# Patient Record
Sex: FEMALE | Race: BLACK OR AFRICAN AMERICAN | NOT HISPANIC OR LATINO | Employment: OTHER | URBAN - METROPOLITAN AREA
[De-identification: names, ages, dates, MRNs, and addresses within clinical notes are randomized per-mention and may not be internally consistent; named-entity substitution may affect disease eponyms.]

---

## 2017-01-13 ENCOUNTER — OFFICE VISIT (OUTPATIENT)
Dept: OPHTHALMOLOGY | Facility: CLINIC | Age: 62
End: 2017-01-13
Payer: COMMERCIAL

## 2017-01-13 DIAGNOSIS — H25.13 NUCLEAR SCLEROSIS, BILATERAL: ICD-10-CM

## 2017-01-13 DIAGNOSIS — H40.1231 LOW-TENSION GLAUCOMA OF BOTH EYES, MILD STAGE: Primary | ICD-10-CM

## 2017-01-13 DIAGNOSIS — H52.7 REFRACTIVE ERROR: ICD-10-CM

## 2017-01-13 PROCEDURE — 92250 FUNDUS PHOTOGRAPHY W/I&R: CPT | Mod: S$GLB,,, | Performed by: OPHTHALMOLOGY

## 2017-01-13 PROCEDURE — 92083 EXTENDED VISUAL FIELD XM: CPT | Mod: S$GLB,,, | Performed by: OPHTHALMOLOGY

## 2017-01-13 PROCEDURE — 92014 COMPRE OPH EXAM EST PT 1/>: CPT | Mod: S$GLB,,, | Performed by: OPHTHALMOLOGY

## 2017-01-13 PROCEDURE — 99999 PR PBB SHADOW E&M-EST. PATIENT-LVL II: CPT | Mod: PBBFAC,,, | Performed by: OPHTHALMOLOGY

## 2017-01-13 NOTE — MR AVS SNAPSHOT
[facility] - Ophthalmology  [address] [facility] [address] [place] [address]  Phone: [phone]  Fax: [phone]                  Sophia Hope   2017 8:30 AM   Office Visit    Description:  Female : 1955   Provider:  Jose Scott MD   Department:  [facility] - Ophthalmology           Reason for Visit     Glaucoma           Diagnoses this Visit        Comments    Low-tension glaucoma of both eyes, mild stage    -  Primary     Nuclear sclerosis, bilateral         Refractive error                To Do List           Future Appointments        Provider Department Dept Phone    2017 8:30 AM Tamela Lance MD [facility] [phone]      Goals (5 Years of Data)     None      [facility]     [facility] -  Assistance  Registered nurses in the [facility] provide clinical advisement, health education, appointment booking, and other advisory services.  Call for this free service at [phone].             Medications           Message regarding Medications     Verify the changes and/or additions to your medication regime listed below are the same as discussed with your clinician today.  If any of these changes or additions are incorrect, please notify your healthcare provider.             Verify that the below list of medications is an accurate representation of the medications you are currently taking.  If none reported, the list may be blank. If incorrect, please contact your healthcare provider. Carry this list with you in case of emergency.           Current Medications     allopurinol (ZYLOPRIM) 100 MG tablet Take 1 tablet (100 mg total) by mouth once daily.    atenolol (TENORMIN) 100 MG tablet take 1 tablet by mouth once daily    calcium carbonate-vitamin D2 600 mg calcium- 200 unit Cap Take 1 tablet by mouth Twice daily.    COMBIGAN 0.2-0.5 % Drop instill 1 drop into both eyes twice a day    dextran 70-hypromellose (ARTIFICIAL  TEARS) ophthalmic solution Place 1 drop into both eyes. BOTH EYES PRN     hydrochlorothiazide (MICROZIDE) 12.5 mg capsule Take 1 capsule (12.5 mg total) by mouth once daily.    irbesartan (AVAPRO) 300 MG tablet take 1 tablet by mouth once daily    latanoprost 0.005 % ophthalmic solution instill 1 drop into both eyes at bedtime    levothyroxine (SYNTHROID) 100 MCG tablet take 1 tablet by mouth every morning ON AN EMPTY STOMACH    meclizine (ANTIVERT) 25 mg tablet Take 1 tablet (25 mg total) by mouth every 6 (six) hours as needed for Dizziness.    meloxicam (MOBIC) 15 MG tablet Take 1 tablet (15 mg total) by mouth once daily.           Clinical Reference Information           Allergies as of 1/13/2017     Clonidine    Cardizem [Diltiazem Hcl]      Immunizations Administered on Date of Encounter - 1/13/2017     None      Orders Placed During Today's Visit      Normal Orders This Visit    Color Fundus Photography - OU - Both Eyes     Jimenes Visual Field - OU - Extended - Both Eyes

## 2017-01-13 NOTE — PROGRESS NOTES
SUBJECTIVE:   Sophia Hope is a 61 y.o. female   Corrected distance visual acuity was 20/30  in the right eye and 20/25 in the left eye.   Chief Complaint   Patient presents with    Glaucoma     combigan bid ou, xalatan qhs ou        HPI:  HPI     Glaucoma    Additional comments: combigan bid ou, xalatan qhs ou           Comments   3-4 mth hvf, dilation, and sdp's. States 98% compliance.     DR HENRY PT  1. LTG No FHx (init 17/17=21/21 adjusted for thin K's)  Goal <14  CCT /  2. Cataract OU    AT's prn OU    Combigan BID OU  Xalatan QHS OU       Last edited by Nora Loera MA on 1/13/2017  8:49 AM. (History)        Assessment /Plan :  1. Low-tension glaucoma of both eyes, mild stage Doing well, IOP within acceptable range relative to target IOP and no evidence of progression. Continue current treatment. Reviewed importance of continued compliance with treatment and follow up.     2. Nuclear sclerosis, bilateral OD>OS Patient does reports mild visual decline from incipient cataractous changes, but not sufficient to affect activities of daily living. I recommend monitoring visual status and follow up when visual symptoms worsen.     3. Refractive error      Return to clinic in 4 months  or as needed.  With IOP Check and GOCT

## 2017-02-21 RX ORDER — HYDROCHLOROTHIAZIDE 12.5 MG/1
12.5 CAPSULE ORAL DAILY
Qty: 30 CAPSULE | Refills: 0 | Status: SHIPPED | OUTPATIENT
Start: 2017-02-21 | End: 2017-03-18 | Stop reason: SDUPTHER

## 2017-03-06 ENCOUNTER — LAB VISIT (OUTPATIENT)
Dept: LAB | Facility: HOSPITAL | Age: 62
End: 2017-03-06
Attending: FAMILY MEDICINE
Payer: COMMERCIAL

## 2017-03-06 ENCOUNTER — OFFICE VISIT (OUTPATIENT)
Dept: FAMILY MEDICINE | Facility: CLINIC | Age: 62
End: 2017-03-06
Payer: COMMERCIAL

## 2017-03-06 VITALS
SYSTOLIC BLOOD PRESSURE: 134 MMHG | HEIGHT: 67 IN | HEART RATE: 85 BPM | OXYGEN SATURATION: 95 % | BODY MASS INDEX: 31.01 KG/M2 | TEMPERATURE: 97 F | WEIGHT: 197.56 LBS | RESPIRATION RATE: 18 BRPM | DIASTOLIC BLOOD PRESSURE: 70 MMHG

## 2017-03-06 DIAGNOSIS — E66.9 OBESITY (BMI 30.0-34.9): ICD-10-CM

## 2017-03-06 DIAGNOSIS — E79.0 HYPERURICEMIA: ICD-10-CM

## 2017-03-06 DIAGNOSIS — H40.1290: ICD-10-CM

## 2017-03-06 DIAGNOSIS — I10 ESSENTIAL HYPERTENSION: ICD-10-CM

## 2017-03-06 DIAGNOSIS — Z12.31 OTHER SCREENING MAMMOGRAM: ICD-10-CM

## 2017-03-06 DIAGNOSIS — Z23 NEED FOR VACCINE FOR TD (TETANUS-DIPHTHERIA): ICD-10-CM

## 2017-03-06 DIAGNOSIS — Z00.00 ANNUAL PHYSICAL EXAM: ICD-10-CM

## 2017-03-06 DIAGNOSIS — E03.4 HYPOTHYROIDISM DUE TO ACQUIRED ATROPHY OF THYROID: ICD-10-CM

## 2017-03-06 DIAGNOSIS — Z00.00 ANNUAL PHYSICAL EXAM: Primary | ICD-10-CM

## 2017-03-06 DIAGNOSIS — M85.80 OSTEOPENIA: ICD-10-CM

## 2017-03-06 DIAGNOSIS — Z78.0 POSTMENOPAUSAL: ICD-10-CM

## 2017-03-06 LAB
ALBUMIN SERPL BCP-MCNC: 3.6 G/DL
ALP SERPL-CCNC: 73 U/L
ALT SERPL W/O P-5'-P-CCNC: 15 U/L
ANION GAP SERPL CALC-SCNC: 9 MMOL/L
AST SERPL-CCNC: 22 U/L
BASOPHILS # BLD AUTO: 0.03 K/UL
BASOPHILS NFR BLD: 0.6 %
BILIRUB SERPL-MCNC: 0.9 MG/DL
BILIRUB UR QL STRIP: NEGATIVE
BUN SERPL-MCNC: 18 MG/DL
CALCIUM SERPL-MCNC: 9.6 MG/DL
CHLORIDE SERPL-SCNC: 106 MMOL/L
CHOLEST/HDLC SERPL: 5.5 {RATIO}
CLARITY UR REFRACT.AUTO: CLEAR
CO2 SERPL-SCNC: 28 MMOL/L
COLOR UR AUTO: YELLOW
CREAT SERPL-MCNC: 1.1 MG/DL
DIFFERENTIAL METHOD: ABNORMAL
EOSINOPHIL # BLD AUTO: 0.1 K/UL
EOSINOPHIL NFR BLD: 1.9 %
ERYTHROCYTE [DISTWIDTH] IN BLOOD BY AUTOMATED COUNT: 13.3 %
EST. GFR  (AFRICAN AMERICAN): >60 ML/MIN/1.73 M^2
EST. GFR  (NON AFRICAN AMERICAN): 53.6 ML/MIN/1.73 M^2
GLUCOSE SERPL-MCNC: 98 MG/DL
GLUCOSE UR QL STRIP: NEGATIVE
HCT VFR BLD AUTO: 40.8 %
HDL/CHOLESTEROL RATIO: 18.2 %
HDLC SERPL-MCNC: 198 MG/DL
HDLC SERPL-MCNC: 36 MG/DL
HGB BLD-MCNC: 13.1 G/DL
HGB UR QL STRIP: NEGATIVE
KETONES UR QL STRIP: NEGATIVE
LDLC SERPL CALC-MCNC: 136 MG/DL
LEUKOCYTE ESTERASE UR QL STRIP: NEGATIVE
LYMPHOCYTES # BLD AUTO: 2 K/UL
LYMPHOCYTES NFR BLD: 37.5 %
MCH RBC QN AUTO: 31.4 PG
MCHC RBC AUTO-ENTMCNC: 32.1 %
MCV RBC AUTO: 98 FL
MONOCYTES # BLD AUTO: 0.4 K/UL
MONOCYTES NFR BLD: 6.6 %
NEUTROPHILS # BLD AUTO: 2.8 K/UL
NEUTROPHILS NFR BLD: 53.4 %
NITRITE UR QL STRIP: NEGATIVE
NONHDLC SERPL-MCNC: 162 MG/DL
PH UR STRIP: 6 [PH] (ref 5–8)
PLATELET # BLD AUTO: 199 K/UL
PMV BLD AUTO: 11.7 FL
POTASSIUM SERPL-SCNC: 4.2 MMOL/L
PROT SERPL-MCNC: 7.5 G/DL
PROT UR QL STRIP: NEGATIVE
RBC # BLD AUTO: 4.17 M/UL
SODIUM SERPL-SCNC: 143 MMOL/L
SP GR UR STRIP: 1.01 (ref 1–1.03)
TRIGL SERPL-MCNC: 130 MG/DL
TSH SERPL DL<=0.005 MIU/L-ACNC: 0.41 UIU/ML
URATE SERPL-MCNC: 7.4 MG/DL
URN SPEC COLLECT METH UR: NORMAL
UROBILINOGEN UR STRIP-ACNC: NEGATIVE EU/DL
WBC # BLD AUTO: 5.31 K/UL

## 2017-03-06 PROCEDURE — 90471 IMMUNIZATION ADMIN: CPT | Mod: S$GLB,,, | Performed by: FAMILY MEDICINE

## 2017-03-06 PROCEDURE — 3078F DIAST BP <80 MM HG: CPT | Mod: S$GLB,,, | Performed by: FAMILY MEDICINE

## 2017-03-06 PROCEDURE — 81003 URINALYSIS AUTO W/O SCOPE: CPT

## 2017-03-06 PROCEDURE — 99396 PREV VISIT EST AGE 40-64: CPT | Mod: 25,S$GLB,, | Performed by: FAMILY MEDICINE

## 2017-03-06 PROCEDURE — 84443 ASSAY THYROID STIM HORMONE: CPT

## 2017-03-06 PROCEDURE — 86803 HEPATITIS C AB TEST: CPT

## 2017-03-06 PROCEDURE — 36415 COLL VENOUS BLD VENIPUNCTURE: CPT | Mod: PO

## 2017-03-06 PROCEDURE — 80061 LIPID PANEL: CPT

## 2017-03-06 PROCEDURE — 90714 TD VACC NO PRESV 7 YRS+ IM: CPT | Mod: S$GLB,,, | Performed by: FAMILY MEDICINE

## 2017-03-06 PROCEDURE — 84550 ASSAY OF BLOOD/URIC ACID: CPT

## 2017-03-06 PROCEDURE — 85025 COMPLETE CBC W/AUTO DIFF WBC: CPT

## 2017-03-06 PROCEDURE — 99999 PR PBB SHADOW E&M-EST. PATIENT-LVL III: CPT | Mod: PBBFAC,,, | Performed by: FAMILY MEDICINE

## 2017-03-06 PROCEDURE — 88175 CYTOPATH C/V AUTO FLUID REDO: CPT

## 2017-03-06 PROCEDURE — 80053 COMPREHEN METABOLIC PANEL: CPT

## 2017-03-06 PROCEDURE — 3075F SYST BP GE 130 - 139MM HG: CPT | Mod: S$GLB,,, | Performed by: FAMILY MEDICINE

## 2017-03-06 NOTE — PROGRESS NOTES
HISTORY OF PRESENT ILLNESS: Ms. Hope comes in today fasting and with taking medication without acute problems for annual wellness examination.    END OF LIFE DECISION: She has no living will and does not desire life support.     Current Outpatient Prescriptions   Medication Sig    atenolol (TENORMIN) 100 MG tablet take 1 tablet by mouth once daily    calcium carbonate-vitamin D2 600 mg calcium- 200 unit Cap Take 1 tablet by mouth Twice daily.    COMBIGAN 0.2-0.5 % Drop instill 1 drop into both eyes twice a day    dextran 70-hypromellose (ARTIFICIAL TEARS) ophthalmic solution Place 1 drop into both eyes. BOTH EYES PRN     hydrochlorothiazide (MICROZIDE) 12.5 mg capsule Take 1 capsule (12.5 mg total) by mouth once daily.    irbesartan (AVAPRO) 300 MG tablet take 1 tablet by mouth once daily    latanoprost 0.005 % ophthalmic solution instill 1 drop into both eyes at bedtime    levothyroxine (SYNTHROID) 100 MCG tablet take 1 tablet by mouth every morning ON AN EMPTY STOMACH    meloxicam (MOBIC) 15 MG tablet Take 1 tablet (15 mg total) by mouth once daily PRN.    allopurinol (ZYLOPRIM) 100 MG tablet Take 1 tablet (100 mg total) by mouth once daily (NOT TAKING)    meclizine (ANTIVERT) 25 mg tablet Take 1 tablet (25 mg total) by mouth every 6 (six) hours as needed for Dizziness.     SCREENINGS:   Cholesterol: February 18, 2016.  FFS/Colonoscopy: April 14, 2014 - benign colon polyp, diverticulosis; repeat in 5 years.  Mammogram: March 10, 2016 - okay.  GYN Exam: February 18, 2016 - okay. Per GYN pap smear every 3 years but patient desires pap smear today.  Dexa Scan: March 9, 2015 - osteopenia; repeat in 5 years.  Eye Exam: January 13, 2017 with Dr. CATRACHO Scott and every 4 months for glaucoma surveillance.  PPD: Never.  Immunizations: Td/Tdap - May 29, 2007. She desires.  Gardasil - N./A.  Zostavax - February 17, 2014.  Pneumovax - Never.  Seasonal Flu - October 27, 2016.    ROS:  GENERAL: Denies fever, chills,  "fatigue or unusual weight change. Appetite normal. Weight 86 kg (189 lb 9.5 oz) at August 18, 2016 visit. Monitors diet now since sister (who flooded moved out). Has not been exercising.  SKIN: Denies rashes, itching, changes in mole, color or texture of skin or easy bruising.  HEAD: Denies headaches or recent head trauma.  EYES: Denies change in vision, pain, diplopia, redness or discharge. She wears glasses.  EARS: Denies ear pain, discharge, vertigo or decreased hearing.  NOSE: Denies loss of smell, epistaxis or rhinitis.  MOUTH & THROAT: Denies hoarseness or change in voice. Denies excessive gum bleeding or mouth sores. Denies sore throat.  NODES: Denies swollen glands.  CHEST: Denies LUCERO, wheezing, cough, or sputum production.  CARDIOVASCULAR: Denies chest pain, PND, orthopnea or reduced exercise tolerance. Denies palpitations.  ABDOMEN: Denies diarrhea, constipation, nausea, vomiting, abdominal pain, or blood in stool.  URINARY: Denies flank pain, dysuria or hematuria.  GENITOURINARY: Denies flank pain, dysuria, frequency or hematuria. She occasionally performs monthly breast self exams.  ENDOCRINE: Denies diabetes or cholesterol problems.  HEME/LYMPH: Denies bleeding problems.  PERIPHERAL VASCULAR:Denies claudication or cyanosis.  MUSCULOSKELETAL: Denies joint stiffness, pain or swelling. Denies edema.  NEUROLOGIC: Denies history of seizures, tremors, paralysis, alteration of gait or coordination.  PSYCHIATRIC: Denies mood swings, depression, anxiety, homicidal or suicidal thoughts. Denies sleep problems.    PE:   VS: /70  Pulse 85  Temp 96.6 °F (35.9 °C) (Tympanic)   Resp 18  Ht 5' 6.5" (1.689 m)  Wt 89.6 kg (197 lb 8.5 oz)  SpO2 95%  BMI 31.4 kg/m2  APPEARANCE: Well nourished, well developed female, pleasant and obese, alert and oriented in no acute distress.   HEAD: Non tender. Full range of motion.  EYES: PERRL, conjunctiva pink, lids no edema.  EARS: External canal patent, no swelling or " redness. TM's shiny and clear.  NOSE: Mucosa and turbinates pink, not swollen. No discharge. Non tender sinuses.  THROAT: No pharyngeal erythema or exudate. No stridor.   NECK: Supple, no mass, thyroid not enlarged.  NODES: No cervical, axillary or inguinal lymph node enlargement.  CHEST: Normal respiratory effort. Lungs clear to auscultation.  CARDIOVASCULAR: Normal S1, S2. No rubs, murmurs or gallops. PMI not displaced. No carotid bruit. Pedal pulses palpable bilaterally. No edema.  ABDOMEN: Bowel sounds present. Not distended. Soft. No tenderness, masses or organomegaly.  BREAST EXAM: Symmetrical, no external lesions, no discharge, no masses palpated.  PELVIC EXAM: No external lesions noted, no discharge, cervix appears normal, bimanual exam showed no uterine abnormalities and no adnexal masses or tenderness. Urethra and bladder intact.  RECTAL EXAM: No external hemorrhoids or anal fissures. Heme-negative stool in the rectal vault.  MUSCULOSKELETAL: No joint deformities or stiffness. She is ambulatory without problems.  SKIN: No rashes or suspicious lesions, normal color and turgor.  NEUROLOGIC: Cranial Nerves: II-XII grossly intact. DTR's: Knees, Ankles 2+ and equal bilaterally. Gait & Posture: Normal gait and fine motion.  PSYCHIATRIC: Patient alert, oriented x 3. Mood/Affect normal without acute anxiety or depression noted. Judgment/insight good as she makes appropriate decisions during today's examination.    ASSESSMENT:    ICD-10-CM ICD-9-CM    1. Annual physical exam Z00.00 V70.0 Uric acid      CBC auto differential      TSH      Urinalysis      Comprehensive metabolic panel      Lipid panel      Hepatitis C antibody      Liquid-based pap smear, screening   2. Essential hypertension I10 401.9    3. Hypothyroidism due to acquired atrophy of thyroid E03.4 244.8      246.8    4. Osteopenia M85.80 733.90    5. Hyperuricemia E79.0 790.6    6. Low tension open-angle glaucoma(365.12)  365.12    7. Obesity (BMI  30.0-34.9) E66.9 278.00    8. Postmenopausal Z78.0 V49.81    9. Need for vaccine for TD (tetanus-diphtheria) Z23 V06.5 Td Vaccinie   10. Other screening mammogram Z12.31 V76.12 Mammo Digital Screening Bilat with CAD       PLAN:  1. Age-appropriate counseling-appropriate low-sodium, low-cholesterol diet and exercise daily, monthly breast self-exam, annual wellness examination.  2. Patient advised to correspond via My Chart for results.  3. Continue current medications.  4. Keep follow up with specialist.  5. See me in 6 months for hypertension follow up.

## 2017-03-06 NOTE — MR AVS SNAPSHOT
[facility]  [address] [facility] [address]  Phone: [phone]                  Sophia Hope   3/6/2017 9:00 AM   Office Visit    Description:  Female : 1955   Provider:  Tamela Lance MD   Department:  [facility]           Reason for Visit     Annual Exam           Diagnoses this Visit        Comments    Annual physical exam    -  Primary     Essential hypertension         Hypothyroidism due to acquired atrophy of thyroid         Osteopenia         Hyperuricemia         Low tension open-angle glaucoma(365.12)         Obesity (BMI 30.0-34.9)         Postmenopausal         Need for vaccine for TD (tetanus-diphtheria)         Other screening mammogram                To Do List           Future Appointments        Provider Department Dept Phone    3/6/2017 11:10 AM LABORATORY, [address] [facility] [phone]    3/15/2017 8:00 AM [facility] MAMMO1-S[facility] [phone]    2017 8:00 AM Jose Scott MD [facility] - Ophthalmology [phone]    2017 8:00 AM Tamela Lance MD [facility] [phone]      Goals (5 Years of Data)     None      Follow-Up and Disposition     Return in about 6 months (around 2017) for blood pressure follow up.      [facility]     [facility] Care [facility] - 24/ Assistance  Registered nurses in the [facility] provide clinical advisement, health education, appointment booking, and other advisory services.  Call for this free service at [phone].             Medications           Message regarding Medications     Verify the changes and/or additions to your medication regime listed below are the same as discussed with your clinician today.  If any of these changes or additions are incorrect, please notify your healthcare provider.             Verify that the below list of medications is an accurate  "representation of the medications you are currently taking.  If none reported, the list may be blank. If incorrect, please contact your healthcare provider. Carry this list with you in case of emergency.           Current Medications     atenolol (TENORMIN) 100 MG tablet take 1 tablet by mouth once daily    calcium carbonate-vitamin D2 600 mg calcium- 200 unit Cap Take 1 tablet by mouth Twice daily.    COMBIGAN 0.2-0.5 % Drop instill 1 drop into both eyes twice a day    dextran 70-hypromellose (ARTIFICIAL TEARS) ophthalmic solution Place 1 drop into both eyes. BOTH EYES PRN     hydrochlorothiazide (MICROZIDE) 12.5 mg capsule Take 1 capsule (12.5 mg total) by mouth once daily.    irbesartan (AVAPRO) 300 MG tablet take 1 tablet by mouth once daily    latanoprost 0.005 % ophthalmic solution instill 1 drop into both eyes at bedtime    levothyroxine (SYNTHROID) 100 MCG tablet take 1 tablet by mouth every morning ON AN EMPTY STOMACH    meloxicam (MOBIC) 15 MG tablet Take 1 tablet (15 mg total) by mouth once daily.    allopurinol (ZYLOPRIM) 100 MG tablet Take 1 tablet (100 mg total) by mouth once daily.    meclizine (ANTIVERT) 25 mg tablet Take 1 tablet (25 mg total) by mouth every 6 (six) hours as needed for Dizziness.           Clinical Reference Information           Your Vitals Were     BP Pulse Temp Resp Height Weight    134/70 85 96.6 °F (35.9 °C) (Tympanic) 18 5' 6.5" (1.689 m) 89.6 kg (197 lb 8.5 oz)    SpO2 BMI             95% 31.4 kg/m2         Blood Pressure          Most Recent Value    BP  134/70      Allergies as of 3/6/2017     Clonidine    Cardizem [Diltiazem Hcl]      Immunizations Administered on Date of Encounter - 3/6/2017     Name Date Dose VIS Date Route    TD 3/6/2017 0.5 mL 2/24/2015 Intramuscular      Orders Placed During Today's Visit      Normal Orders This Visit    Liquid-based pap smear, screening     Td Vaccinie     Urinalysis     Future Labs/Procedures Expected by Expires    CBC auto " differential  3/6/2017 5/5/2018    Comprehensive metabolic panel  3/6/2017 5/5/2018    Hepatitis C antibody  3/6/2017 5/5/2018    Lipid panel  3/6/2017 5/5/2018    Mammo Digital Screening Bilat with CAD  3/6/2017 5/6/2018    TSH  3/6/2017 5/5/2018    Uric acid  3/6/2017 5/5/2018      Instructions    Please correspond with Dr. Lance via My Chart for your results.     Thanks.       Language Assistance Services     ATTENTION: Language assistance services are available, free of charge. Please call [phone].      ATENCIÓN: Si habla español, tiene a toussaint disposición servicios gratuitos de asistencia lingüística. Llame al [phone].     CHÚ Ý: N?u b?n nói Ti?ng Vi?t, có các d?ch v? h? tr? ngôn ng? mi?n phí dành cho b?n. G?i s? [phone].         [facility] complies with applicable Federal civil rights laws and does not discriminate on the basis of race, color, national origin, age, disability, or sex.

## 2017-03-07 LAB — HCV AB SERPL QL IA: NEGATIVE

## 2017-03-10 ENCOUNTER — PATIENT MESSAGE (OUTPATIENT)
Dept: FAMILY MEDICINE | Facility: CLINIC | Age: 62
End: 2017-03-10

## 2017-03-15 ENCOUNTER — HOSPITAL ENCOUNTER (OUTPATIENT)
Dept: RADIOLOGY | Facility: HOSPITAL | Age: 62
Discharge: HOME | End: 2017-03-15
Attending: FAMILY MEDICINE
Payer: COMMERCIAL

## 2017-03-15 DIAGNOSIS — Z12.31 OTHER SCREENING MAMMOGRAM: ICD-10-CM

## 2017-03-15 PROCEDURE — 77067 SCR MAMMO BI INCL CAD: CPT | Mod: TC

## 2017-03-15 PROCEDURE — 77067 SCR MAMMO BI INCL CAD: CPT | Mod: 26,,, | Performed by: RADIOLOGY

## 2017-03-15 PROCEDURE — 77063 BREAST TOMOSYNTHESIS BI: CPT | Mod: 26,,, | Performed by: RADIOLOGY

## 2017-03-20 RX ORDER — HYDROCHLOROTHIAZIDE 12.5 MG/1
CAPSULE ORAL
Qty: 30 CAPSULE | Refills: 5 | Status: SHIPPED | OUTPATIENT
Start: 2017-03-20 | End: 2018-04-26 | Stop reason: SDUPTHER

## 2017-03-21 ENCOUNTER — PATIENT MESSAGE (OUTPATIENT)
Dept: FAMILY MEDICINE | Facility: CLINIC | Age: 62
End: 2017-03-21

## 2017-03-21 DIAGNOSIS — E79.0 HYPERURICEMIA: ICD-10-CM

## 2017-03-21 RX ORDER — ALLOPURINOL 100 MG/1
100 TABLET ORAL DAILY
Qty: 30 TABLET | Refills: 5 | Status: SHIPPED | OUTPATIENT
Start: 2017-03-21 | End: 2017-10-21 | Stop reason: SDUPTHER

## 2017-03-29 RX ORDER — HYDROCHLOROTHIAZIDE 12.5 MG/1
CAPSULE ORAL
Qty: 30 CAPSULE | Refills: 5 | Status: SHIPPED | OUTPATIENT
Start: 2017-03-29 | End: 2017-06-01 | Stop reason: SDUPTHER

## 2017-03-29 RX ORDER — BRIMONIDINE TARTRATE, TIMOLOL MALEATE 2; 5 MG/ML; MG/ML
SOLUTION/ DROPS OPHTHALMIC
Qty: 5 ML | Refills: 5 | Status: SHIPPED | OUTPATIENT
Start: 2017-03-29 | End: 2017-08-25 | Stop reason: SDUPTHER

## 2017-05-12 ENCOUNTER — OFFICE VISIT (OUTPATIENT)
Dept: OPHTHALMOLOGY | Facility: CLINIC | Age: 62
End: 2017-05-12
Payer: COMMERCIAL

## 2017-05-12 DIAGNOSIS — H25.13 NUCLEAR SCLEROSIS, BILATERAL: ICD-10-CM

## 2017-05-12 DIAGNOSIS — H40.1231 LOW-TENSION GLAUCOMA OF BOTH EYES, MILD STAGE: Primary | ICD-10-CM

## 2017-05-12 DIAGNOSIS — H26.9 CORTICAL CATARACT: ICD-10-CM

## 2017-05-12 PROCEDURE — 99999 PR PBB SHADOW E&M-EST. PATIENT-LVL I: CPT | Mod: PBBFAC,,, | Performed by: OPHTHALMOLOGY

## 2017-05-12 PROCEDURE — 92012 INTRM OPH EXAM EST PATIENT: CPT | Mod: S$GLB,,, | Performed by: OPHTHALMOLOGY

## 2017-05-12 PROCEDURE — 92133 CPTRZD OPH DX IMG PST SGM ON: CPT | Mod: S$GLB,,, | Performed by: OPHTHALMOLOGY

## 2017-05-12 NOTE — PROGRESS NOTES
SUBJECTIVE:   Sophia Hope is a 61 y.o. female   Corrected distance visual acuity was 20/30 -2 in the right eye and 20/25 in the left eye.   Chief Complaint   Patient presents with    Glaucoma     combigan bid ou, xalatan qhs ou. 4 mth iop, goct. 98% compliance        HPI:  HPI     Glaucoma    Additional comments: combigan bid ou, xalatan qhs ou. 4 mth iop, goct.   98% compliance           Comments   1. Mild LTG No FHx (init 17/17=21/21 adjusted for thin K's)  Goal <14  CCT /  2. Cataract OU    AT's prn OU    Combigan BID OU  Xalatan QHS OU       Last edited by Nora Loera MA on 5/12/2017  8:15 AM. (History)        Assessment /Plan :  1. Low-tension glaucoma of both eyes, mild stage Doing well, IOP within acceptable range relative to target IOP and no evidence of progression. Continue current treatment. Reviewed importance of continued compliance with treatment and follow up.     2. Nuclear sclerosis, bilateral will cont to follow   3. Cortical cataract as above     Return to clinic in 4 months  or as needed.  With IOP Check

## 2017-05-22 RX ORDER — LEVOTHYROXINE SODIUM 100 UG/1
100 TABLET ORAL
Qty: 30 TABLET | Refills: 5 | Status: SHIPPED | OUTPATIENT
Start: 2017-05-22 | End: 2017-12-01 | Stop reason: SDUPTHER

## 2017-06-01 ENCOUNTER — LAB VISIT (OUTPATIENT)
Dept: LAB | Facility: HOSPITAL | Age: 62
End: 2017-06-01
Attending: FAMILY MEDICINE
Payer: COMMERCIAL

## 2017-06-01 ENCOUNTER — OFFICE VISIT (OUTPATIENT)
Dept: FAMILY MEDICINE | Facility: CLINIC | Age: 62
End: 2017-06-01
Payer: COMMERCIAL

## 2017-06-01 VITALS
HEIGHT: 67 IN | HEART RATE: 67 BPM | RESPIRATION RATE: 16 BRPM | WEIGHT: 202.38 LBS | TEMPERATURE: 97 F | SYSTOLIC BLOOD PRESSURE: 132 MMHG | BODY MASS INDEX: 31.76 KG/M2 | DIASTOLIC BLOOD PRESSURE: 86 MMHG | OXYGEN SATURATION: 98 %

## 2017-06-01 DIAGNOSIS — E79.0 HYPERURICEMIA: ICD-10-CM

## 2017-06-01 DIAGNOSIS — I10 ESSENTIAL HYPERTENSION: Primary | ICD-10-CM

## 2017-06-01 DIAGNOSIS — M19.072 ARTHRITIS OF LEFT FOOT: ICD-10-CM

## 2017-06-01 LAB
ANION GAP SERPL CALC-SCNC: 9 MMOL/L
BASOPHILS # BLD AUTO: 0.02 K/UL
BASOPHILS NFR BLD: 0.4 %
BUN SERPL-MCNC: 19 MG/DL
CALCIUM SERPL-MCNC: 9.1 MG/DL
CHLORIDE SERPL-SCNC: 106 MMOL/L
CO2 SERPL-SCNC: 26 MMOL/L
CREAT SERPL-MCNC: 1 MG/DL
DIFFERENTIAL METHOD: ABNORMAL
EOSINOPHIL # BLD AUTO: 0.1 K/UL
EOSINOPHIL NFR BLD: 2.3 %
ERYTHROCYTE [DISTWIDTH] IN BLOOD BY AUTOMATED COUNT: 13.5 %
EST. GFR  (AFRICAN AMERICAN): >60 ML/MIN/1.73 M^2
EST. GFR  (NON AFRICAN AMERICAN): >60 ML/MIN/1.73 M^2
GLUCOSE SERPL-MCNC: 96 MG/DL
HCT VFR BLD AUTO: 38.7 %
HGB BLD-MCNC: 12.5 G/DL
LYMPHOCYTES # BLD AUTO: 1.6 K/UL
LYMPHOCYTES NFR BLD: 30.9 %
MCH RBC QN AUTO: 31.6 PG
MCHC RBC AUTO-ENTMCNC: 32.3 %
MCV RBC AUTO: 98 FL
MONOCYTES # BLD AUTO: 0.4 K/UL
MONOCYTES NFR BLD: 6.8 %
NEUTROPHILS # BLD AUTO: 3.1 K/UL
NEUTROPHILS NFR BLD: 59.4 %
PLATELET # BLD AUTO: 199 K/UL
PMV BLD AUTO: 11 FL
POTASSIUM SERPL-SCNC: 3.9 MMOL/L
RBC # BLD AUTO: 3.96 M/UL
SODIUM SERPL-SCNC: 141 MMOL/L
URATE SERPL-MCNC: 5.7 MG/DL
WBC # BLD AUTO: 5.17 K/UL

## 2017-06-01 PROCEDURE — 99214 OFFICE O/P EST MOD 30 MIN: CPT | Mod: S$GLB,,, | Performed by: FAMILY MEDICINE

## 2017-06-01 PROCEDURE — 85025 COMPLETE CBC W/AUTO DIFF WBC: CPT

## 2017-06-01 PROCEDURE — 99999 PR PBB SHADOW E&M-EST. PATIENT-LVL III: CPT | Mod: PBBFAC,,, | Performed by: FAMILY MEDICINE

## 2017-06-01 PROCEDURE — 80048 BASIC METABOLIC PNL TOTAL CA: CPT

## 2017-06-01 PROCEDURE — 84550 ASSAY OF BLOOD/URIC ACID: CPT

## 2017-06-01 PROCEDURE — 36415 COLL VENOUS BLD VENIPUNCTURE: CPT | Mod: PO

## 2017-06-01 NOTE — PROGRESS NOTES
Subjective:       Patient ID: Sophia Hope is a 61 y.o. female.    Chief Complaint: Hypertension (Uric acid) and Follow-up      Ms. Hope comes in today for 2-3 month hypertension and elevated uric acid follow up.  She has taken medication today.  She states she has not been exercising but monitors her diet.    She states she is back on allopurinol and hydrochlorothiazide since March 21, 2017.  She states she generally has no foot pain of that she wears shoes that cover the top of her foot.  She denies having swelling, warmth, redness of her feet; shortness of breath, cough, wheezing; chest pain, palpitations, leg swelling; abdominal pain, nausea, vomiting, diarrhea, constipation; fever, chills, fatigue, appetite change; unusual urinary symptoms; headaches; back pain.    She saw Dr. Scott, ophthalmologist, on May 12, 2017 for glaucoma surveillance with 4-month follow-up advised.      Current Outpatient Prescriptions:  allopurinol (ZYLOPRIM) 100 MG tablet, Take 1 tablet (100 mg total) by mouth once daily.  atenolol (TENORMIN) 100 MG tablet, take 1 tablet by mouth once daily  calcium carbonate-vitamin D2 600 mg calcium- 200 unit Cap, Take 1 tablet by mouth Twice daily.  COMBIGAN 0.2-0.5 % Drop, instill 1 drop into both eyes twice a day  dextran 70-hypromellose (ARTIFICIAL TEARS) ophthalmic solution, Place 1 drop into both eyes. BOTH EYES PRN   hydrochlorothiazide (MICROZIDE) 12.5 mg capsule, take 1 capsule by mouth once daily  irbesartan (AVAPRO) 300 MG tablet, take 1 tablet by mouth once daily  latanoprost 0.005 % ophthalmic solution, instill 1 drop into both eyes at bedtime  levothyroxine (SYNTHROID) 100 MCG tablet, Take 1 tablet (100 mcg total) by mouth before breakfast.  meloxicam (MOBIC) 15 MG tablet, Take 1 tablet (15 mg total) by mouth once daily.  meclizine (ANTIVERT) 25 mg tablet, Take 1 tablet (25 mg total) by mouth every 6 (six) hours as needed for Dizziness.      Labs:                  WBC                       5.31                03/06/2017                 HGB                      13.1                03/06/2017                 HCT                      40.8                03/06/2017                 PLT                      199                 03/06/2017           LDLCALC                  136.0               03/06/2017                           CHOL                     198                 03/06/2017                 TRIG                     130                 03/06/2017                 HDL                      36 (L)              03/06/2017                 ALT                      15                  03/06/2017                 AST                      22                  03/06/2017                 NA                       143                 03/06/2017                 K                        4.2                 03/06/2017                 CL                       106                 03/06/2017                 CREATININE               1.1                 03/06/2017                 BUN                      18                  03/06/2017                 CO2                      28                  03/06/2017                 TSH                      0.407               03/06/2017                 URICACID                 7.4 (H)             03/06/2017               10/27/2016 Left foot x-ray - Findings: There is mild hallux valgus and associated bunion.  Degenerative changes in the midfoot region 1st MTP joint.  Dorsal and plantar calcaneal spurs.  Soft tissue swelling dorsal aspect of the forefoot.  No acute fracture or dislocation.      Hypertension   Pertinent negatives include no chest pain, headaches, palpitations or shortness of breath.     Review of Systems   Constitutional: Negative for activity change, appetite change, chills, fatigue and fever.        Weight 89.6 kg (197 lb 8.5 oz) at March 6, 2017 visit.   Eyes:        See history of present illness examination.   Respiratory: Negative for cough, shortness  of breath and wheezing.    Cardiovascular: Negative for chest pain, palpitations and leg swelling.   Gastrointestinal: Negative for abdominal pain, constipation, diarrhea, nausea and vomiting.   Genitourinary: Negative for difficulty urinating.   Musculoskeletal: Negative for back pain.        See history of present illness.   Neurological: Negative for headaches.       Objective:      Physical Exam   Constitutional: She is oriented to person, place, and time. She appears well-developed and well-nourished. No distress.   Cardiovascular: Normal rate, regular rhythm, normal heart sounds and intact distal pulses.    No murmur heard.  Pulmonary/Chest: Effort normal and breath sounds normal. No respiratory distress. She has no wheezes.   Abdominal: Soft. Bowel sounds are normal. She exhibits no distension and no mass. There is no tenderness. There is no rebound and no guarding.   Musculoskeletal: Normal range of motion. She exhibits no edema or tenderness.   She is ambulatory without problems. Non tender feet with full range of motion noted.   Neurological: She is alert and oriented to person, place, and time.   Skin: She is not diaphoretic.   Psychiatric: She has a normal mood and affect. Her behavior is normal. Judgment and thought content normal.   Vitals reviewed.      Assessment:       1. Essential hypertension    2. Hyperuricemia    3. Arthritis of left foot        Plan:       1.  Labs:  BMP, Uric Acid, CBC.  Patient advised to correspond via My Chart for results.  2.  Continue current medications, follow low sodium, low cholesterol, low carb diet, daily walks.  3.  Keep 9/6/2017 scheduled appointment with me for hypertension follow up.

## 2017-06-08 RX ORDER — IRBESARTAN 300 MG/1
300 TABLET ORAL DAILY
Qty: 30 TABLET | Refills: 0 | Status: SHIPPED | OUTPATIENT
Start: 2017-06-08 | End: 2017-07-13 | Stop reason: SDUPTHER

## 2017-07-14 RX ORDER — IRBESARTAN 300 MG/1
300 TABLET ORAL DAILY
Qty: 30 TABLET | Refills: 5 | Status: SHIPPED | OUTPATIENT
Start: 2017-07-14 | End: 2018-01-24 | Stop reason: SDUPTHER

## 2017-08-02 RX ORDER — LATANOPROST 50 UG/ML
SOLUTION/ DROPS OPHTHALMIC
Qty: 2.5 ML | Refills: 12 | Status: SHIPPED | OUTPATIENT
Start: 2017-08-02 | End: 2018-08-14 | Stop reason: SDUPTHER

## 2017-08-25 RX ORDER — BRIMONIDINE TARTRATE, TIMOLOL MALEATE 2; 5 MG/ML; MG/ML
SOLUTION/ DROPS OPHTHALMIC
Qty: 5 ML | Refills: 6 | Status: SHIPPED | OUTPATIENT
Start: 2017-08-25 | End: 2018-08-17 | Stop reason: SDUPTHER

## 2017-08-31 RX ORDER — BRIMONIDINE TARTRATE, TIMOLOL MALEATE 2; 5 MG/ML; MG/ML
SOLUTION/ DROPS OPHTHALMIC
Qty: 5 ML | Refills: 5 | Status: SHIPPED | OUTPATIENT
Start: 2017-08-31 | End: 2017-09-06 | Stop reason: SDUPTHER

## 2017-09-06 ENCOUNTER — OFFICE VISIT (OUTPATIENT)
Dept: FAMILY MEDICINE | Facility: CLINIC | Age: 62
End: 2017-09-06
Payer: COMMERCIAL

## 2017-09-06 VITALS
TEMPERATURE: 97 F | WEIGHT: 195.13 LBS | SYSTOLIC BLOOD PRESSURE: 112 MMHG | RESPIRATION RATE: 18 BRPM | OXYGEN SATURATION: 99 % | HEIGHT: 67 IN | BODY MASS INDEX: 30.63 KG/M2 | HEART RATE: 76 BPM | DIASTOLIC BLOOD PRESSURE: 70 MMHG

## 2017-09-06 DIAGNOSIS — M85.80 OSTEOPENIA, UNSPECIFIED LOCATION: ICD-10-CM

## 2017-09-06 DIAGNOSIS — H40.1231 LOW-TENSION GLAUCOMA OF BOTH EYES, MILD STAGE: ICD-10-CM

## 2017-09-06 DIAGNOSIS — E79.0 HYPERURICEMIA: ICD-10-CM

## 2017-09-06 DIAGNOSIS — E66.9 OBESITY (BMI 30.0-34.9): ICD-10-CM

## 2017-09-06 DIAGNOSIS — E03.4 HYPOTHYROIDISM DUE TO ACQUIRED ATROPHY OF THYROID: ICD-10-CM

## 2017-09-06 DIAGNOSIS — I10 ESSENTIAL HYPERTENSION: Primary | ICD-10-CM

## 2017-09-06 PROCEDURE — 3074F SYST BP LT 130 MM HG: CPT | Mod: S$GLB,,, | Performed by: FAMILY MEDICINE

## 2017-09-06 PROCEDURE — 3008F BODY MASS INDEX DOCD: CPT | Mod: S$GLB,,, | Performed by: FAMILY MEDICINE

## 2017-09-06 PROCEDURE — 99214 OFFICE O/P EST MOD 30 MIN: CPT | Mod: S$GLB,,, | Performed by: FAMILY MEDICINE

## 2017-09-06 PROCEDURE — 3078F DIAST BP <80 MM HG: CPT | Mod: S$GLB,,, | Performed by: FAMILY MEDICINE

## 2017-09-06 PROCEDURE — 99999 PR PBB SHADOW E&M-EST. PATIENT-LVL III: CPT | Mod: PBBFAC,,, | Performed by: FAMILY MEDICINE

## 2017-09-06 NOTE — PROGRESS NOTES
Subjective:       Patient ID: Sophia Hope is a 62 y.o. female.    Chief Complaint: Hypertension and Follow-up      Ms. Hope comes in today for 6-month hypertension follow-up.  She is not fasting but has taken medication today.  She states she has been monitoring her diet and exercises a little bit more by walking in the mall and moving around her house.    She reports no acute problems today.  She denies fever, chills, fatigue, appetite change; hot or cold intolerance; abdominal pain, nausea, vomiting, diarrhea, constipation; chest pain, palpitations, leg swelling; shortness of breath, cough, wheezing; unusual urinary symptoms; back pain; headaches; anxiety or depression.    She follows with Dr. Scott, ophthalmologist, for surveillance and treatment of glaucoma with last visit on May 12, 2017 and 4-month follow-up advised and scheduled for September 25, 2017.    She states she will be traveling on a 9-day trip by bus to [facility].  She leaves this Friday and states she needs a medical form completed for the trip.    Current Outpatient Prescriptions:  allopurinol (ZYLOPRIM) 100 MG tablet, Take 1 tablet (100 mg total) by mouth once daily.  atenolol (TENORMIN) 100 MG tablet, take 1 tablet by mouth once daily  calcium carbonate-vitamin D2 600 mg calcium- 200 unit Cap, Take 1 tablet by mouth Twice daily.  COMBIGAN 0.2-0.5 % Drop, instill 1 drop into both eyes twice a day  dextran 70-hypromellose (ARTIFICIAL TEARS) ophthalmic solution, Place 1 drop into both eyes. BOTH EYES PRN   hydrochlorothiazide (MICROZIDE) 12.5 mg capsule, take 1 capsule by mouth once daily  irbesartan (AVAPRO) 300 MG tablet, Take 1 tablet (300 mg total) by mouth once daily.  latanoprost 0.005 % ophthalmic solution, place 1 drop into both eyes at bedtime  levothyroxine (SYNTHROID) 100 MCG tablet, Take 1 tablet (100 mcg total) by mouth before breakfast.  meloxicam (MOBIC) 15 MG tablet, Take 1 tablet (15 mg total) by mouth once  daily.    Labs:                     WBC                      5.17                06/01/2017                 HGB                      12.5                06/01/2017                 HCT                      38.7                06/01/2017                 PLT                      199                 06/01/2017               LDLCALC                  136.0               03/06/2017                           CHOL                     198                 03/06/2017                 TRIG                     130                 03/06/2017                 HDL                      36 (L)              03/06/2017                 ALT                      15                  03/06/2017                 AST                      22                  03/06/2017                 NA                       141                 06/01/2017                 K                        3.9                 06/01/2017                 CL                       106                 06/01/2017                 CREATININE               1.0                 06/01/2017                 BUN                      19                  06/01/2017                 CO2                      26                  06/01/2017                 TSH                      0.407               03/06/2017                 HGBA1C                   5.2                 11/23/2015               URICACID                 5.7                 06/01/2017                Hypertension   Pertinent negatives include no chest pain, headaches, palpitations or shortness of breath.     Review of Systems   Constitutional: Positive for activity change. Negative for appetite change, chills, fatigue and fever.        Weight 91.8 kg (202 lb 6.1 oz) at June 1, 2017 visit.   Eyes:        See history of present illness examination.   Respiratory: Negative for cough, shortness of breath and wheezing.    Cardiovascular: Negative for chest pain, palpitations and leg swelling.   Gastrointestinal: Negative for abdominal  pain, constipation, diarrhea, nausea and vomiting.   Endocrine: Negative for cold intolerance and heat intolerance.        See history of present illness examination.   Genitourinary: Negative for difficulty urinating.   Musculoskeletal: Negative for back pain.   Neurological: Negative for headaches.   Psychiatric/Behavioral: Negative for dysphoric mood. The patient is not nervous/anxious.        Objective:      Physical Exam   Constitutional: She is oriented to person, place, and time. She appears well-developed and well-nourished. No distress.   Neck: Normal range of motion. Neck supple. No thyromegaly present.   Cardiovascular: Normal rate, regular rhythm, normal heart sounds and intact distal pulses.    No murmur heard.  Pulmonary/Chest: Effort normal and breath sounds normal. No respiratory distress. She has no wheezes.   Abdominal: Soft. Bowel sounds are normal. She exhibits no distension and no mass. There is no tenderness. There is no rebound and no guarding.   Musculoskeletal: Normal range of motion. She exhibits no edema.   She is ambulatory without problems.   Lymphadenopathy:     She has no cervical adenopathy.   Neurological: She is alert and oriented to person, place, and time.   Skin: She is not diaphoretic.   Psychiatric: She has a normal mood and affect. Her behavior is normal. Judgment and thought content normal.   Vitals reviewed.      Assessment:       1. Essential hypertension    2. Hypothyroidism due to acquired atrophy of thyroid    3. Hyperuricemia    4. Osteopenia, unspecified location    5. Low-tension glaucoma of both eyes, mild stage    6. Obesity (BMI 30.0-34.9)        Plan:       1.  No labs.  2.  Continue current medications, follow low sodium, low cholesterol, low carb diet, daily walks.  3.  Keep follow up with specialist.  4.  Flu shot this fall.  5.  See me in March 2018 for fasting annual wellness examination.  6.  Completed travel medical form per patient request.

## 2017-09-20 ENCOUNTER — HOSPITAL ENCOUNTER (OUTPATIENT)
Dept: RADIOLOGY | Facility: HOSPITAL | Age: 62
Discharge: HOME | End: 2017-09-20
Attending: FAMILY MEDICINE
Payer: COMMERCIAL

## 2017-09-20 ENCOUNTER — OFFICE VISIT (OUTPATIENT)
Dept: FAMILY MEDICINE | Facility: CLINIC | Age: 62
End: 2017-09-20
Payer: COMMERCIAL

## 2017-09-20 VITALS
WEIGHT: 193.56 LBS | BODY MASS INDEX: 30.38 KG/M2 | HEIGHT: 67 IN | DIASTOLIC BLOOD PRESSURE: 81 MMHG | OXYGEN SATURATION: 99 % | RESPIRATION RATE: 16 BRPM | SYSTOLIC BLOOD PRESSURE: 122 MMHG | TEMPERATURE: 99 F | HEART RATE: 115 BPM

## 2017-09-20 DIAGNOSIS — R05.9 COUGH: ICD-10-CM

## 2017-09-20 DIAGNOSIS — I10 ESSENTIAL HYPERTENSION: ICD-10-CM

## 2017-09-20 DIAGNOSIS — J02.9 SORE THROAT: Primary | ICD-10-CM

## 2017-09-20 DIAGNOSIS — M79.10 MYALGIA: ICD-10-CM

## 2017-09-20 DIAGNOSIS — R51.9 ACUTE NONINTRACTABLE HEADACHE, UNSPECIFIED HEADACHE TYPE: ICD-10-CM

## 2017-09-20 LAB
CTP QC/QA: YES
CTP QC/QA: YES
FLUAV AG NPH QL: NEGATIVE
FLUBV AG NPH QL: NEGATIVE
S PYO RRNA THROAT QL PROBE: NEGATIVE

## 2017-09-20 PROCEDURE — 3079F DIAST BP 80-89 MM HG: CPT | Mod: S$GLB,,, | Performed by: FAMILY MEDICINE

## 2017-09-20 PROCEDURE — 99999 PR PBB SHADOW E&M-EST. PATIENT-LVL III: CPT | Mod: PBBFAC,,, | Performed by: FAMILY MEDICINE

## 2017-09-20 PROCEDURE — 3074F SYST BP LT 130 MM HG: CPT | Mod: S$GLB,,, | Performed by: FAMILY MEDICINE

## 2017-09-20 PROCEDURE — 71020 XR CHEST PA AND LATERAL: CPT | Mod: 26,,, | Performed by: RADIOLOGY

## 2017-09-20 PROCEDURE — 87880 STREP A ASSAY W/OPTIC: CPT | Mod: QW,S$GLB,, | Performed by: FAMILY MEDICINE

## 2017-09-20 PROCEDURE — 71020 XR CHEST PA AND LATERAL: CPT | Mod: TC,PO

## 2017-09-20 PROCEDURE — 99214 OFFICE O/P EST MOD 30 MIN: CPT | Mod: S$GLB,,, | Performed by: FAMILY MEDICINE

## 2017-09-20 PROCEDURE — 87804 INFLUENZA ASSAY W/OPTIC: CPT | Mod: QW,S$GLB,, | Performed by: FAMILY MEDICINE

## 2017-09-20 PROCEDURE — 3008F BODY MASS INDEX DOCD: CPT | Mod: S$GLB,,, | Performed by: FAMILY MEDICINE

## 2017-09-20 RX ORDER — BENZONATATE 100 MG/1
100 CAPSULE ORAL 3 TIMES DAILY PRN
Qty: 30 CAPSULE | Refills: 0 | Status: SHIPPED | OUTPATIENT
Start: 2017-09-20 | End: 2017-09-30

## 2017-09-20 NOTE — PROGRESS NOTES
Subjective:      Patient ID: Sophia Hope is a 62 y.o. female.    Chief Complaint: Headache      Past Medical History:   Diagnosis Date    Arthritis     Benign colon polyp     Cataract     Diverticulosis     Glaucoma (increased eye pressure)     History of abnormal Pap smear     History of herpes zoster     Right flank 4/30/12    Hypertension     Hypothyroidism     Osteopenia     Postmenopausal      Past Surgical History:   Procedure Laterality Date    BTL      CERVIX LESION DESTRUCTION      Abnormal PAP    hysteroscopic ablation      MN DILATION/CURETTAGE,DIAGNOSTIC      D & C    TONSILLECTOMY       Family History   Problem Relation Age of Onset    Cancer Mother      stomach cancer    Cataracts Mother     Hypertension Sister     Cancer Sister      liver cancer    Hypertension Sister     Heart disease Brother      MI/CAD    No Known Problems Daughter     No Known Problems Son     Diabetes Neg Hx     Stroke Neg Hx     Blindness Neg Hx     Glaucoma Neg Hx     Macular degeneration Neg Hx     Retinal detachment Neg Hx     Strabismus Neg Hx     Thyroid disease Neg Hx      Social History     Social History    Marital status: Single     Spouse name: N/A    Number of children: 2    Years of education: N/A     Occupational History    Retired      Social History Main Topics    Smoking status: Former Smoker     Years: 5.00     Types: Cigarettes     Quit date: 6/7/1983    Smokeless tobacco: Never Used    Alcohol use No    Drug use: No    Sexual activity: No     Other Topics Concern    Not on file     Social History Narrative    She wears seatbelt.       Current Outpatient Prescriptions:     allopurinol (ZYLOPRIM) 100 MG tablet, Take 1 tablet (100 mg total) by mouth once daily., Disp: 30 tablet, Rfl: 5    atenolol (TENORMIN) 100 MG tablet, take 1 tablet by mouth once daily, Disp: 30 tablet, Rfl: 5    calcium carbonate-vitamin D2 600 mg calcium- 200 unit Cap, Take 1 tablet by mouth  "Twice daily., Disp: , Rfl:     COMBIGAN 0.2-0.5 % Drop, instill 1 drop into both eyes twice a day, Disp: 5 mL, Rfl: 6    dextran 70-hypromellose (ARTIFICIAL TEARS) ophthalmic solution, Place 1 drop into both eyes. BOTH EYES PRN , Disp: , Rfl:     hydrochlorothiazide (MICROZIDE) 12.5 mg capsule, take 1 capsule by mouth once daily, Disp: 30 capsule, Rfl: 5    irbesartan (AVAPRO) 300 MG tablet, Take 1 tablet (300 mg total) by mouth once daily., Disp: 30 tablet, Rfl: 5    latanoprost 0.005 % ophthalmic solution, place 1 drop into both eyes at bedtime, Disp: 2.5 mL, Rfl: 12    levothyroxine (SYNTHROID) 100 MCG tablet, Take 1 tablet (100 mcg total) by mouth before breakfast., Disp: 30 tablet, Rfl: 5    meloxicam (MOBIC) 15 MG tablet, Take 1 tablet (15 mg total) by mouth once daily., Disp: 30 tablet, Rfl: 6    benzonatate (TESSALON) 100 MG capsule, Take 1 capsule (100 mg total) by mouth 3 (three) times daily as needed for Cough., Disp: 30 capsule, Rfl: 0  Review of patient's allergies indicates:   Allergen Reactions    Clonidine      Other reaction(s): Rash    Cardizem [diltiazem hcl] Rash     Years ago per patient       Review of Systems   Neurological: Positive for headaches.     Headache        2 days of malaise fatigue, sore throat and now headache. Yesterday, could not get out of the bed.  Today, feeling better, but wants to r/o strep and flu.  She would like to go back to work.  Headache today, but tylenol generally works and has not taken yet.  MOst symptoms subsided. Cough persistent and achy in chest.  BP's well controlled.Dictation #1  MRN:[id number]  CSN:[id number]  Dictation #2  MRN:[id number]  CSN:[id number]      Objective:   /81 (BP Location: Right arm, Patient Position: Sitting, BP Method: Small (Manual))   Pulse (!) 115   Temp 98.7 °F (37.1 °C) (Tympanic)   Resp 16   Ht 5' 6.5" (1.689 m)   Wt 87.8 kg (193 lb 9 oz)   SpO2 99%   BMI 30.77 kg/m²      Physical Exam   Constitutional: She is oriented " to person, place, and time. She is cooperative. No distress.   HENT:   Right Ear: Hearing, tympanic membrane, external ear and ear canal normal.   Left Ear: Hearing, tympanic membrane, external ear and ear canal normal.   Mouth/Throat: Uvula is midline and mucous membranes are normal. Posterior oropharyngeal erythema present.   Eyes: Conjunctivae and EOM are normal.   Cardiovascular: Normal rate, regular rhythm and normal heart sounds.    Pulmonary/Chest: Effort normal and breath sounds normal.   Musculoskeletal: She exhibits no edema.   Neurological: She is alert and oriented to person, place, and time. Coordination and gait normal.   Skin: Skin is warm, dry and intact. No rash noted. She is not diaphoretic. No erythema.   Psychiatric: She has a normal mood and affect. Her speech is normal and behavior is normal.   Nursing note and vitals reviewed.          Assessment:       1. Sore throat    2. Myalgia    3. Cough    4. Acute nonintractable headache, unspecified headache type    5. Essential hypertension            Plan:         Sore throat  Comments:  R/o sterp and flu.  Symptoms c/w viral syndrome.  Tylenol prn and tessalon perles.  F/u if not resolved in a week or worsening.  Orders:  -     POCT Rapid Strep A  -     POCT Influenza A/B    Myalgia  -     POCT Influenza A/B    Cough  -     X-Ray Chest PA And Lateral; Future; Expected date: 09/20/2017    Acute nonintractable headache, unspecified headache type  Comments:  Tyelnol prn. Discussed contagious for 48-72 hours.    Essential hypertension  Comments:  Well controlled.    Other orders  -     benzonatate (TESSALON) 100 MG capsule; Take 1 capsule (100 mg total) by mouth 3 (three) times daily as needed for Cough.  Dispense: 30 capsule; Refill: 0        Patient Care Team:  Tamela Lance MD as PCP - General (Family Medicine)    Return if symptoms worsen or fail to improve.

## 2017-09-20 NOTE — PATIENT INSTRUCTIONS
"  Viral Syndrome (Adult)  A viral illness may cause a number of symptoms. The symptoms depend on the part of the body that the virus affects. If it settles in your nose, throat, and lungs, it may cause cough, sore throat, congestion, and sometimes headache. If it settles in your stomach and intestinal tract, it may cause vomiting and diarrhea. Sometimes it causes vague symptoms like "aching all over," feeling tired, loss of appetite, or fever.  A viral illness usually lasts 1 to 2 weeks, but sometimes it lasts longer. In some cases, a more serious infection can look like a viral syndrome in the first few days of the illness. You may need another exam and additional tests to know the difference. Watch for the warning signs listed below.  Home care  Follow these guidelines for taking care of yourself at home:  · If symptoms are severe, rest at home for the first 2 to 3 days.  · Stay away from cigarette smoke - both your smoke and the smoke from others.  · You may use over-the-counter acetaminophen or ibuprofen for fever, muscle aching, and headache, unless another medicine was prescribed for this. If you have chronic liver or kidney disease or ever had a stomach ulcer or GI bleeding, talk with your doctor before using these medicines. No one who is younger than 18 and ill with a fever should take aspirin. It may cause severe disease or death.  · Your appetite may be poor, so a light diet is fine. Avoid dehydration by drinking 8 to 12 8-ounce glasses of fluids each day. This may include water; orange juice; lemonade; apple, grape, and cranberry juice; clear fruit drinks; electrolyte replacement and sports drinks; and decaffeinated teas and coffee. If you have been diagnosed with a kidney disease, ask your doctor how much and what types of fluids you should drink to prevent dehydration. If you have kidney disease, drinking too much fluid can cause it build up in the your body and be dangerous to your " health.  · Over-the-counter remedies won't shorten the length of the illness but may be helpful for cough, sore throat; and nasal and sinus congestion. Don't use decongestants if you have high blood pressure.  Follow-up care  Follow up with your healthcare provider if you do not improve over the next week.  Call 911  Get emergency medical care if any of the following occur:  · Convulsion  · Feeling weak, dizzy, or like you are going to faint  · Chest pain, shortness of breath, wheezing, or difficulty breathing  When to seek medical advice  Call your healthcare provider right away if any of these occur:  · Cough with lots of colored sputum (mucus) or blood in your sputum  · Chest pain, shortness of breath, wheezing, or difficulty breathing  · Severe headache; face, neck, or ear pain  · Severe, constant pain in the lower right side of your belly (abdominal)  · Continued vomiting (cant keep liquids down)  · Frequent diarrhea (more than 5 times a day); blood (red or black color) or mucus in diarrhea  · Feeling weak, dizzy, or like you are going to faint  · Extreme thirst  · Fever of 100.4°F (38°C) or higher, or as directed by your healthcare provider  Date Last Reviewed: 9/25/2015  © 6157-5134 [employer]. [address] [place] [address], [place] [address]. All rights reserved. This information is not intended as a substitute for professional medical care. Always follow your healthcare professional's instructions.

## 2017-09-25 ENCOUNTER — OFFICE VISIT (OUTPATIENT)
Dept: OPHTHALMOLOGY | Facility: CLINIC | Age: 62
End: 2017-09-25
Payer: COMMERCIAL

## 2017-09-25 DIAGNOSIS — H40.1231 LOW-TENSION GLAUCOMA OF BOTH EYES, MILD STAGE: Primary | ICD-10-CM

## 2017-09-25 DIAGNOSIS — H25.13 NUCLEAR SCLEROSIS, BILATERAL: ICD-10-CM

## 2017-09-25 PROCEDURE — 92012 INTRM OPH EXAM EST PATIENT: CPT | Mod: S$GLB,,, | Performed by: OPHTHALMOLOGY

## 2017-09-25 PROCEDURE — 99999 PR PBB SHADOW E&M-EST. PATIENT-LVL II: CPT | Mod: PBBFAC,,, | Performed by: OPHTHALMOLOGY

## 2017-09-25 NOTE — PROGRESS NOTES
SUBJECTIVE:   Sophia Hope is a 62 y.o. female   Corrected distance visual acuity was 20/25 in the right eye and 20/25-1 in the left eye.   Chief Complaint   Patient presents with    Glaucoma     Combigan BID OU, Xalatan QHS OU        HPI:  HPI     Glaucoma    Additional comments: Combigan BID OU, Xalatan QHS OU           Comments   Patient denies changes from last visit. Using gtts as directed. 98%   compliant.      1. Mild LTG No FHx (init 17/17=21/21 adjusted for thin K's)  Goal <14  CCT /  2. Cataract OU    AT's prn OU    Combigan BID OU, Xalatan QHS OU       Last edited by Socorro Howard MA on 9/25/2017  1:49 PM. (History)        Assessment /Plan :  1. Low-tension glaucoma of both eyes, mild stage Doing well, IOP within acceptable range relative to target IOP and no evidence of progression. Continue current treatment. Reviewed importance of continued compliance with treatment and follow up.     2. Nuclear sclerosis, bilateral monitor for now       Return to clinic in 4 months  or as needed.  With 24-2 HVF, Dilation and SDP's

## 2017-10-23 RX ORDER — ALLOPURINOL 100 MG/1
TABLET ORAL
Qty: 30 TABLET | Refills: 5 | Status: SHIPPED | OUTPATIENT
Start: 2017-10-23 | End: 2018-04-30 | Stop reason: SDUPTHER

## 2017-11-17 ENCOUNTER — IMMUNIZATION (OUTPATIENT)
Dept: FAMILY MEDICINE | Facility: CLINIC | Age: 62
End: 2017-11-17
Payer: COMMERCIAL

## 2017-11-17 PROCEDURE — 90686 IIV4 VACC NO PRSV 0.5 ML IM: CPT | Mod: S$GLB,,, | Performed by: FAMILY MEDICINE

## 2017-11-17 PROCEDURE — 99999 PR PBB SHADOW E&M-EST. PATIENT-LVL II: CPT | Mod: PBBFAC,,,

## 2017-11-17 PROCEDURE — 90471 IMMUNIZATION ADMIN: CPT | Mod: S$GLB,,, | Performed by: FAMILY MEDICINE

## 2017-11-26 RX ORDER — LEVOTHYROXINE SODIUM 100 UG/1
TABLET ORAL
Qty: 30 TABLET | Refills: 5 | Status: CANCELLED | OUTPATIENT
Start: 2017-11-26

## 2017-12-01 RX ORDER — LEVOTHYROXINE SODIUM 100 UG/1
100 TABLET ORAL
Qty: 30 TABLET | Refills: 0 | Status: SHIPPED | OUTPATIENT
Start: 2017-12-01 | End: 2018-01-02 | Stop reason: SDUPTHER

## 2017-12-01 NOTE — TELEPHONE ENCOUNTER
----- Message from Carlie JUSTICE Clarence sent at 12/1/2017  8:21 AM CST -----  Contact: Pt   States she's calling to follow up on her refill req from [facility] and can be reached at [phone]     1. What is the name of the medication you are requesting? levothyroxin   2. What is the dose? Mg unknown   3. How do you take the medication? Orally, topically, etc? orally  4. How often do you take this medication? Once daily   5. Do you need a 30 day or 90 day supply? 30 days  6. How many refills are you requesting?   7. What is your preferred pharmacy and location of the pharmacy?   8. Who can we contact with further questions? Pt       [facility] [address]  Phone: [phone] Fax: [phone]

## 2018-01-02 RX ORDER — LEVOTHYROXINE SODIUM 100 UG/1
TABLET ORAL
Qty: 30 TABLET | Refills: 5 | Status: SHIPPED | OUTPATIENT
Start: 2018-01-02 | End: 2018-07-09 | Stop reason: SDUPTHER

## 2018-01-24 RX ORDER — IRBESARTAN 300 MG/1
300 TABLET ORAL DAILY
Qty: 30 TABLET | Refills: 5 | Status: SHIPPED | OUTPATIENT
Start: 2018-01-24 | End: 2018-08-04 | Stop reason: SDUPTHER

## 2018-01-26 ENCOUNTER — OFFICE VISIT (OUTPATIENT)
Dept: OPHTHALMOLOGY | Facility: CLINIC | Age: 63
End: 2018-01-26
Payer: COMMERCIAL

## 2018-01-26 DIAGNOSIS — H40.1231 LOW-TENSION GLAUCOMA OF BOTH EYES, MILD STAGE: Primary | ICD-10-CM

## 2018-01-26 DIAGNOSIS — H25.13 NUCLEAR SCLEROSIS, BILATERAL: ICD-10-CM

## 2018-01-26 PROCEDURE — 99999 PR PBB SHADOW E&M-EST. PATIENT-LVL I: CPT | Mod: PBBFAC,,, | Performed by: OPHTHALMOLOGY

## 2018-01-26 PROCEDURE — 92250 FUNDUS PHOTOGRAPHY W/I&R: CPT | Mod: S$GLB,,, | Performed by: OPHTHALMOLOGY

## 2018-01-26 PROCEDURE — 92014 COMPRE OPH EXAM EST PT 1/>: CPT | Mod: S$GLB,,, | Performed by: OPHTHALMOLOGY

## 2018-01-26 PROCEDURE — 92083 EXTENDED VISUAL FIELD XM: CPT | Mod: S$GLB,,, | Performed by: OPHTHALMOLOGY

## 2018-01-26 NOTE — PROGRESS NOTES
SUBJECTIVE:   Sophia Hope is a 62 y.o. female   Corrected distance visual acuity was 20/50 in the right eye and 20/40 in the left eye.   Chief Complaint   Patient presents with    Glaucoma     4m HVF SDP chk        HPI:  HPI     Glaucoma    Additional comments: 4m HVF SDP chk           Comments   Pt here for 4m HVF SDP chk. No pain or discomfort. VA stable. 90%   compliant with gtts.     1. Mild LTG No FHx (init 17/17=21/21 adjusted for thin K's)  Goal <14  CCT /  2. Cataract OU    AT's prn OU    Combigan BID OU  Xalatan QHS OU       Last edited by Delano Preciado, Patient Care Assistant on 1/26/2018  8:42   AM. (History)        Assessment /Plan :  1. Low-tension glaucoma of both eyes, mild stage Doing well, IOP within acceptable range relative to target IOP and no evidence of progression. Continue current treatment. Reviewed importance of continued compliance with treatment and follow up.  Return to clinic in 3-4 months  or as needed.  With Ascan, IOP Check, GOCT and Dilation     2. Nuclear sclerosis, bilateral OD>OS ready to schedule CE with iStent OD

## 2018-03-06 ENCOUNTER — OFFICE VISIT (OUTPATIENT)
Dept: FAMILY MEDICINE | Facility: CLINIC | Age: 63
End: 2018-03-06
Payer: COMMERCIAL

## 2018-03-06 ENCOUNTER — LAB VISIT (OUTPATIENT)
Dept: LAB | Facility: HOSPITAL | Age: 63
End: 2018-03-06
Attending: FAMILY MEDICINE
Payer: COMMERCIAL

## 2018-03-06 VITALS
HEART RATE: 88 BPM | TEMPERATURE: 96 F | OXYGEN SATURATION: 97 % | DIASTOLIC BLOOD PRESSURE: 70 MMHG | RESPIRATION RATE: 18 BRPM | SYSTOLIC BLOOD PRESSURE: 114 MMHG | HEIGHT: 67 IN | BODY MASS INDEX: 30.69 KG/M2 | WEIGHT: 195.56 LBS

## 2018-03-06 DIAGNOSIS — I10 ESSENTIAL HYPERTENSION: Chronic | ICD-10-CM

## 2018-03-06 DIAGNOSIS — M85.80 OSTEOPENIA, UNSPECIFIED LOCATION: Chronic | ICD-10-CM

## 2018-03-06 DIAGNOSIS — E79.0 HYPERURICEMIA: ICD-10-CM

## 2018-03-06 DIAGNOSIS — Z00.00 ANNUAL PHYSICAL EXAM: ICD-10-CM

## 2018-03-06 DIAGNOSIS — Z12.31 VISIT FOR SCREENING MAMMOGRAM: ICD-10-CM

## 2018-03-06 DIAGNOSIS — Z78.0 POSTMENOPAUSAL: ICD-10-CM

## 2018-03-06 DIAGNOSIS — H40.1290 LOW TENSION GLAUCOMA, UNSPECIFIED GLAUCOMA STAGE, UNSPECIFIED LATERALITY: ICD-10-CM

## 2018-03-06 DIAGNOSIS — E03.4 HYPOTHYROIDISM DUE TO ACQUIRED ATROPHY OF THYROID: Chronic | ICD-10-CM

## 2018-03-06 DIAGNOSIS — E66.9 OBESITY (BMI 30.0-34.9): ICD-10-CM

## 2018-03-06 DIAGNOSIS — Z00.00 ANNUAL PHYSICAL EXAM: Primary | ICD-10-CM

## 2018-03-06 LAB
BASOPHILS # BLD AUTO: 0.04 K/UL
BASOPHILS NFR BLD: 0.9 %
BILIRUB UR QL STRIP: NEGATIVE
CLARITY UR REFRACT.AUTO: CLEAR
COLOR UR AUTO: YELLOW
DIFFERENTIAL METHOD: ABNORMAL
EOSINOPHIL # BLD AUTO: 0.2 K/UL
EOSINOPHIL NFR BLD: 3.7 %
ERYTHROCYTE [DISTWIDTH] IN BLOOD BY AUTOMATED COUNT: 13.2 %
GLUCOSE UR QL STRIP: NEGATIVE
HCT VFR BLD AUTO: 39.9 %
HGB BLD-MCNC: 12.7 G/DL
HGB UR QL STRIP: NEGATIVE
IMM GRANULOCYTES # BLD AUTO: 0.01 K/UL
IMM GRANULOCYTES NFR BLD AUTO: 0.2 %
KETONES UR QL STRIP: NEGATIVE
LEUKOCYTE ESTERASE UR QL STRIP: NEGATIVE
LYMPHOCYTES # BLD AUTO: 1.4 K/UL
LYMPHOCYTES NFR BLD: 29.3 %
MCH RBC QN AUTO: 31.5 PG
MCHC RBC AUTO-ENTMCNC: 31.8 G/DL
MCV RBC AUTO: 99 FL
MONOCYTES # BLD AUTO: 0.5 K/UL
MONOCYTES NFR BLD: 10 %
NEUTROPHILS # BLD AUTO: 2.6 K/UL
NEUTROPHILS NFR BLD: 55.9 %
NITRITE UR QL STRIP: NEGATIVE
NRBC BLD-RTO: 0 /100 WBC
PH UR STRIP: 5 [PH] (ref 5–8)
PLATELET # BLD AUTO: 191 K/UL
PMV BLD AUTO: 11.5 FL
PROT UR QL STRIP: NEGATIVE
RBC # BLD AUTO: 4.03 M/UL
SP GR UR STRIP: 1.02 (ref 1–1.03)
URN SPEC COLLECT METH UR: NORMAL
UROBILINOGEN UR STRIP-ACNC: NEGATIVE EU/DL
WBC # BLD AUTO: 4.61 K/UL

## 2018-03-06 PROCEDURE — 84443 ASSAY THYROID STIM HORMONE: CPT

## 2018-03-06 PROCEDURE — 80053 COMPREHEN METABOLIC PANEL: CPT

## 2018-03-06 PROCEDURE — 99396 PREV VISIT EST AGE 40-64: CPT | Mod: S$GLB,,, | Performed by: FAMILY MEDICINE

## 2018-03-06 PROCEDURE — 80061 LIPID PANEL: CPT

## 2018-03-06 PROCEDURE — 88175 CYTOPATH C/V AUTO FLUID REDO: CPT

## 2018-03-06 PROCEDURE — 85025 COMPLETE CBC W/AUTO DIFF WBC: CPT

## 2018-03-06 PROCEDURE — 84550 ASSAY OF BLOOD/URIC ACID: CPT

## 2018-03-06 PROCEDURE — 81003 URINALYSIS AUTO W/O SCOPE: CPT

## 2018-03-06 PROCEDURE — 83735 ASSAY OF MAGNESIUM: CPT

## 2018-03-06 PROCEDURE — 99999 PR PBB SHADOW E&M-EST. PATIENT-LVL III: CPT | Mod: PBBFAC,,, | Performed by: FAMILY MEDICINE

## 2018-03-06 PROCEDURE — 36415 COLL VENOUS BLD VENIPUNCTURE: CPT | Mod: PO

## 2018-03-06 NOTE — PROGRESS NOTES
HISTORY OF PRESENT ILLNESS: Ms. Hope comes in today fasting and with taking medication without acute problems for annual wellness examination.    END OF LIFE DECISION: She has no living will and does not desire life support.    Current Outpatient Prescriptions   Medication Sig    allopurinol (ZYLOPRIM) 100 MG tablet take 1 tablet by mouth once daily    atenolol (TENORMIN) 100 MG tablet take 1 tablet by mouth once daily    calcium carbonate-vitamin D2 600 mg calcium- 200 unit Cap Take 1 tablet by mouth Twice daily.    COMBIGAN 0.2-0.5 % Drop instill 1 drop into both eyes twice a day    dextran 70-hypromellose (ARTIFICIAL TEARS) ophthalmic solution Place 1 drop into both eyes. BOTH EYES PRN     hydrochlorothiazide (MICROZIDE) 12.5 mg capsule take 1 capsule by mouth once daily    irbesartan (AVAPRO) 300 MG tablet Take 1 tablet (300 mg total) by mouth once daily.    latanoprost 0.005 % ophthalmic solution place 1 drop into both eyes at bedtime    levothyroxine (SYNTHROID) 100 MCG tablet take 1 tablet by mouth every morning before BREAKFAST    meloxicam (MOBIC) 15 MG tablet Take 1 tablet (15 mg total) by mouth once daily.     SCREENINGS:   Cholesterol: March 6, 2017.  FFS/Colonoscopy: April 14, 2014 - benign colon polyp, diverticulosis; repeat in 5 years.  Mammogram: March 15, 2017 - okay.  GYN Exam: March 6, 2017 - okay. Per GYN pap smear every 3 years but patient desires pap smear today.  Dexa Scan: March 9, 2015 - osteopenia; repeat in 5 years.  Eye Exam: January 26, 2018 with Dr. CATRACHO Scott and every 4 months for glaucoma surveillance and scheduled for May 25, 2018.  PPD: Never.  Immunizations: Td/Tdap - March 6, 2017.  Gardasil - N./A.  Zostavax - February 17, 2014.  Pneumovax - Never.  Seasonal Flu - November 17, 2017.    ROS:  GENERAL: Denies fever, chills, fatigue or unusual weight change. Appetite normal. Weight 88.5 kg (195 lb 1.7 oz) at September 6, 2017 visit. Monitors diet. Exercises  "daily.  SKIN: Denies rashes, itching, changes in mole, color or texture of skin or easy bruising.  HEAD: Denies headaches or recent head trauma.  EYES: Denies change in vision, pain, diplopia, redness or discharge. She wears glasses.  EARS: Denies ear pain, discharge, vertigo or decreased hearing.  NOSE: Denies loss of smell, epistaxis or rhinitis.  MOUTH & THROAT: Denies hoarseness or change in voice. Denies excessive gum bleeding or mouth sores. Denies sore throat.  NODES: Denies swollen glands.  CHEST: Denies LUCERO, wheezing, cough, or sputum production. Reports rare shortness of breath if overexerts herself.  CARDIOVASCULAR: Denies chest pain, PND, orthopnea or reduced exercise tolerance. Reports rare palpitations. Drinks coffee daily.  ABDOMEN: Denies diarrhea, constipation, nausea, vomiting, abdominal pain, or blood in stool.  URINARY: Denies flank pain, dysuria or hematuria.  GENITOURINARY: Denies flank pain, dysuria, frequency or hematuria. She occasionally performs monthly breast self exams.  ENDOCRINE: Denies diabetes or cholesterol problems.  HEME/LYMPH: Denies bleeding problems.  PERIPHERAL VASCULAR:Denies claudication or cyanosis.  MUSCULOSKELETAL: Denies joint stiffness, pain or swelling. Denies edema.  NEUROLOGIC: Denies history of seizures, tremors, paralysis, alteration of gait or coordination.  PSYCHIATRIC: Denies mood swings, depression, anxiety, homicidal or suicidal thoughts. Denies sleep problems.    PE:   VS: /70   Pulse 88   Temp 96 °F (35.6 °C) (Tympanic)   Resp 18   Ht 5' 6.5" (1.689 m)   Wt 88.7 kg (195 lb 8.8 oz)   SpO2 97%   BMI 31.09 kg/m²   APPEARANCE: Well nourished, well developed female, pleasant and obese, alert and oriented in no acute distress.   HEAD: Non tender. Full range of motion.  EYES: PERRL, conjunctiva pink, lids no edema.  EARS: External canal patent, no swelling or redness. TM's shiny and clear.  NOSE: Mucosa and turbinates pink, not swollen. No discharge. " Non tender sinuses.  THROAT: No pharyngeal erythema or exudate. No stridor.   NECK: Supple, no mass, thyroid not enlarged.  NODES: No cervical, axillary or inguinal lymph node enlargement.  CHEST: Normal respiratory effort. Lungs clear to auscultation.  CARDIOVASCULAR: Normal S1, S2. No rubs, murmurs or gallops. PMI not displaced. No carotid bruit. Pedal pulses palpable bilaterally. No edema.  ABDOMEN: Bowel sounds present. Not distended. Soft. No tenderness, masses or organomegaly.  BREAST EXAM: Symmetrical, no external lesions, no discharge, no masses palpated.  PELVIC EXAM: No external lesions noted, no discharge, cervix appears normal, bimanual exam showed no uterine abnormalities and no adnexal masses or tenderness. Urethra and bladder intact.  RECTAL EXAM: No external hemorrhoids or anal fissures. Heme-negative stool in the rectal vault.  MUSCULOSKELETAL: No joint deformities or stiffness. She is ambulatory without problems.  SKIN: No rashes or suspicious lesions, normal color and turgor.  NEUROLOGIC: Cranial Nerves: II-XII grossly intact. DTR's: Knees, Ankles 2+ and equal bilaterally. Gait & Posture: Normal gait and fine motion.  PSYCHIATRIC: Patient alert, oriented x 3. Mood/Affect normal without acute anxiety or depression noted. Judgment/insight good as she makes appropriate decisions during today's examination.     ASSESSMENT:    ICD-10-CM ICD-9-CM    1. Annual physical exam Z00.00 V70.0 CBC auto differential      TSH      Urinalysis      Comprehensive metabolic panel      Lipid panel      Uric acid      Liquid-based pap smear, screening      Magnesium   2. Essential hypertension I10 401.9    3. Hypothyroidism due to acquired atrophy of thyroid E03.4 244.8      246.8    4. Hyperuricemia E79.0 790.6    5. Osteopenia, unspecified location M85.80 733.90    6. Low tension glaucoma, unspecified glaucoma stage, unspecified laterality H40.1290 365.12      365.70    7. Obesity (BMI 30.0-34.9) E66.9 278.00    8.  Postmenopausal Z78.0 V49.81    9. Visit for screening mammogram Z12.31 V76.12 Mammo Digital Screening Bilat with CAD     PLAN:  1. Age-appropriate counseling-appropriate low-sodium, low-cholesterol, low carbohydrate diet and exercise daily, monthly breast self exam, annual wellness examination.   2. Patient advised to call for results.  3. Continue current medications.  4. Keep follow up with specialists.  5. Encouraged patient to wean from caffeine to see if will help with decrease of palpitations.  6. Follow-up in about 6 months (around 9/7/2018) for hypertension follow up.

## 2018-03-07 LAB
ALBUMIN SERPL BCP-MCNC: 3.4 G/DL
ALP SERPL-CCNC: 80 U/L
ALT SERPL W/O P-5'-P-CCNC: 16 U/L
ANION GAP SERPL CALC-SCNC: 8 MMOL/L
AST SERPL-CCNC: 23 U/L
BILIRUB SERPL-MCNC: 0.6 MG/DL
BUN SERPL-MCNC: 17 MG/DL
CALCIUM SERPL-MCNC: 9.5 MG/DL
CHLORIDE SERPL-SCNC: 106 MMOL/L
CHOLEST SERPL-MCNC: 168 MG/DL
CHOLEST/HDLC SERPL: 4.3 {RATIO}
CO2 SERPL-SCNC: 27 MMOL/L
CREAT SERPL-MCNC: 1 MG/DL
EST. GFR  (AFRICAN AMERICAN): >60 ML/MIN/1.73 M^2
EST. GFR  (NON AFRICAN AMERICAN): 59.7 ML/MIN/1.73 M^2
GLUCOSE SERPL-MCNC: 107 MG/DL
HDLC SERPL-MCNC: 39 MG/DL
HDLC SERPL: 23.2 %
LDLC SERPL CALC-MCNC: 102.4 MG/DL
MAGNESIUM SERPL-MCNC: 1.9 MG/DL
NONHDLC SERPL-MCNC: 129 MG/DL
POTASSIUM SERPL-SCNC: 4.2 MMOL/L
PROT SERPL-MCNC: 7.1 G/DL
SODIUM SERPL-SCNC: 141 MMOL/L
TRIGL SERPL-MCNC: 133 MG/DL
TSH SERPL DL<=0.005 MIU/L-ACNC: 0.84 UIU/ML
URATE SERPL-MCNC: 5.1 MG/DL

## 2018-04-03 ENCOUNTER — HOSPITAL ENCOUNTER (OUTPATIENT)
Dept: RADIOLOGY | Facility: HOSPITAL | Age: 63
Discharge: HOME | End: 2018-04-03
Attending: FAMILY MEDICINE
Payer: COMMERCIAL

## 2018-04-03 DIAGNOSIS — Z12.31 VISIT FOR SCREENING MAMMOGRAM: ICD-10-CM

## 2018-04-03 PROCEDURE — 77063 BREAST TOMOSYNTHESIS BI: CPT | Mod: 26,,, | Performed by: RADIOLOGY

## 2018-04-03 PROCEDURE — 77067 SCR MAMMO BI INCL CAD: CPT | Mod: 26,,, | Performed by: RADIOLOGY

## 2018-04-03 PROCEDURE — 77067 SCR MAMMO BI INCL CAD: CPT | Mod: TC,PO

## 2018-04-27 RX ORDER — HYDROCHLOROTHIAZIDE 12.5 MG/1
CAPSULE ORAL
Qty: 30 CAPSULE | Refills: 5 | Status: SHIPPED | OUTPATIENT
Start: 2018-04-27 | End: 2018-11-02 | Stop reason: SDUPTHER

## 2018-04-30 RX ORDER — ALLOPURINOL 100 MG/1
TABLET ORAL
Qty: 30 TABLET | Refills: 5 | Status: SHIPPED | OUTPATIENT
Start: 2018-04-30 | End: 2018-11-04 | Stop reason: SDUPTHER

## 2018-05-25 ENCOUNTER — OFFICE VISIT (OUTPATIENT)
Dept: OPHTHALMOLOGY | Facility: CLINIC | Age: 63
End: 2018-05-25
Payer: COMMERCIAL

## 2018-05-25 DIAGNOSIS — H40.1231 LOW-TENSION GLAUCOMA OF BOTH EYES, MILD STAGE: Primary | ICD-10-CM

## 2018-05-25 DIAGNOSIS — H26.9 CORTICAL CATARACT: ICD-10-CM

## 2018-05-25 DIAGNOSIS — H25.13 NUCLEAR SCLEROSIS, BILATERAL: ICD-10-CM

## 2018-05-25 PROCEDURE — 92133 CPTRZD OPH DX IMG PST SGM ON: CPT | Mod: S$GLB,,, | Performed by: OPHTHALMOLOGY

## 2018-05-25 PROCEDURE — 99999 PR PBB SHADOW E&M-EST. PATIENT-LVL II: CPT | Mod: PBBFAC,,, | Performed by: OPHTHALMOLOGY

## 2018-05-25 PROCEDURE — 92136 OPHTHALMIC BIOMETRY: CPT | Mod: RT,S$GLB,, | Performed by: OPHTHALMOLOGY

## 2018-05-25 PROCEDURE — 92014 COMPRE OPH EXAM EST PT 1/>: CPT | Mod: S$GLB,,, | Performed by: OPHTHALMOLOGY

## 2018-05-25 RX ORDER — GATIFLOXACIN 5 MG/ML
1 SOLUTION/ DROPS OPHTHALMIC 2 TIMES DAILY
Qty: 1 BOTTLE | Refills: 2 | Status: SHIPPED | OUTPATIENT
Start: 2018-05-25 | End: 2018-11-30 | Stop reason: ALTCHOICE

## 2018-05-25 RX ORDER — PREDNISOLONE ACETATE 10 MG/ML
1 SUSPENSION/ DROPS OPHTHALMIC 4 TIMES DAILY
Qty: 1 BOTTLE | Refills: 2 | Status: SHIPPED | OUTPATIENT
Start: 2018-05-25 | End: 2018-11-30 | Stop reason: ALTCHOICE

## 2018-05-25 RX ORDER — KETOROLAC TROMETHAMINE 5 MG/ML
1 SOLUTION OPHTHALMIC 4 TIMES DAILY
Qty: 1 BOTTLE | Refills: 2 | Status: SHIPPED | OUTPATIENT
Start: 2018-05-25 | End: 2018-11-30 | Stop reason: ALTCHOICE

## 2018-05-25 NOTE — PROGRESS NOTES
SUBJECTIVE:   Sophia Hope is a 62 y.o. female   Corrected distance visual acuity was 20/40 in the right eye and 20/30 in the left eye.   Chief Complaint   Patient presents with    Glaucoma        HPI:  HPI     Pt states no pain or discomfort. VA stable. Pt says that glare is a huge   problem for her. 98% compliant with gtts.     1. Mild LTG No FHx (init 17/17=21/21 adjusted for thin K's)  Goal <14  CCT /  2. Cataract OU  3. Ptosis    AT's prn OU    Combigan BID OU  Xalatan QHS OU    Last edited by Delano Preciado, Patient Care Assistant on 5/25/2018  8:31   AM. (History)        Assessment /Plan :  1. Low-tension glaucoma of both eyes, mild stage Doing well, IOP within acceptable range relative to target IOP and no evidence of progression. Continue current treatment. Reviewed importance of continued compliance with treatment and follow up.     2. Nuclear sclerosis, bilateral  Visually Significant Cataract OD > OS:   Patient reports decreased vision consistent with the clinical amount of lenticular opacity,  which reaches the level of visual significance and affects activities of daily living such as  drive. Risks, benefits, and alternatives to cataract surgery were  discussed.  IOL options were discussed, including Premium IOL'S and the associated  side effects and additional patient cost associated with them as well as patient's visual  goals. The pt expressed a desire to proceed with surgery with the potential for some  reasonable degree of visual improvement and was consented.  Risks of loss of vision and eye reviewed as well as possibility of need for spectacle correction after surgery even with premium implants. Verbal and written preop  instructions were provided to the pt.            Pt is interested in traditional monofocal IOL aiming for:       Distance OU and understands that  glasses will be generally needed at all times for near vision and often for finer distance correction especially for  higher degrees of astigmatism.           Final visual acuity may be limited by astigmatism and glaucoma damage    Pt wishes to have Phaco/IOL  OD     Requests a Monofocal IOL.    Will aim for distance    Other considerations: iStent       3. Cortical cataract

## 2018-06-29 ENCOUNTER — OFFICE VISIT (OUTPATIENT)
Dept: FAMILY MEDICINE | Facility: CLINIC | Age: 63
End: 2018-06-29
Payer: COMMERCIAL

## 2018-06-29 VITALS
HEART RATE: 92 BPM | SYSTOLIC BLOOD PRESSURE: 136 MMHG | WEIGHT: 193.56 LBS | OXYGEN SATURATION: 98 % | DIASTOLIC BLOOD PRESSURE: 90 MMHG | BODY MASS INDEX: 30.38 KG/M2 | HEIGHT: 67 IN | TEMPERATURE: 97 F

## 2018-06-29 DIAGNOSIS — R00.2 PALPITATIONS: Primary | ICD-10-CM

## 2018-06-29 DIAGNOSIS — E03.4 HYPOTHYROIDISM DUE TO ACQUIRED ATROPHY OF THYROID: Chronic | ICD-10-CM

## 2018-06-29 DIAGNOSIS — I10 ESSENTIAL HYPERTENSION: Chronic | ICD-10-CM

## 2018-06-29 PROCEDURE — 99214 OFFICE O/P EST MOD 30 MIN: CPT | Mod: 25,S$GLB,, | Performed by: FAMILY MEDICINE

## 2018-06-29 PROCEDURE — 93005 ELECTROCARDIOGRAM TRACING: CPT | Mod: 59,S$GLB,, | Performed by: FAMILY MEDICINE

## 2018-06-29 PROCEDURE — 93010 ELECTROCARDIOGRAM REPORT: CPT | Mod: S$GLB,,, | Performed by: INTERNAL MEDICINE

## 2018-06-29 PROCEDURE — 99999 PR PBB SHADOW E&M-EST. PATIENT-LVL III: CPT | Mod: PBBFAC,,, | Performed by: FAMILY MEDICINE

## 2018-06-29 PROCEDURE — 3080F DIAST BP >= 90 MM HG: CPT | Mod: CPTII,S$GLB,, | Performed by: FAMILY MEDICINE

## 2018-06-29 PROCEDURE — 3008F BODY MASS INDEX DOCD: CPT | Mod: CPTII,S$GLB,, | Performed by: FAMILY MEDICINE

## 2018-06-29 PROCEDURE — 93005 ELECTROCARDIOGRAM TRACING: CPT | Mod: S$GLB,,, | Performed by: FAMILY MEDICINE

## 2018-06-29 PROCEDURE — 3075F SYST BP GE 130 - 139MM HG: CPT | Mod: CPTII,S$GLB,, | Performed by: FAMILY MEDICINE

## 2018-06-29 NOTE — PROGRESS NOTES
Sophiapratima Hope    Chief Complaint   Patient presents with    Palpitations       History of Present Illness:   Ms. Hope comes in today for evaluation of palpitations.  She states she continues to have palpitations since March despite no longer drinking caffeinated coffee.  However, she states she continues to drink Coke on rare episodes and eats chocolate.  She denies associated chest pain, leg swelling, shortness of breath, cough, wheezing, fever, chills, fatigue, change of appetite, headaches, unusual urinary symptoms, back pain, anxiety, depression, homicidal or suicidal thoughts abdominal pain, nausea, vomiting, diarrhea, constipation, reflux symptoms but states she burps more.  She states she eats spicy foods but does not eat peppermint, late night eats, uses NSAID's.  She denies tobacco, alcohol or illicit drug use.    She states she took blood pressure medication today but feels her blood pressure is likely elevated due to the heavy traffic which she traveled through to get to her visit today.        EKG ordered and interpreted by me - vent rate 81 bpm, sinus rhythm with occasional premature ventricular complexes, moderate voltage criteria for LVH, may be normal variant, borderline ECG.      Labs:                     NA                       141                 03/06/2018                 K                        4.2                 03/06/2018                 CL                       106                 03/06/2018                 CO2                      27                  03/06/2018                 BUN                      17                  03/06/2018                 CREATININE               1.0                 03/06/2018                 CALCIUM                  9.5                 03/06/2018                 ANIONGAP                 8                   03/06/2018                 ESTGFRAFRICA             >60.0               03/06/2018                 EGFRNONAA                59.7 (A)             03/06/2018         TSH                      0.837               03/06/2018                 Magnesium  1.9             03/06/2018          Palpitations    Pertinent negatives include no anxiety, chest pain, coughing, fever, nausea, shortness of breath or vomiting.       Current Outpatient Prescriptions   Medication Sig    allopurinol (ZYLOPRIM) 100 MG tablet take 1 tablet by mouth once daily    atenolol (TENORMIN) 100 MG tablet take 1 tablet by mouth once daily    calcium carbonate-vitamin D2 600 mg calcium- 200 unit Cap Take 1 tablet by mouth Twice daily.    COMBIGAN 0.2-0.5 % Drop instill 1 drop into both eyes twice a day    dextran 70-hypromellose (ARTIFICIAL TEARS) ophthalmic solution Place 1 drop into both eyes. BOTH EYES PRN     gatifloxacin 0.5 % Drop drops Apply 1 drop to eye 2 (two) times daily. Start one day before surgery    hydroCHLOROthiazide (MICROZIDE) 12.5 mg capsule take 1 capsule by mouth once daily    irbesartan (AVAPRO) 300 MG tablet Take 1 tablet (300 mg total) by mouth once daily.    ketorolac 0.5% (ACULAR) 0.5 % Drop Place 1 drop into the right eye 4 (four) times daily. Eyedrops to start one day before surgery    latanoprost 0.005 % ophthalmic solution place 1 drop into both eyes at bedtime    levothyroxine (SYNTHROID) 100 MCG tablet take 1 tablet by mouth every morning before BREAKFAST    meloxicam (MOBIC) 15 MG tablet Take 1 tablet (15 mg total) by mouth once daily.    prednisoLONE acetate (PRED FORTE) 1 % DrpS Place 1 drop into the right eye 4 (four) times daily. Start one day before surgery         Review of Systems   Constitutional: Negative for appetite change, chills, fatigue and fever.        Weight 88.7 kg (195 lb 8.8 oz) at March 6, 2018 visit.   Respiratory: Negative for cough, shortness of breath and wheezing.    Cardiovascular: Positive for palpitations. Negative for chest pain and leg swelling.   Gastrointestinal: Negative for abdominal pain, constipation, diarrhea,  nausea and vomiting.        Positive for burping.   Genitourinary: Negative for difficulty urinating.   Musculoskeletal: Negative for back pain.   Neurological: Negative for headaches.   Psychiatric/Behavioral: Negative for dysphoric mood and suicidal ideas. The patient is not nervous/anxious.         Negative for homicidal ideas.       Objective:  Physical Exam   Constitutional: She is oriented to person, place, and time. She appears well-developed and well-nourished. No distress.   HENT:   Mouth/Throat: Oropharynx is clear and moist. No oropharyngeal exudate.   Neck: Normal range of motion. Neck supple. No thyromegaly present.   Cardiovascular: Normal rate, regular rhythm, normal heart sounds and intact distal pulses.    No murmur heard.  Pulmonary/Chest: Effort normal and breath sounds normal. No respiratory distress. She has no wheezes.   Abdominal: Soft. Bowel sounds are normal. She exhibits no distension and no mass. There is no tenderness. There is no rebound and no guarding.   Musculoskeletal: Normal range of motion. She exhibits no edema.   She is ambulatory without problems.   Lymphadenopathy:     She has no cervical adenopathy.   Neurological: She is alert and oriented to person, place, and time.   Skin: She is not diaphoretic.   Psychiatric: She has a normal mood and affect. Her behavior is normal. Judgment and thought content normal.   Vitals reviewed.      ASSESSMENT:  1. Palpitations    2. Essential hypertension    3. Hypothyroidism due to acquired atrophy of thyroid        PLAN:  Sophia was seen today for palpitations.    Diagnoses and all orders for this visit:    Palpitations  -     IN OFFICE EKG 12-LEAD (to Muse)  -     Holter monitor - 48 hour; Future    Essential hypertension    Hypothyroidism due to acquired atrophy of thyroid    Patient advised to call for results. If work up within acceptable range and symptoms persist, I discussed with patient consider adding low dose beta blocker  daily.  Continue current medications, follow low sodium, low cholesterol, low carb diet, daily walks.  Avoid all caffeine, spicy foods.  Follow-up if symptoms worsen or fail to improve.

## 2018-07-03 ENCOUNTER — TELEPHONE (OUTPATIENT)
Dept: OPHTHALMOLOGY | Facility: CLINIC | Age: 63
End: 2018-07-03

## 2018-07-03 NOTE — TELEPHONE ENCOUNTER
Patient called she has become medicare eligible as of 7-1-18 and she will bring her card in next week

## 2018-07-06 ENCOUNTER — CLINICAL SUPPORT (OUTPATIENT)
Dept: CARDIOLOGY | Facility: CLINIC | Age: 63
End: 2018-07-06
Attending: FAMILY MEDICINE
Payer: MEDICARE

## 2018-07-06 DIAGNOSIS — R00.2 PALPITATIONS: ICD-10-CM

## 2018-07-06 PROCEDURE — 93226 XTRNL ECG REC<48 HR SCAN A/R: CPT | Mod: PBBFAC,PO | Performed by: NUCLEAR MEDICINE

## 2018-07-06 PROCEDURE — 93227 XTRNL ECG REC<48 HR R&I: CPT | Mod: S$PBB,,, | Performed by: NUCLEAR MEDICINE

## 2018-07-09 ENCOUNTER — PATIENT MESSAGE (OUTPATIENT)
Dept: FAMILY MEDICINE | Facility: CLINIC | Age: 63
End: 2018-07-09

## 2018-07-09 ENCOUNTER — TELEPHONE (OUTPATIENT)
Dept: FAMILY MEDICINE | Facility: CLINIC | Age: 63
End: 2018-07-09

## 2018-07-09 RX ORDER — LEVOTHYROXINE SODIUM 100 UG/1
TABLET ORAL
Qty: 30 TABLET | Refills: 5 | Status: SHIPPED | OUTPATIENT
Start: 2018-07-09 | End: 2019-01-14 | Stop reason: SDUPTHER

## 2018-07-09 NOTE — TELEPHONE ENCOUNTER
----- Message from Mara Garcia sent at 7/9/2018  9:10 AM CDT -----  Contact: patient  Caller states she returned the Holter monitor this morning. please call patient @ [phone]. Thanks, anel

## 2018-07-12 DIAGNOSIS — R00.2 PALPITATIONS: Primary | ICD-10-CM

## 2018-07-17 ENCOUNTER — OFFICE VISIT (OUTPATIENT)
Dept: CARDIOLOGY | Facility: CLINIC | Age: 63
End: 2018-07-17
Payer: MEDICARE

## 2018-07-17 VITALS
WEIGHT: 192 LBS | BODY MASS INDEX: 30.13 KG/M2 | HEART RATE: 72 BPM | SYSTOLIC BLOOD PRESSURE: 136 MMHG | HEIGHT: 67 IN | DIASTOLIC BLOOD PRESSURE: 78 MMHG

## 2018-07-17 DIAGNOSIS — I49.1 PAC (PREMATURE ATRIAL CONTRACTION): ICD-10-CM

## 2018-07-17 DIAGNOSIS — I10 ESSENTIAL HYPERTENSION: Primary | Chronic | ICD-10-CM

## 2018-07-17 DIAGNOSIS — I49.3 PVC'S (PREMATURE VENTRICULAR CONTRACTIONS): ICD-10-CM

## 2018-07-17 DIAGNOSIS — R07.89 ATYPICAL CHEST PAIN: ICD-10-CM

## 2018-07-17 DIAGNOSIS — R94.31 ABNORMAL ECG: ICD-10-CM

## 2018-07-17 DIAGNOSIS — R00.2 PALPITATIONS: ICD-10-CM

## 2018-07-17 PROCEDURE — 99999 PR PBB SHADOW E&M-EST. PATIENT-LVL III: CPT | Mod: PBBFAC,,, | Performed by: INTERNAL MEDICINE

## 2018-07-17 PROCEDURE — 99213 OFFICE O/P EST LOW 20 MIN: CPT | Mod: PBBFAC,PO | Performed by: INTERNAL MEDICINE

## 2018-07-17 PROCEDURE — 99204 OFFICE O/P NEW MOD 45 MIN: CPT | Mod: S$PBB,,, | Performed by: INTERNAL MEDICINE

## 2018-07-17 RX ORDER — ATENOLOL 50 MG/1
50 TABLET ORAL DAILY
Qty: 30 TABLET | Refills: 11 | Status: SHIPPED | OUTPATIENT
Start: 2018-07-17 | End: 2018-07-20 | Stop reason: DRUGHIGH

## 2018-07-17 NOTE — LETTER
July 17, 2018      Tamela Lance MD  [address]  [place] [address]           [facility]  [address] [place] [address]  Phone: [phone]  Fax: [phone]          Patient: Sophia Hope   MR Number: [id number]   YOB: 1955   Date of Visit: 7/17/2018       Dear Dr. Tamela Lance:    Thank you for referring Sophia Hope to me for evaluation. Attached you will find relevant portions of my assessment and plan of care.    If you have questions, please do not hesitate to call me. I look forward to following Sophia Hope along with you.    Sincerely,    Isaiah Darling MD    Enclosure  CC:  No Recipients    If you would like to receive this communication electronically, please contact [email or web address] or [phone] to request more information on [employer] access.    For providers and/or their staff who would like to refer a patient to [facility], please contact us through our one-stop-shop provider referral line, [facility], at [phone].    If you feel you have received this communication in error or would no longer like to receive these types of communications, please e-mail [email or web address]

## 2018-07-17 NOTE — PROGRESS NOTES
"Subjective:    Patient ID:  Sophia Hope is a 62 y.o. female who presents for evaluation of Palpitations      Pt referred by Dr. Tamela Lance      HPI pt presents for palpitations.  Has h/o HTN.  Nonsmoker.   Saw PCP recently, had some intermittent palpitations.  Holter showed NSR, occasional PVCs, rare PACs, fleeting 5 beat EAT.  ecg 6/29/18 NSR, PVC, LVH.   Sometimes she feels a "pressure" in her chest.  A while later has a burp.  No exertional cp sxs.  Cp lasts brief period of time.  CP is infrequent, and not acute.  She has cut back on caffeine.        Current Outpatient Prescriptions:     allopurinol (ZYLOPRIM) 100 MG tablet, take 1 tablet by mouth once daily, Disp: 30 tablet, Rfl: 5    atenolol (TENORMIN) 100 MG tablet, take 1 tablet by mouth once daily, Disp: 30 tablet, Rfl: 5    calcium carbonate-vitamin D2 600 mg calcium- 200 unit Cap, Take 1 tablet by mouth Twice daily., Disp: , Rfl:     COMBIGAN 0.2-0.5 % Drop, instill 1 drop into both eyes twice a day, Disp: 5 mL, Rfl: 6    hydroCHLOROthiazide (MICROZIDE) 12.5 mg capsule, take 1 capsule by mouth once daily, Disp: 30 capsule, Rfl: 5    irbesartan (AVAPRO) 300 MG tablet, Take 1 tablet (300 mg total) by mouth once daily., Disp: 30 tablet, Rfl: 5    latanoprost 0.005 % ophthalmic solution, place 1 drop into both eyes at bedtime, Disp: 2.5 mL, Rfl: 12    levothyroxine (SYNTHROID) 100 MCG tablet, take 1 tablet by mouth every morning before BREAKFAST, Disp: 30 tablet, Rfl: 5    atenolol (TENORMIN) 50 MG tablet, Take 1 tablet (50 mg total) by mouth once daily., Disp: 30 tablet, Rfl: 11    dextran 70-hypromellose (ARTIFICIAL TEARS) ophthalmic solution, Place 1 drop into both eyes. BOTH EYES PRN , Disp: , Rfl:     gatifloxacin 0.5 % Drop drops, Apply 1 drop to eye 2 (two) times daily. Start one day before surgery, Disp: 1 Bottle, Rfl: 2    ketorolac 0.5% (ACULAR) 0.5 % Drop, Place 1 drop into the right eye 4 (four) times daily. Eyedrops to start one " "day before surgery, Disp: 1 Bottle, Rfl: 2    meloxicam (MOBIC) 15 MG tablet, Take 1 tablet (15 mg total) by mouth once daily., Disp: 30 tablet, Rfl: 6    prednisoLONE acetate (PRED FORTE) 1 % DrpS, Place 1 drop into the right eye 4 (four) times daily. Start one day before surgery, Disp: 1 Bottle, Rfl: 2      Review of Systems   Constitution: Negative.   HENT: Negative.    Eyes: Negative.    Cardiovascular: Positive for chest pain and palpitations.   Respiratory: Negative.    Endocrine: Negative.    Hematologic/Lymphatic: Negative.    Skin: Negative.    Musculoskeletal: Negative.    Gastrointestinal: Negative.    Genitourinary: Negative.    Neurological: Negative.    Psychiatric/Behavioral: Negative.    Allergic/Immunologic: Negative.        /78 (BP Location: Right arm, Patient Position: Sitting, BP Method: Large (Manual))   Pulse 72   Ht 5' 6.5" (1.689 m)   Wt 87.1 kg (192 lb 0.3 oz)   BMI 30.53 kg/m²     Wt Readings from Last 3 Encounters:   07/17/18 87.1 kg (192 lb 0.3 oz)   06/29/18 87.8 kg (193 lb 9 oz)   03/06/18 88.7 kg (195 lb 8.8 oz)     Temp Readings from Last 3 Encounters:   06/29/18 97.4 °F (36.3 °C) (Tympanic)   03/06/18 96 °F (35.6 °C) (Tympanic)   09/20/17 98.7 °F (37.1 °C) (Tympanic)     BP Readings from Last 3 Encounters:   07/17/18 136/78   06/29/18 (!) 136/90   03/06/18 114/70     Pulse Readings from Last 3 Encounters:   07/17/18 72   06/29/18 92   03/06/18 88          Objective:    Physical Exam   Constitutional: She is oriented to person, place, and time. Vital signs are normal. She appears well-developed and well-nourished. She is active and cooperative. She does not have a sickly appearance. She does not appear ill. No distress.   HENT:   Head: Normocephalic.   Neck: Neck supple. Normal carotid pulses, no hepatojugular reflux and no JVD present. Carotid bruit is not present. No thyromegaly present.   Cardiovascular: Normal rate, regular rhythm, S1 normal, S2 normal, normal heart " sounds and normal pulses.  PMI is not displaced.  Exam reveals no gallop and no friction rub.    No murmur heard.  Pulses:       Radial pulses are 2+ on the right side, and 2+ on the left side.   Pulmonary/Chest: Effort normal and breath sounds normal. She has no wheezes. She has no rales.   Abdominal: Soft. Normal appearance, normal aorta and bowel sounds are normal. She exhibits no pulsatile liver, no abdominal bruit, no ascites and no mass. There is no splenomegaly or hepatomegaly. There is no tenderness.   Musculoskeletal: She exhibits edema.   Lymphadenopathy:     She has no cervical adenopathy.   Neurological: She is alert and oriented to person, place, and time.   Skin: Skin is warm. She is not diaphoretic.   Psychiatric: She has a normal mood and affect. Her behavior is normal.   Nursing note and vitals reviewed.      I have reviewed all pertinent labs and cardiac studies.      Chemistry        Component Value Date/Time     03/06/2018 0823    K 4.2 03/06/2018 0823     03/06/2018 0823    CO2 27 03/06/2018 0823    BUN 17 03/06/2018 0823    CREATININE 1.0 03/06/2018 0823     03/06/2018 0823        Component Value Date/Time    CALCIUM 9.5 03/06/2018 0823    ALKPHOS 80 03/06/2018 0823    AST 23 03/06/2018 0823    ALT 16 03/06/2018 0823    BILITOT 0.6 03/06/2018 0823    ESTGFRAFRICA >60.0 03/06/2018 0823    EGFRNONAA 59.7 (A) 03/06/2018 0823        Lab Results   Component Value Date    WBC 4.61 03/06/2018    HGB 12.7 03/06/2018    HCT 39.9 03/06/2018    MCV 99 (H) 03/06/2018     03/06/2018       Lab Results   Component Value Date    TSH 0.837 03/06/2018     Lab Results   Component Value Date    CHOL 168 03/06/2018    CHOL 198 03/06/2017    CHOL 187 02/18/2016     Lab Results   Component Value Date    HDL 39 (L) 03/06/2018    HDL 36 (L) 03/06/2017    HDL 38 (L) 02/18/2016     Lab Results   Component Value Date    LDLCALC 102.4 03/06/2018    LDLCALC 136.0 03/06/2017    LDLCALC 119.8  02/18/2016     Lab Results   Component Value Date    TRIG 133 03/06/2018    TRIG 130 03/06/2017    TRIG 146 02/18/2016     Lab Results   Component Value Date    CHOLHDL 23.2 03/06/2018    CHOLHDL 18.2 (L) 03/06/2017    CHOLHDL 20.3 02/18/2016     Date of Procedure: 07/06/2018    PRE-TEST DATA   The diary was properly completed.        TEST DESCRIPTION   PREDOMINANT RHYTHM  1. Sinus rhythm with heart rates varying between 52 and 148 bpm with an average of 80 bpm.     VENTRICULAR ARRHYTHMIAS  1. There were occasional monomorphic PVCs totalling 1033 and averaging 21 per hour.  There was 1 monomorphic couplet.    2. There were no episodes of ventricular tachycardia.    SUPRA VENTRICULAR ARRHYTHMIAS  1. There were very rare PACs recorded totalling 21 and averaging less than 1 per hour.     2. There was a single (5 beat) run of non-sustained EAT. The onset of SVT was post PAC.     SINUS NODE FUNCTION  1. There was rare intermittent sinus arrhythmia.     AV CONDUCTION  1. There was no evidence of high grade AV block.     DIARY  1. The diary was properly completed.     2. There was 1 episode of palpitations reported. The corresponding rhythm strips revealed the following:             During event 1 (sitting down) the rhythm was sinus rhythm at 74 bpm, with no ectopy.     3. There was 1 episode of palpitations reported. The corresponding rhythm strips revealed the following:             During event 1 (sitting down) the rhythm was sinus rhythm at 80 bpm, with no ectopy.     MISCELLANEOUS  1. This was a tape of adequate length (48 hrs).             This document has been electronically    SIGNED BY: Zeus Guerrero MD On: 07/09/2018 13:08      Assessment:       1. Essential hypertension    2. Palpitations    3. PVC's (premature ventricular contractions)    4. PAC (premature atrial contraction)    5. Abnormal ECG    6. Atypical chest pain         Plan:             Palpitations due to PVCs/PACs, fleeting EAT.  Increase  Atenolol from 100 mg qd to 150 mg qd.  She will call and let us know if it is helping sxs; if not and for some reason feels worse, can always drop back to 100 mg dosage.  Continue to limit caffeine intake.  Atypical cp, possibly gerd; however not great history on it and due to risk factors for CAD, will proceed with stress test.  Echo to further evaluate LVH noted on ecg.  Cardiac diet  Daily exercise    Phone review for test results.

## 2018-07-19 ENCOUNTER — TELEPHONE (OUTPATIENT)
Dept: FAMILY MEDICINE | Facility: CLINIC | Age: 63
End: 2018-07-19

## 2018-07-19 DIAGNOSIS — I10 ESSENTIAL HYPERTENSION: Primary | Chronic | ICD-10-CM

## 2018-07-19 DIAGNOSIS — R00.2 PALPITATIONS: ICD-10-CM

## 2018-07-19 NOTE — TELEPHONE ENCOUNTER
----- Message from Lexy Jasso sent at 7/19/2018  7:12 AM CDT -----  Contact: pt  Pt states she need to speak with Dr Lance only, please call pt @ [phone], regarding medication change.

## 2018-07-19 NOTE — TELEPHONE ENCOUNTER
Spoke w/pt and she would like to speak to Dr Lance directly to discuss a medication change. States she was referred to cardiology and they are wanting to add Atenolol to her med regime. However, she states that she was taken off of this medication in Dec 2016 due to an issue with uric acid and gout flare up. She has some concerns she would like to personally discuss with Dr Lance.

## 2018-07-20 RX ORDER — ATENOLOL 100 MG/1
100 TABLET ORAL DAILY
Qty: 30 TABLET | Refills: 5 | Status: SHIPPED | OUTPATIENT
Start: 2018-07-20 | End: 2018-09-07 | Stop reason: SDUPTHER

## 2018-07-20 NOTE — TELEPHONE ENCOUNTER
I spoke with pt. She clarified she had not been taking Atenolol 100 mg daily since perhaps May 2017 (as she confirmed w/pharmacy that she had not been getting refills). So, I advised her to take Atenolol 50 mg - 2 pills daily until gone, then  Rx for Atenolol 100 mg daily. Continue other medications as listed. She verbalized understanding.

## 2018-07-26 ENCOUNTER — OFFICE VISIT (OUTPATIENT)
Dept: OPHTHALMOLOGY | Facility: CLINIC | Age: 63
End: 2018-07-26
Payer: MEDICARE

## 2018-07-26 DIAGNOSIS — Z98.890 POST-OPERATIVE STATE: Primary | ICD-10-CM

## 2018-07-26 PROCEDURE — 99024 POSTOP FOLLOW-UP VISIT: CPT | Mod: POP,,, | Performed by: OPHTHALMOLOGY

## 2018-07-26 PROCEDURE — 99212 OFFICE O/P EST SF 10 MIN: CPT | Mod: PBBFAC,PO | Performed by: OPHTHALMOLOGY

## 2018-07-26 PROCEDURE — 99999 PR PBB SHADOW E&M-EST. PATIENT-LVL II: CPT | Mod: PBBFAC,,, | Performed by: OPHTHALMOLOGY

## 2018-07-26 NOTE — PROGRESS NOTES
SUBJECTIVE:   Sophia Hope is a 62 y.o. female   Uncorrected distance visual acuity was 20/30 in the right eye and not recorded in the left eye.   Chief Complaint   Patient presents with    Post-op Evaluation        HPI:  HPI     Patient states 0/10 on pain scale.        1. Mild LTG No FHx (init 17/17=21/21 adjusted for thin K's)  Goal <14  CCT /  2. Cataract OS  PCIOL / I-Stent OD +20.5 SN60WF (distance) 7-25-18  3. Ptosis    AT's prn OU    OD GAT BID, PRED QID, KET QID  Combigan BID OU  Xalatan QHS OU    Last edited by RAYNA Pa on 7/26/2018  8:06 AM. (History)        Assessment /Plan :  1. Post-operative state Exam:  SLE:  S/C: normal  K : trace k fold  AC: trace cell and flare  Iris: normal  Lens: PCIOL    IMP:  PO Day 1 S/P Phaco/IOL OD with I stent: Doing well.    Plan:  Continue gtts to operative eye:  Pred 1% QID  Ketorolac QID  Zymaxid BID  Reinstructed in importance of absolute compliance with Post-OP instructions including medications, shield at bedtime, and limitation of activities. Follow up appointments in approximately one and six weeks or call immediately for increased pain, redness or vision loss.

## 2018-08-01 ENCOUNTER — OFFICE VISIT (OUTPATIENT)
Dept: OPHTHALMOLOGY | Facility: CLINIC | Age: 63
End: 2018-08-01
Payer: MEDICARE

## 2018-08-01 DIAGNOSIS — Z98.890 POST-OPERATIVE STATE: Primary | ICD-10-CM

## 2018-08-01 PROCEDURE — 99999 PR PBB SHADOW E&M-EST. PATIENT-LVL II: CPT | Mod: PBBFAC,,, | Performed by: OPHTHALMOLOGY

## 2018-08-01 PROCEDURE — 99024 POSTOP FOLLOW-UP VISIT: CPT | Mod: POP,,, | Performed by: OPHTHALMOLOGY

## 2018-08-01 PROCEDURE — 99212 OFFICE O/P EST SF 10 MIN: CPT | Mod: PBBFAC,PO | Performed by: OPHTHALMOLOGY

## 2018-08-01 NOTE — PROGRESS NOTES
SUBJECTIVE:   Sophia Hope is a 63 y.o. female   Uncorrected distance visual acuity was 20/20 -2 in the right eye and not recorded in the left eye.   Chief Complaint   Patient presents with    Post-op Evaluation     1 week PCIOL OD w/iStent 7/26/17, OD: Gatifloxacin BID, Ket and Pred QID        HPI:  HPI     Post-op Evaluation    Additional comments: 1 week PCIOL OD w/iStent 7/26/17, OD: Gatifloxacin   BID, Ket and Pred QID           Comments   Pt states no pain or irritation in the right eye. Using her drops as   directed. Vision has improved since last week.     1. Mild LTG No FHx (init 17/17=21/21 adjusted for thin K's)  Goal <14  CCT /  2. Cataract OS  PCIOL / I-Stent OD +20.5 SN60WF (distance) 7-25-18  3. Ptosis    AT's prn OU    OD GAT BID, PRED QID, KET QID  Combigan BID OU  Xalatan QHS OU       Last edited by Shane Smith on 8/1/2018  3:22 PM. (History)        Assessment /Plan :  1. Post-operative state Slit lamp exam:  L/L: nl  K: clear, wound sealed  AC: trace cell and flare  Iris/Lens: IOL centered and stable      IMP:  PO Week 1 S/P Phaco/ IOL with Istent OD : Doing well with no evidence of infection or abnormal inflammation.     Plan:  Pt given and instructed in one week PO instructions. D/C zymaxid and start to taper off Ketorlac and Pred Forte 1% weekly. Can resume normal activitites and d/c eye shield. OTC reading glasses can be used until evaluated for final MR. Follow up in one month or PRN pain, redness, vision loss, or other concerns.

## 2018-08-06 ENCOUNTER — HOSPITAL ENCOUNTER (OUTPATIENT)
Dept: RADIOLOGY | Facility: HOSPITAL | Age: 63
Discharge: HOME | End: 2018-08-06
Attending: INTERNAL MEDICINE
Payer: MEDICARE

## 2018-08-06 ENCOUNTER — CLINICAL SUPPORT (OUTPATIENT)
Dept: CARDIOLOGY | Facility: CLINIC | Age: 63
End: 2018-08-06
Attending: INTERNAL MEDICINE
Payer: MEDICARE

## 2018-08-06 ENCOUNTER — TELEPHONE (OUTPATIENT)
Dept: CARDIOLOGY | Facility: HOSPITAL | Age: 63
End: 2018-08-06

## 2018-08-06 DIAGNOSIS — I49.1 PAC (PREMATURE ATRIAL CONTRACTION): ICD-10-CM

## 2018-08-06 DIAGNOSIS — I49.3 PVC'S (PREMATURE VENTRICULAR CONTRACTIONS): ICD-10-CM

## 2018-08-06 DIAGNOSIS — R07.89 ATYPICAL CHEST PAIN: ICD-10-CM

## 2018-08-06 DIAGNOSIS — R00.2 PALPITATIONS: ICD-10-CM

## 2018-08-06 DIAGNOSIS — I10 ESSENTIAL HYPERTENSION: Chronic | ICD-10-CM

## 2018-08-06 DIAGNOSIS — R94.31 ABNORMAL ECG: ICD-10-CM

## 2018-08-06 LAB
DIASTOLIC DYSFUNCTION: NO
DIASTOLIC DYSFUNCTION: NO
ESTIMATED PA SYSTOLIC PRESSURE: 36.41
RETIRED EF AND QEF - SEE NOTES: 60 (ref 55–65)

## 2018-08-06 PROCEDURE — 93018 CV STRESS TEST I&R ONLY: CPT | Mod: S$PBB,,, | Performed by: NUCLEAR MEDICINE

## 2018-08-06 PROCEDURE — 78452 HT MUSCLE IMAGE SPECT MULT: CPT | Mod: 26,,, | Performed by: NUCLEAR MEDICINE

## 2018-08-06 PROCEDURE — 93017 CV STRESS TEST TRACING ONLY: CPT | Mod: PBBFAC,PO | Performed by: NUCLEAR MEDICINE

## 2018-08-06 PROCEDURE — 78452 HT MUSCLE IMAGE SPECT MULT: CPT | Mod: TC,PO

## 2018-08-06 PROCEDURE — 93016 CV STRESS TEST SUPVJ ONLY: CPT | Mod: S$PBB,,, | Performed by: NUCLEAR MEDICINE

## 2018-08-06 PROCEDURE — 93306 TTE W/DOPPLER COMPLETE: CPT | Mod: PBBFAC,PO | Performed by: NUCLEAR MEDICINE

## 2018-08-06 RX ORDER — IRBESARTAN 300 MG/1
TABLET ORAL
Qty: 30 TABLET | Refills: 5 | Status: SHIPPED | OUTPATIENT
Start: 2018-08-06 | End: 2018-09-13 | Stop reason: SDUPTHER

## 2018-08-06 NOTE — TELEPHONE ENCOUNTER
Please call pt  She passed her nuclear stress test.  No blockages noted  Echo shows normal heart strength/function.    Dr Darling

## 2018-08-14 NOTE — TELEPHONE ENCOUNTER
----- Message from Ynes Snyder sent at 8/14/2018  2:39 PM CDT -----  Contact: Pt   Pt request her Latanprost Eye Drops be sent over to the Pharmacy . ..      [id number] [place] [address] & [address] [address]  Phone: [phone] Fax: [phone]

## 2018-08-15 RX ORDER — LATANOPROST 50 UG/ML
SOLUTION/ DROPS OPHTHALMIC
Qty: 2.5 ML | Refills: 12 | Status: SHIPPED | OUTPATIENT
Start: 2018-08-15 | End: 2019-07-19 | Stop reason: SDUPTHER

## 2018-08-17 RX ORDER — BRIMONIDINE TARTRATE, TIMOLOL MALEATE 2; 5 MG/ML; MG/ML
SOLUTION/ DROPS OPHTHALMIC
Qty: 10 ML | Refills: 12 | Status: SHIPPED | OUTPATIENT
Start: 2018-08-17 | End: 2019-09-16 | Stop reason: SDUPTHER

## 2018-08-27 ENCOUNTER — OFFICE VISIT (OUTPATIENT)
Dept: OPHTHALMOLOGY | Facility: CLINIC | Age: 63
End: 2018-08-27
Payer: MEDICARE

## 2018-08-27 DIAGNOSIS — H40.1231 LOW-TENSION GLAUCOMA OF BOTH EYES, MILD STAGE: ICD-10-CM

## 2018-08-27 DIAGNOSIS — H25.12 NUCLEAR SCLEROSIS OF LEFT EYE: ICD-10-CM

## 2018-08-27 DIAGNOSIS — Z98.890 POST-OPERATIVE STATE: Primary | ICD-10-CM

## 2018-08-27 PROCEDURE — 99024 POSTOP FOLLOW-UP VISIT: CPT | Mod: POP,,, | Performed by: OPHTHALMOLOGY

## 2018-08-27 PROCEDURE — 99212 OFFICE O/P EST SF 10 MIN: CPT | Mod: PBBFAC,PO | Performed by: OPHTHALMOLOGY

## 2018-08-27 PROCEDURE — 99999 PR PBB SHADOW E&M-EST. PATIENT-LVL II: CPT | Mod: PBBFAC,,, | Performed by: OPHTHALMOLOGY

## 2018-08-27 NOTE — PROGRESS NOTES
SUBJECTIVE:   Sophia Hope is a 63 y.o. female   Uncorrected distance visual acuity was 20/25 in the right eye and not recorded in the left eye.   Chief Complaint   Patient presents with    Post-op Evaluation     5 week phaco/istent OD per pt doing good not sure if OS is a problem         HPI:  HPI     Post-op Evaluation      Additional comments: 5 week phaco/istent OD per pt doing good not sure   if OS is a problem               Comments     1. Mild LTG No FHx (init 17/17=21/21 adjusted for thin K's)  Goal <14  CCT /  2. Cataract OS  PCIOL / I-Stent OD +20.5 SN60WF (distance) 7-25-18  3. Ptosis    AT's prn OU    Combigan BID OU  Xalatan QHS OU          Last edited by Arcelia Snyder MA on 8/27/2018  7:55 AM. (History)        Assessment /Plan :  1. Post-operative state   Exam:    Slit lamp exam:  L/L: nl  S/C: quiet and uninflamed  K: clear, wound sealed  AC: no cell or flare  Iris/Lens: IOL centered and stable      Assessment:    PO Month 1 S/P Phaco/IOL OD : Doing Well and completing healing process. Final visual correction options discussed and appropriate prescriptions and/or OTC reading glass recommendations offered to patient. Pt desires Reading Rx and to keep current glasses. Pt instructed to follow up with Dr. Scott in 6 months for next eye exam or PRN any pain, redness, vision changes or other concerns.     2. Doing well, IOP within acceptable range relative to target IOP and no evidence of progression. Continue current treatment. Reviewed importance of continued compliance with treatment and follow up.     3. Nuclear sclerosis of left eye will like to wait at this time            Return to clinic in 3 months  or as needed.  With IOP Check

## 2018-09-07 ENCOUNTER — OFFICE VISIT (OUTPATIENT)
Dept: FAMILY MEDICINE | Facility: CLINIC | Age: 63
End: 2018-09-07
Payer: MEDICARE

## 2018-09-07 VITALS
OXYGEN SATURATION: 98 % | HEART RATE: 77 BPM | BODY MASS INDEX: 30.72 KG/M2 | WEIGHT: 195.75 LBS | SYSTOLIC BLOOD PRESSURE: 140 MMHG | HEIGHT: 67 IN | TEMPERATURE: 98 F | DIASTOLIC BLOOD PRESSURE: 90 MMHG | RESPIRATION RATE: 17 BRPM

## 2018-09-07 DIAGNOSIS — E79.0 HYPERURICEMIA: ICD-10-CM

## 2018-09-07 DIAGNOSIS — M85.80 OSTEOPENIA, UNSPECIFIED LOCATION: Chronic | ICD-10-CM

## 2018-09-07 DIAGNOSIS — I10 ESSENTIAL HYPERTENSION: Chronic | ICD-10-CM

## 2018-09-07 DIAGNOSIS — H40.1231 LOW-TENSION GLAUCOMA OF BOTH EYES, MILD STAGE: ICD-10-CM

## 2018-09-07 DIAGNOSIS — E03.4 HYPOTHYROIDISM DUE TO ACQUIRED ATROPHY OF THYROID: Chronic | ICD-10-CM

## 2018-09-07 DIAGNOSIS — R00.2 PALPITATIONS: ICD-10-CM

## 2018-09-07 DIAGNOSIS — E66.9 OBESITY (BMI 30.0-34.9): ICD-10-CM

## 2018-09-07 PROCEDURE — 99214 OFFICE O/P EST MOD 30 MIN: CPT | Mod: S$PBB,,, | Performed by: FAMILY MEDICINE

## 2018-09-07 PROCEDURE — 99214 OFFICE O/P EST MOD 30 MIN: CPT | Mod: PBBFAC,PO | Performed by: FAMILY MEDICINE

## 2018-09-07 PROCEDURE — 99999 PR PBB SHADOW E&M-EST. PATIENT-LVL IV: CPT | Mod: PBBFAC,,, | Performed by: FAMILY MEDICINE

## 2018-09-07 RX ORDER — ATENOLOL 100 MG/1
100 TABLET ORAL DAILY
Qty: 90 TABLET | Refills: 1 | Status: SHIPPED | OUTPATIENT
Start: 2018-09-07 | End: 2019-03-14 | Stop reason: SDUPTHER

## 2018-09-07 RX ORDER — ATENOLOL 50 MG/1
50 TABLET ORAL DAILY
Qty: 90 TABLET | Refills: 1 | Status: SHIPPED | OUTPATIENT
Start: 2018-09-07 | End: 2019-03-14 | Stop reason: SDUPTHER

## 2018-09-07 NOTE — PROGRESS NOTES
Sophia Hope    Chief Complaint   Patient presents with    Hypertension    Follow-up       History of Present Illness:   Ms. Hope comes in today for 6-month hypertension follow-up.  She is not fasting but has taken medication today.  She states she has been monitoring her diet and exercises 5 times a week - 20 minutes each time by walking in the mall and moving around her house.     She reports no acute problems today.  She denies fever, chills, fatigue, appetite change; hot or cold intolerance; abdominal pain, nausea, vomiting, diarrhea, constipation; chest pain, palpitations, leg swelling; shortness of breath, cough, wheezing; unusual urinary symptoms; back pain; headaches; anxiety or depression, homicidal or suicidal thoughts.    She saw Dr. Isaiah Darling, cardiologist, on July 17, 2018 on palpitations due to PVCs/PACs, fleeting EATat which time he advised she increase Atenolol from 100 mg daily to 150 mg daily. However, on July 19, 2018 she told me she had not been taking Atenolol at all; I advised her to take Atenolol 100 mg daily. She reports no longer having palpitations.     She follows with Dr. Scott, ophthalmologist, for surveillance and treatment of glaucoma with last visit on August 27, 2018 and 3-month follow-up advised and scheduled for November 30, 2018.    Labs:              WBC                      4.61                03/06/2018                 HGB                      12.7                03/06/2018                 HCT                      39.9                03/06/2018                 PLT                      191                 03/06/2018                 CHOL                     168                 03/06/2018                 TRIG                     133                 03/06/2018                 HDL                      39 (L)              03/06/2018                 ALT                      16                  03/06/2018                 AST                      23                   03/06/2018                 NA                       141                 03/06/2018                 K                        4.2                 03/06/2018                 CL                       106                 03/06/2018                 CREATININE               1.0                 03/06/2018                 BUN                      17                  03/06/2018                 CO2                      27                  03/06/2018                 TSH                      0.837               03/06/2018                 HGBA1C                   5.2                 11/23/2015               LDLCALC                  102.4               03/06/2018                  Current Outpatient Medications   Medication Sig    allopurinol (ZYLOPRIM) 100 MG tablet take 1 tablet by mouth once daily    atenolol (TENORMIN) 100 MG tablet Take 1 tablet (100 mg total) by mouth once daily.    calcium carbonate-vitamin D2 600 mg calcium- 200 unit Cap Take 1 tablet by mouth Twice daily.    COMBIGAN 0.2-0.5 % Drop INSTILL 1 DROP INTO BOTH EYES TWICE A DAY    dextran 70-hypromellose (ARTIFICIAL TEARS) ophthalmic solution Place 1 drop into both eyes. BOTH EYES PRN     gatifloxacin 0.5 % Drop drops Apply 1 drop to eye 2 (two) times daily. Start one day before surgery    hydroCHLOROthiazide (MICROZIDE) 12.5 mg capsule take 1 capsule by mouth once daily    irbesartan (AVAPRO) 300 MG tablet TAKE 1 TABLET BY MOUTH ONCE DAILY    ketorolac 0.5% (ACULAR) 0.5 % Drop Place 1 drop into the right eye 4 (four) times daily. Eyedrops to start one day before surgery    latanoprost 0.005 % ophthalmic solution place 1 drop into both eyes at bedtime    levothyroxine (SYNTHROID) 100 MCG tablet take 1 tablet by mouth every morning before BREAKFAST    meloxicam (MOBIC) 15 MG tablet Take 1 tablet (15 mg total) by mouth once daily.    prednisoLONE acetate (PRED FORTE) 1 % DrpS Place 1 drop into the right eye 4 (four) times daily. Start one day before  surgery         Review of Systems   Constitutional: Negative for activity change, appetite change, chills, fatigue and fever.        Weight  87.8 kg (193 lb 9 oz) at June 29, 2018 visit.   Eyes:        See history of present illness examination.   Respiratory: Negative for cough, shortness of breath and wheezing.    Cardiovascular: Negative for chest pain, palpitations and leg swelling.         See history of present illness examination.    Gastrointestinal: Negative for abdominal pain, constipation, diarrhea, nausea and vomiting.   Endocrine: Negative for cold intolerance and heat intolerance.        See history of present illness examination.   Genitourinary: Negative for difficulty urinating.   Musculoskeletal: Negative for back pain.   Neurological: Negative for headaches.   Psychiatric/Behavioral: Negative for dysphoric mood and suicidal ideas. The patient is not nervous/anxious.         Negative for homicidal ideas.       Objective:  Physical Exam   Constitutional: She is oriented to person, place, and time. She appears well-developed and well-nourished. No distress.   Neck: Normal range of motion. Neck supple. No thyromegaly present.   Cardiovascular: Normal rate, regular rhythm, normal heart sounds and intact distal pulses.   No murmur heard.  Pulmonary/Chest: Effort normal and breath sounds normal. No respiratory distress. She has no wheezes.   Abdominal: Soft. Bowel sounds are normal. She exhibits no distension and no mass. There is no tenderness. There is no rebound and no guarding.   Musculoskeletal: Normal range of motion. She exhibits no edema.   She is ambulatory without problems.   Lymphadenopathy:     She has no cervical adenopathy.   Neurological: She is alert and oriented to person, place, and time.   Skin: She is not diaphoretic.   Psychiatric: She has a normal mood and affect. Her behavior is normal. Judgment and thought content normal.   Vitals reviewed.      ASSESSMENT:  1. Essential  hypertension    2. Palpitations    3. Hypothyroidism due to acquired atrophy of thyroid    4. Osteopenia, unspecified location    5. Hyperuricemia    6. Low-tension glaucoma of both eyes, mild stage    7. Obesity (BMI 30.0-34.9)        PLAN:  Sophia was seen today for hypertension and follow-up.    Diagnoses and all orders for this visit:    Essential hypertension  -     atenolol (TENORMIN) 100 MG tablet; Take 1 tablet (100 mg total) by mouth once daily.  -     atenolol (TENORMIN) 50 MG tablet; Take 1 tablet (50 mg total) by mouth once daily.    Palpitations  -     atenolol (TENORMIN) 100 MG tablet; Take 1 tablet (100 mg total) by mouth once daily.  -     atenolol (TENORMIN) 50 MG tablet; Take 1 tablet (50 mg total) by mouth once daily.    Hypothyroidism due to acquired atrophy of thyroid    Osteopenia, unspecified location    Hyperuricemia    Low-tension glaucoma of both eyes, mild stage    Obesity (BMI 30.0-34.9)      Patient advised to call for results.  Increase Atenolol from 100 mg daily to 150 mg daily for better blood pressure control. Prescription refill as noted above.  Continue current medications, follow low sodium, low cholesterol, low carb diet, daily walks.  Keep follow up with specialist.  Flu shot this fall.  Follow-up in about 6 months (around 3/6/2019) for physical.

## 2018-09-13 RX ORDER — IRBESARTAN 300 MG/1
300 TABLET ORAL DAILY
Qty: 30 TABLET | Refills: 5 | Status: SHIPPED | OUTPATIENT
Start: 2018-09-13 | End: 2019-01-15 | Stop reason: SDUPTHER

## 2018-11-02 RX ORDER — HYDROCHLOROTHIAZIDE 12.5 MG/1
CAPSULE ORAL
Qty: 30 CAPSULE | Refills: 5 | Status: SHIPPED | OUTPATIENT
Start: 2018-11-02 | End: 2019-05-08 | Stop reason: SDUPTHER

## 2018-11-05 RX ORDER — ALLOPURINOL 100 MG/1
TABLET ORAL
Qty: 30 TABLET | Refills: 5 | Status: SHIPPED | OUTPATIENT
Start: 2018-11-05 | End: 2019-05-10 | Stop reason: SDUPTHER

## 2018-11-06 ENCOUNTER — IMMUNIZATION (OUTPATIENT)
Dept: FAMILY MEDICINE | Facility: CLINIC | Age: 63
End: 2018-11-06
Payer: MEDICARE

## 2018-11-06 PROCEDURE — 90662 IIV NO PRSV INCREASED AG IM: CPT | Mod: PBBFAC,PO

## 2018-11-30 ENCOUNTER — OFFICE VISIT (OUTPATIENT)
Dept: OPHTHALMOLOGY | Facility: CLINIC | Age: 63
End: 2018-11-30
Payer: MEDICARE

## 2018-11-30 DIAGNOSIS — H40.1231 LOW-TENSION GLAUCOMA OF BOTH EYES, MILD STAGE: Primary | ICD-10-CM

## 2018-11-30 DIAGNOSIS — H25.12 NUCLEAR SCLEROSIS OF LEFT EYE: ICD-10-CM

## 2018-11-30 PROCEDURE — 92012 INTRM OPH EXAM EST PATIENT: CPT | Mod: S$PBB,,, | Performed by: OPHTHALMOLOGY

## 2018-11-30 PROCEDURE — 92136 OPHTHALMIC BIOMETRY: CPT | Mod: PBBFAC,PO | Performed by: OPHTHALMOLOGY

## 2018-11-30 PROCEDURE — 99999 PR PBB SHADOW E&M-EST. PATIENT-LVL II: CPT | Mod: PBBFAC,,, | Performed by: OPHTHALMOLOGY

## 2018-11-30 PROCEDURE — 99212 OFFICE O/P EST SF 10 MIN: CPT | Mod: PBBFAC,PO,25 | Performed by: OPHTHALMOLOGY

## 2018-11-30 RX ORDER — PREDNISOLONE ACETATE 10 MG/ML
1 SUSPENSION/ DROPS OPHTHALMIC 4 TIMES DAILY
Qty: 1 BOTTLE | Refills: 2 | Status: SHIPPED | OUTPATIENT
Start: 2018-11-30 | End: 2019-06-07

## 2018-11-30 RX ORDER — KETOROLAC TROMETHAMINE 5 MG/ML
1 SOLUTION OPHTHALMIC 4 TIMES DAILY
Qty: 1 BOTTLE | Refills: 2 | Status: SHIPPED | OUTPATIENT
Start: 2018-11-30 | End: 2019-06-07

## 2018-11-30 RX ORDER — GATIFLOXACIN 5 MG/ML
1 SOLUTION/ DROPS OPHTHALMIC 2 TIMES DAILY
Qty: 1 BOTTLE | Refills: 2 | Status: SHIPPED | OUTPATIENT
Start: 2018-11-30 | End: 2019-06-07

## 2018-11-30 NOTE — PROGRESS NOTES
SUBJECTIVE:   Sophia Hope is a 63 y.o. female   Uncorrected distance visual acuity was 20/20 -1 in the right eye and 20/40 +2 in the left eye.   Chief Complaint   Patient presents with    Glaucoma        HPI:  HPI     Pt here for 3m IOP chk. No pain or discomfort. VA stable. 100% compliant   with gtts.     1. Mild LTG No FHx (init 17/17=21/21 adjusted for thin K's)  Goal <14  CCT /  2. Cataract OS  PCIOL / I-Stent OD +20.5 SN60WF (distance) 7-25-18  3. Ptosis    AT's prn OU    Combigan BID OU  Xalatan QHS OU    Last edited by Delano Preciado, Patient Care Assistant on 11/30/2018  7:47   AM. (History)        Assessment /Plan :  1. Low-tension glaucoma of both eyes, mild stage Doing well, IOP within acceptable range relative to target IOP and no evidence of progression. Continue current treatment. Reviewed importance of continued compliance with treatment and follow up.     2. Nuclear sclerosis of left eye  Visually Significant Cataract OS:   Patient reports decreased vision consistent with the clinical amount of lenticular opacity,  which reaches the level of visual significance and affects activities of daily living such as  read, drive. Risks, benefits, and alternatives to cataract surgery were  discussed.  IOL options were discussed, including Premium IOL'S and the associated  side effects and additional patient cost associated with them as well as patient's visual  goals. The pt expressed a desire to proceed with surgery with the potential for some  reasonable degree of visual improvement and was consented.  Risks of loss of vision and eye reviewed as well as possibility of need for spectacle correction after surgery even with premium implants. Verbal and written preop  instructions were provided to the patient.       Pt is interested in traditional monofocal IOL aiming for:    Distance OU and understands that glasses will be generally needed at all times for near vision and often for finer  distance correction especially for higher degrees of astigmatism.       Final visual acuity may be limited by astigmatism and glaucoma damage    Pt wishes to have Phaco/IOL  OS     Requests a Monofocal IOL. +Istent inject    Will aim for distance    Other considerations: None

## 2018-12-19 ENCOUNTER — OUTSIDE PLACE OF SERVICE (OUTPATIENT)
Dept: ADMINISTRATIVE | Facility: OTHER | Age: 63
End: 2018-12-19
Payer: MEDICARE

## 2018-12-19 PROCEDURE — 66984 XCAPSL CTRC RMVL W/O ECP: CPT | Mod: LT,,, | Performed by: OPHTHALMOLOGY

## 2018-12-19 PROCEDURE — 0191T PR INSERT ANT SEGMENT DRAIN WO RESERVOIR, INTERNAL APPR, INTO TRABECULAR: CPT | Mod: 51,LT,, | Performed by: OPHTHALMOLOGY

## 2018-12-20 ENCOUNTER — OFFICE VISIT (OUTPATIENT)
Dept: OPHTHALMOLOGY | Facility: CLINIC | Age: 63
End: 2018-12-20
Payer: MEDICARE

## 2018-12-20 DIAGNOSIS — Z98.890 POST-OPERATIVE STATE: Primary | ICD-10-CM

## 2018-12-20 PROCEDURE — 99999 PR PBB SHADOW E&M-EST. PATIENT-LVL II: CPT | Mod: PBBFAC,,, | Performed by: OPHTHALMOLOGY

## 2018-12-20 PROCEDURE — 99212 OFFICE O/P EST SF 10 MIN: CPT | Mod: PBBFAC,PO | Performed by: OPHTHALMOLOGY

## 2018-12-20 PROCEDURE — 99024 POSTOP FOLLOW-UP VISIT: CPT | Mod: POP,,, | Performed by: OPHTHALMOLOGY

## 2018-12-20 NOTE — PROGRESS NOTES
SUBJECTIVE:   Sophia Hope is a 63 y.o. female   Uncorrected distance visual acuity was not recorded in the right eye and 20/30 in the left eye.   Chief Complaint   Patient presents with    Post-op Evaluation     1 day s/p phaco/istent inject OS. doing well         HPI:  HPI     Post-op Evaluation      Additional comments: 1 day s/p phaco/istent inject OS. doing well               Comments     1. Mild LTG No FHx (init 17/17=21/21 adjusted for thin K's)  Goal <14  CCT /  2. PCIOL OS/ istent inject 12/19/2018  PCIOL / I-Stent OD +20.5 SN60WF (distance) 7-25-18  3. Ptosis    AT's prn OU      OS: gat bid, pred qid, ket qid   Combigan BID OU  Xalatan QHS OU          Last edited by Nora Do MA on 12/20/2018  8:02 AM. (History)        Assessment /Plan :  1. Post-operative state Exam:  SLE:  S/C: normal  K : trace k fold  AC: trace cell and flare  Iris: normal  Lens: PCIOL    IMP:  PO Day 1 S/P Phaco/IOL OS with I stent inject x 2 : Doing well.    Plan:  Continue gtts to operative eye:  Pred 1% QID  Ketorolac QID  Zymaxid BID  Reinstructed in importance of absolute compliance with Post-OP instructions including medications, shield at bedtime, and limitation of activities. Follow up appointments in approximately one and six weeks or call immediately for increased pain, redness or vision loss.   Use Combigan qam OU and Xalatan qhs OU

## 2018-12-24 ENCOUNTER — OFFICE VISIT (OUTPATIENT)
Dept: OPHTHALMOLOGY | Facility: CLINIC | Age: 63
End: 2018-12-24
Payer: MEDICARE

## 2018-12-24 DIAGNOSIS — H40.1231 LOW-TENSION GLAUCOMA OF BOTH EYES, MILD STAGE: ICD-10-CM

## 2018-12-24 DIAGNOSIS — Z98.890 POST-OPERATIVE STATE: Primary | ICD-10-CM

## 2018-12-24 PROCEDURE — 99024 POSTOP FOLLOW-UP VISIT: CPT | Mod: POP,,, | Performed by: OPHTHALMOLOGY

## 2018-12-24 PROCEDURE — 99212 OFFICE O/P EST SF 10 MIN: CPT | Mod: PBBFAC,PO | Performed by: OPHTHALMOLOGY

## 2018-12-24 PROCEDURE — 99999 PR PBB SHADOW E&M-EST. PATIENT-LVL II: CPT | Mod: PBBFAC,,, | Performed by: OPHTHALMOLOGY

## 2018-12-24 NOTE — PROGRESS NOTES
SUBJECTIVE:   Sophia Hope is a 63 y.o. female   Uncorrected distance visual acuity was not recorded in the right eye and 20/30 in the left eye.   Chief Complaint   Patient presents with    Post-op Evaluation     1 week PCIOL/ISTENT(inj) OS        HPI:  HPI     Post-op Evaluation      Additional comments: 1 week PCIOL/ISTENT(inj) OS              Comments     The patient states her left eye is doing well and she denies any ocular   pain at this time.  100% drop compliance    1. Mild LTG No FHx (init 17/17=21/21 adjusted for thin K's)  Goal <14  CCT /  2. PCIOL I-Stent (inject) OS +20.5 SN60WF (distance)12/19/2018  PCIOL / I-Stent OD +20.5 SN60WF (distance) 7-25-18  3. Ptosis    AT's prn OU    OS: gat bid, pred qid, ket qid   Combigan QAM OU  Xalatan QHS OU          Last edited by Latoya Albright on 12/24/2018  8:17 AM. (History)        Assessment /Plan :  1. Post-operative state Slit lamp exam:  L/L: nl  K: clear, wound sealed  AC: trace cell and flare  Iris/Lens: IOL centered and stable      IMP:  PO Week 1 S/P Phaco/ IOL OS /Istent: Doing well with no evidence of infection or abnormal inflammation.     Plan:  Pt given and instructed in one week PO instructions. D/C zymaxid and start to taper off Ketorlac and Pred Forte 1% weekly. Can resume normal activitites and d/c eye shield. OTC reading glasses can be used until evaluated for final MR. Follow up in one month or PRN pain, redness, vision loss, or other concerns.     2. Low-tension glaucoma of both eyes, mild stage  D/C Combigan OS Only cont Latanoprost qhs OU            RTC 1 month

## 2019-01-14 RX ORDER — LEVOTHYROXINE SODIUM 100 UG/1
TABLET ORAL
Qty: 30 TABLET | Refills: 1 | Status: SHIPPED | OUTPATIENT
Start: 2019-01-14 | End: 2019-03-14 | Stop reason: SDUPTHER

## 2019-01-15 RX ORDER — IRBESARTAN 300 MG/1
300 TABLET ORAL DAILY
Qty: 30 TABLET | Refills: 5 | Status: SHIPPED | OUTPATIENT
Start: 2019-01-15 | End: 2019-03-19 | Stop reason: SDUPTHER

## 2019-01-31 ENCOUNTER — OFFICE VISIT (OUTPATIENT)
Dept: OPHTHALMOLOGY | Facility: CLINIC | Age: 64
End: 2019-01-31
Payer: MEDICARE

## 2019-01-31 DIAGNOSIS — Z98.890 POST-OPERATIVE STATE: Primary | ICD-10-CM

## 2019-01-31 PROCEDURE — 99024 PR POST-OP FOLLOW-UP VISIT: ICD-10-PCS | Mod: POP,,, | Performed by: OPHTHALMOLOGY

## 2019-01-31 PROCEDURE — 99024 POSTOP FOLLOW-UP VISIT: CPT | Mod: POP,,, | Performed by: OPHTHALMOLOGY

## 2019-01-31 PROCEDURE — 99999 PR PBB SHADOW E&M-EST. PATIENT-LVL II: CPT | Mod: PBBFAC,,, | Performed by: OPHTHALMOLOGY

## 2019-01-31 PROCEDURE — 99999 PR PBB SHADOW E&M-EST. PATIENT-LVL II: ICD-10-PCS | Mod: PBBFAC,,, | Performed by: OPHTHALMOLOGY

## 2019-01-31 PROCEDURE — 99212 OFFICE O/P EST SF 10 MIN: CPT | Mod: PBBFAC,PN | Performed by: OPHTHALMOLOGY

## 2019-01-31 NOTE — PROGRESS NOTES
SUBJECTIVE:   Sophia Hope is a 63 y.o. female   Uncorrected distance visual acuity was not recorded in the right eye and 20/30 +2 in the left eye.   Chief Complaint   Patient presents with    Post-op Evaluation     PCIOL/ISTENT(INJ) OS 12/19/18        HPI:  HPI     Post-op Evaluation      Additional comments: PCIOL/ISTENT(INJ) OS 12/19/18              Comments     The patient states her left eye is doing well but she does get an   awareness feeling in it at times.  100% drop compliance    1. Mild LTG No FHx (init 17/17=21/21 adjusted for thin K's)  Goal <14  CCT /  2. PCIOL I-Stent (inject) OS +20.5 SN60WF (distance)12/19/2018  PCIOL / I-Stent OD +20.5 SN60WF (distance) 7-25-18  3. Ptosis    AT's prn OU    Combigan QAM OD  Xalatan QHS OU          Last edited by Latoya Albright on 1/31/2019  7:56 AM. (History)        Assessment /Plan :  1. Post-operative state   Exam:    Slit lamp exam:  L/L: nl  S/C: quiet and uninflamed  K: clear, wound sealed  AC: no cell or flare  Iris/Lens: IOL centered and stable      Assessment:    PO Month 1 S/P Phaco/IOL OS with I stent inject x 2: Doing Well and completing healing process. Final visual correction options discussed and appropriate prescriptions and/or OTC reading glass recommendations offered to patient. Pt desires OTC Readers. Pt instructed to follow up with Dr. Scott in 4 months for IOP check and GOCT or PRN any pain, redness, vision changes or other concerns.  Continue Combigan qam OD and Xalatan qhs OU  Add artificial tears prn OU

## 2019-03-07 ENCOUNTER — OFFICE VISIT (OUTPATIENT)
Dept: FAMILY MEDICINE | Facility: CLINIC | Age: 64
End: 2019-03-07
Payer: MEDICARE

## 2019-03-07 ENCOUNTER — LAB VISIT (OUTPATIENT)
Dept: LAB | Facility: HOSPITAL | Age: 64
End: 2019-03-07
Attending: FAMILY MEDICINE
Payer: MEDICARE

## 2019-03-07 VITALS
SYSTOLIC BLOOD PRESSURE: 104 MMHG | HEIGHT: 67 IN | TEMPERATURE: 97 F | WEIGHT: 202.25 LBS | RESPIRATION RATE: 17 BRPM | DIASTOLIC BLOOD PRESSURE: 76 MMHG | BODY MASS INDEX: 31.74 KG/M2 | HEART RATE: 80 BPM | OXYGEN SATURATION: 99 %

## 2019-03-07 DIAGNOSIS — E66.9 OBESITY (BMI 30.0-34.9): ICD-10-CM

## 2019-03-07 DIAGNOSIS — E79.0 HYPERURICEMIA: ICD-10-CM

## 2019-03-07 DIAGNOSIS — K63.5 BENIGN COLON POLYP: ICD-10-CM

## 2019-03-07 DIAGNOSIS — Z12.11 COLON CANCER SCREENING: ICD-10-CM

## 2019-03-07 DIAGNOSIS — Z78.0 POSTMENOPAUSAL: ICD-10-CM

## 2019-03-07 DIAGNOSIS — I10 ESSENTIAL HYPERTENSION: Chronic | ICD-10-CM

## 2019-03-07 DIAGNOSIS — M85.80 OSTEOPENIA, UNSPECIFIED LOCATION: Chronic | ICD-10-CM

## 2019-03-07 DIAGNOSIS — Z12.31 VISIT FOR SCREENING MAMMOGRAM: ICD-10-CM

## 2019-03-07 DIAGNOSIS — Z23 NEED FOR PROPHYLACTIC VACCINATION AGAINST STREPTOCOCCUS PNEUMONIAE (PNEUMOCOCCUS): ICD-10-CM

## 2019-03-07 DIAGNOSIS — E03.4 HYPOTHYROIDISM DUE TO ACQUIRED ATROPHY OF THYROID: Chronic | ICD-10-CM

## 2019-03-07 DIAGNOSIS — H40.1231 LOW-TENSION GLAUCOMA OF BOTH EYES, MILD STAGE: ICD-10-CM

## 2019-03-07 DIAGNOSIS — I10 ESSENTIAL HYPERTENSION: Primary | Chronic | ICD-10-CM

## 2019-03-07 LAB
ALBUMIN SERPL BCP-MCNC: 3.3 G/DL
ALP SERPL-CCNC: 71 U/L
ALT SERPL W/O P-5'-P-CCNC: 15 U/L
ANION GAP SERPL CALC-SCNC: 8 MMOL/L
AST SERPL-CCNC: 22 U/L
BASOPHILS # BLD AUTO: 0.04 K/UL
BASOPHILS NFR BLD: 0.8 %
BILIRUB SERPL-MCNC: 0.8 MG/DL
BILIRUB UR QL STRIP: NEGATIVE
BUN SERPL-MCNC: 16 MG/DL
CALCIUM SERPL-MCNC: 9.6 MG/DL
CHLORIDE SERPL-SCNC: 104 MMOL/L
CHOLEST SERPL-MCNC: 177 MG/DL
CHOLEST/HDLC SERPL: 4.9 {RATIO}
CLARITY UR REFRACT.AUTO: ABNORMAL
CO2 SERPL-SCNC: 27 MMOL/L
COLOR UR AUTO: YELLOW
CREAT SERPL-MCNC: 1 MG/DL
CTP QC/QA: YES
DIFFERENTIAL METHOD: ABNORMAL
EOSINOPHIL # BLD AUTO: 0.1 K/UL
EOSINOPHIL NFR BLD: 2.8 %
ERYTHROCYTE [DISTWIDTH] IN BLOOD BY AUTOMATED COUNT: 13.2 %
EST. GFR  (AFRICAN AMERICAN): >60 ML/MIN/1.73 M^2
EST. GFR  (NON AFRICAN AMERICAN): 59.3 ML/MIN/1.73 M^2
FECAL OCCULT BLOOD, POC: NEGATIVE
GLUCOSE SERPL-MCNC: 91 MG/DL
GLUCOSE UR QL STRIP: NEGATIVE
HCT VFR BLD AUTO: 40.4 %
HDLC SERPL-MCNC: 36 MG/DL
HDLC SERPL: 20.3 %
HGB BLD-MCNC: 12.7 G/DL
HGB UR QL STRIP: NEGATIVE
IMM GRANULOCYTES # BLD AUTO: 0.01 K/UL
IMM GRANULOCYTES NFR BLD AUTO: 0.2 %
KETONES UR QL STRIP: NEGATIVE
LDLC SERPL CALC-MCNC: 115.8 MG/DL
LEUKOCYTE ESTERASE UR QL STRIP: NEGATIVE
LYMPHOCYTES # BLD AUTO: 1.7 K/UL
LYMPHOCYTES NFR BLD: 33.1 %
MCH RBC QN AUTO: 32.3 PG
MCHC RBC AUTO-ENTMCNC: 31.4 G/DL
MCV RBC AUTO: 103 FL
MONOCYTES # BLD AUTO: 0.5 K/UL
MONOCYTES NFR BLD: 10.2 %
NEUTROPHILS # BLD AUTO: 2.7 K/UL
NEUTROPHILS NFR BLD: 52.9 %
NITRITE UR QL STRIP: NEGATIVE
NONHDLC SERPL-MCNC: 141 MG/DL
NRBC BLD-RTO: 0 /100 WBC
PH UR STRIP: 6 [PH] (ref 5–8)
PLATELET # BLD AUTO: 191 K/UL
PMV BLD AUTO: 11.1 FL
POTASSIUM SERPL-SCNC: 3.9 MMOL/L
PROT SERPL-MCNC: 6.8 G/DL
PROT UR QL STRIP: NEGATIVE
RBC # BLD AUTO: 3.93 M/UL
SODIUM SERPL-SCNC: 139 MMOL/L
SP GR UR STRIP: 1.01 (ref 1–1.03)
T4 FREE SERPL-MCNC: 1.35 NG/DL
TRIGL SERPL-MCNC: 126 MG/DL
TSH SERPL DL<=0.005 MIU/L-ACNC: 0.34 UIU/ML
URATE SERPL-MCNC: 5.3 MG/DL
URN SPEC COLLECT METH UR: ABNORMAL
WBC # BLD AUTO: 5.08 K/UL

## 2019-03-07 PROCEDURE — 80061 LIPID PANEL: CPT

## 2019-03-07 PROCEDURE — 84443 ASSAY THYROID STIM HORMONE: CPT

## 2019-03-07 PROCEDURE — 84550 ASSAY OF BLOOD/URIC ACID: CPT

## 2019-03-07 PROCEDURE — 99999 PR PBB SHADOW E&M-EST. PATIENT-LVL V: ICD-10-PCS | Mod: PBBFAC,,, | Performed by: FAMILY MEDICINE

## 2019-03-07 PROCEDURE — G0101 CA SCREEN;PELVIC/BREAST EXAM: HCPCS | Mod: S$PBB,ICN,, | Performed by: FAMILY MEDICINE

## 2019-03-07 PROCEDURE — 80053 COMPREHEN METABOLIC PANEL: CPT

## 2019-03-07 PROCEDURE — 99214 PR OFFICE/OUTPT VISIT, EST, LEVL IV, 30-39 MIN: ICD-10-PCS | Mod: 25,S$PBB,ICN, | Performed by: FAMILY MEDICINE

## 2019-03-07 PROCEDURE — 81003 URINALYSIS AUTO W/O SCOPE: CPT

## 2019-03-07 PROCEDURE — 36415 COLL VENOUS BLD VENIPUNCTURE: CPT | Mod: PO

## 2019-03-07 PROCEDURE — 99214 OFFICE O/P EST MOD 30 MIN: CPT | Mod: 25,S$PBB,ICN, | Performed by: FAMILY MEDICINE

## 2019-03-07 PROCEDURE — 82270 OCCULT BLOOD FECES: CPT | Mod: PBBFAC,PO | Performed by: FAMILY MEDICINE

## 2019-03-07 PROCEDURE — 99215 OFFICE O/P EST HI 40 MIN: CPT | Mod: PBBFAC,PO,25 | Performed by: FAMILY MEDICINE

## 2019-03-07 PROCEDURE — 85025 COMPLETE CBC W/AUTO DIFF WBC: CPT

## 2019-03-07 PROCEDURE — G0009 ADMIN PNEUMOCOCCAL VACCINE: HCPCS | Mod: PBBFAC,PO

## 2019-03-07 PROCEDURE — 84439 ASSAY OF FREE THYROXINE: CPT

## 2019-03-07 PROCEDURE — G0101 CA SCREEN;PELVIC/BREAST EXAM: HCPCS | Mod: 51,PBBFAC,PO | Performed by: FAMILY MEDICINE

## 2019-03-07 PROCEDURE — G0101 PR CA SCREEN;PELVIC/BREAST EXAM: ICD-10-PCS | Mod: S$PBB,ICN,, | Performed by: FAMILY MEDICINE

## 2019-03-07 PROCEDURE — 99999 PR PBB SHADOW E&M-EST. PATIENT-LVL V: CPT | Mod: PBBFAC,,, | Performed by: FAMILY MEDICINE

## 2019-03-07 NOTE — PROGRESS NOTES
HISTORY OF PRESENT ILLNESS: Ms. Hope comes in today fasting and with taking medication without acute problems for annual wellness examination and for follow up of chronic medical problems.  She reports no acute problems today.    END OF LIFE DECISION: She has no living will and does not desire life support.    Current Outpatient Medications   Medication Sig    allopurinol (ZYLOPRIM) 100 MG tablet TAKE 1 TABLET BY MOUTH ONCE DAILY    atenolol (TENORMIN) 100 MG tablet Take 1 tablet (100 mg total) by mouth once daily.    atenolol (TENORMIN) 50 MG tablet Take 1 tablet (50 mg total) by mouth once daily.    calcium carbonate-vitamin D2 600 mg calcium- 200 unit Cap Take 1 tablet by mouth Twice daily.    COMBIGAN 0.2-0.5 % Drop INSTILL 1 DROP INTO BOTH EYES TWICE A DAY    dextran 70-hypromellose (ARTIFICIAL TEARS) ophthalmic solution Place 1 drop into both eyes. BOTH EYES PRN     gatifloxacin 0.5 % Drop drops Apply 1 drop to eye 2 (two) times daily. Eyedrops to start one day before surgery    hydroCHLOROthiazide (MICROZIDE) 12.5 mg capsule TAKE 1 CAPSULE BY MOUTH ONCE DAILY    irbesartan (AVAPRO) 300 MG tablet Take 1 tablet (300 mg total) by mouth once daily.    ketorolac 0.5% (ACULAR) 0.5 % Drop Place 1 drop into the left eye 4 (four) times daily. Eyedrops to start one day before surgery    latanoprost 0.005 % ophthalmic solution place 1 drop into both eyes at bedtime    levothyroxine (SYNTHROID) 100 MCG tablet TAKE 1 TABLET BY MOUTH EVERY MORNING BEFORE MEALS    meloxicam (MOBIC) 15 MG tablet Take 1 tablet (15 mg total) by mouth once daily.    prednisoLONE acetate (PRED FORTE) 1 % DrpS Place 1 drop into the left eye 4 (four) times daily. Eyedrops to start one day before surgery     SCREENINGS:   Cholesterol: March 6, 2018.  FFS/Colonoscopy: April 14, 2014 - benign colon polyp, diverticulosis; repeat in 5 years.  Mammogram: April 3, 2018 - okay.  GYN Exam: March 6, 2018 - okay. Pap smears 2014 - 2018 -  okay. Pap smear no longer needed.  Dexa Scan: March 9, 2015 - osteopenia; repeat in 5 years.  Eye Exam: January 31, 2019 with Dr. CATRACHO Scott and every 4 months for glaucoma surveillance and scheduled for May 31, 2019.  PPD: Never.  Immunizations: Td/Tdap - March 6, 2017.  Gardasil - N./A.  Zostavax - February 17, 2014.  Pneumovax - Never. She desires. Due on/after March 7, 2020.  Prevnar-13 shot - Never. She desires.   Seasonal Flu - November 6, 2018.    ROS:  GENERAL: Denies fever, chills, fatigue or unusual weight change. Appetite normal. Weight 88.8 kg (195 lb 12.3 oz) at September 7, 2018 visit. Monitors diet some times. Exercises at [facility] 3 to 4 times per week - 45 to 60 minutes each time. .  SKIN: Denies rashes, itching, changes in mole, color or texture of skin or easy bruising.  HEAD: Denies headaches or recent head trauma.  EYES: Denies change in vision, pain, diplopia, redness or discharge. She wears glasses.  EARS: Denies ear pain, discharge, vertigo or decreased hearing.  NOSE: Denies loss of smell, epistaxis or rhinitis.  MOUTH & THROAT: Denies hoarseness or change in voice. Denies excessive gum bleeding or mouth sores. Denies sore throat.  NODES: Denies swollen glands.  CHEST: Denies LUCERO, wheezing, cough, or sputum production.   CARDIOVASCULAR: Denies chest pain, PND, orthopnea or reduced exercise tolerance. Denies palpitations.   ABDOMEN: Denies diarrhea, constipation, nausea, vomiting, abdominal pain, or blood in stool.  URINARY: Denies flank pain, dysuria or hematuria.  GENITOURINARY: Denies flank pain, dysuria, frequency or hematuria. She does not perform monthly breast self exams.  ENDOCRINE: Denies diabetes or cholesterol problems.  HEME/LYMPH: Denies bleeding problems.  PERIPHERAL VASCULAR:Denies claudication or cyanosis.  MUSCULOSKELETAL: Denies joint stiffness, pain or swelling. Denies edema.  NEUROLOGIC: Denies history of seizures, tremors, paralysis, alteration of gait or  "coordination.  PSYCHIATRIC: Denies mood swings, depression, anxiety, homicidal or suicidal thoughts. Denies sleep problems.    PE:   VS: /76 (BP Location: Right arm, Patient Position: Sitting, BP Method: Medium (Manual))   Pulse 80   Temp 97.3 °F (36.3 °C) (Oral)   Resp 17   Ht 5' 6.5" (1.689 m)   Wt 91.7 kg (202 lb 4.4 oz)   SpO2 99%   BMI 32.16 kg/m²   APPEARANCE: Well nourished, well developed female, pleasant and obese, alert and oriented in no acute distress.   HEAD: Non tender. Full range of motion.  EYES: PERRL, conjunctiva pink, lids no edema.  EARS: External canal patent, no swelling or redness. TM's shiny and clear.  NOSE: Mucosa and turbinates pink, not swollen. No discharge. Non tender sinuses.  THROAT: No pharyngeal erythema or exudate. No stridor.   NECK: Supple, no mass, thyroid not enlarged.  NODES: No cervical, axillary or inguinal lymph node enlargement.  CHEST: Normal respiratory effort. Lungs clear to auscultation.  CARDIOVASCULAR: Normal S1, S2. No rubs, murmurs or gallops. PMI not displaced. No carotid bruit. Pedal pulses palpable bilaterally. No edema.  ABDOMEN: Bowel sounds present. Not distended. Soft. No tenderness, masses or organomegaly.  BREAST EXAM: Symmetrical, no external lesions, no discharge, no masses palpated.  PELVIC EXAM: No external lesions noted, no discharge, cervix appears normal, bimanual exam showed no uterine abnormalities and no adnexal masses or tenderness. Urethra and bladder intact.  RECTAL EXAM: No external hemorrhoids or anal fissures. Heme-negative stool in the rectal vault.  MUSCULOSKELETAL: No joint deformities or stiffness. She is ambulatory without problems.  SKIN: No rashes or suspicious lesions, normal color and turgor.  NEUROLOGIC: Cranial Nerves: II-XII grossly intact. DTR's: Knees, Ankles 2+ and equal bilaterally. Gait & Posture: Normal gait and fine motion.  PSYCHIATRIC: Patient alert, oriented x 3. Mood/Affect normal without acute anxiety or " depression noted. Judgment/insight good as she makes appropriate decisions during today's examination.     ASSESSMENT:    ICD-10-CM ICD-9-CM    1. Essential hypertension I10 401.9 CBC auto differential      TSH      Urinalysis      Comprehensive metabolic panel      Lipid panel   2. Hypothyroidism due to acquired atrophy of thyroid E03.4 244.8 TSH     246.8    3. Hyperuricemia E79.0 790.6 Uric acid   4. Osteopenia, unspecified location M85.80 733.90    5. Low-tension glaucoma of both eyes, mild stage H40.1231 365.12      365.71    6. Benign colon polyp K63.5 211.3 Case request GI: COLONOSCOPY   7. Obesity (BMI 30.0-34.9) E66.9 278.00    8. Postmenopausal Z78.0 V49.81    9. Colon cancer screening Z12.11 V76.51 POCT Occult Blood Stool   10. Visit for screening mammogram Z12.31 V76.12 Mammo Digital Screening Bilateral With CAD   11. Need for prophylactic vaccination against Streptococcus pneumoniae (pneumococcus) Z23 V03.82 Pneumococcal Conjugate Vaccine (13 Valent) (IM)     PLAN:  1. Age-appropriate counseling-appropriate low-sodium, low-cholesterol, low carbohydrate diet and exercise daily, monthly breast self exam, annual wellness examination.   2. Patient advised to call for results.  3. Continue current medications.  4. Keep follow up with specialists.  5. Follow-up in about 6 months (around 9/6/2019) for hypertension follow up.     Answers for HPI/ROS submitted by the patient on 3/5/2019   activity change: No  unexpected weight change: No  neck pain: No  hearing loss: No  rhinorrhea: No  trouble swallowing: No  eye discharge: No  visual disturbance: No  chest tightness: No  wheezing: No  chest pain: No  palpitations: No  blood in stool: No  constipation: No  vomiting: No  diarrhea: No  polydipsia: No  polyuria: No  difficulty urinating: No  hematuria: No  menstrual problem: No  dysuria: No  joint swelling: No  arthralgias: No  headaches: No  weakness: No  confusion: No  dysphoric mood: No

## 2019-03-14 DIAGNOSIS — I10 ESSENTIAL HYPERTENSION: Chronic | ICD-10-CM

## 2019-03-14 DIAGNOSIS — R00.2 PALPITATIONS: ICD-10-CM

## 2019-03-14 RX ORDER — LEVOTHYROXINE SODIUM 100 UG/1
TABLET ORAL
Qty: 30 TABLET | Refills: 5 | Status: SHIPPED | OUTPATIENT
Start: 2019-03-14 | End: 2019-08-29 | Stop reason: SDUPTHER

## 2019-03-14 RX ORDER — ATENOLOL 100 MG/1
TABLET ORAL
Qty: 90 TABLET | Refills: 1 | Status: SHIPPED | OUTPATIENT
Start: 2019-03-14 | End: 2019-09-09

## 2019-03-14 RX ORDER — ATENOLOL 50 MG/1
TABLET ORAL
Qty: 90 TABLET | Refills: 1 | Status: SHIPPED | OUTPATIENT
Start: 2019-03-14 | End: 2019-03-20 | Stop reason: SDUPTHER

## 2019-03-19 DIAGNOSIS — I10 ESSENTIAL HYPERTENSION: Chronic | ICD-10-CM

## 2019-03-19 DIAGNOSIS — R00.2 PALPITATIONS: ICD-10-CM

## 2019-03-19 RX ORDER — IRBESARTAN 300 MG/1
300 TABLET ORAL DAILY
Qty: 30 TABLET | Refills: 5 | Status: SHIPPED | OUTPATIENT
Start: 2019-03-19 | End: 2019-10-18 | Stop reason: SDUPTHER

## 2019-03-19 NOTE — TELEPHONE ENCOUNTER
----- Message from Alex Alston sent at 3/19/2019  1:40 PM CDT -----  Contact: Pt  Type:  Needs Medical Advice    Who Called: Pt  Symptoms (please be specific): n/a  How long has patient had these symptoms:  Pharmacy name and phone #:  [facility] on [address]   Would the patient rather a call back or a response via [facility]? Call back  Best Call Back Number: [phone]  Additional Information: The pt is stating that the approval for  both of her blood pressure medications have not been  sent over to the pharmacy, please advise as soon as possible.

## 2019-03-20 DIAGNOSIS — I10 ESSENTIAL HYPERTENSION: Chronic | ICD-10-CM

## 2019-03-20 DIAGNOSIS — R00.2 PALPITATIONS: ICD-10-CM

## 2019-03-20 RX ORDER — ATENOLOL 50 MG/1
TABLET ORAL
Qty: 90 TABLET | Refills: 0 | OUTPATIENT
Start: 2019-03-20

## 2019-03-20 NOTE — TELEPHONE ENCOUNTER
----- Message from Petty Cabezas sent at 3/20/2019  8:48 AM CDT -----  Contact: self  Patient requesting a call back regarding prescription for atenolol (TENORMIN) 50 MG tablet. She states the pharmacy is saying they don't have it. Please call back at [phone]. If she is not able to answer, please send message through [facility].      Thanks,  Petty Cabezas

## 2019-03-20 NOTE — TELEPHONE ENCOUNTER
----- Message from Ismael Draper sent at 3/20/2019  8:16 AM CDT -----  Contact: self  Type:  RX Refill Request    Who Called: pt  Refill or New Rx:refill  RX Name and Strength:atenol-100mg/ atenol-50mg  How is the patient currently taking it? (ex. 1XDay):1xday/1xday  Is this a 30 day or 90 day RX:90/90  Preferred Pharmacy with phone number:  [facility] [id number] [address] [address]  Phone: [phone] Fax: [phone]  Local or Mail Order:local  Ordering Provider:dr. espinal  Would the patient rather a call back or a response via [facility]? [facility]  Best Call Back Number:[phone]  Additional Information: pt called on yesterday as well.    Thanks,  Ismael Draper

## 2019-03-21 RX ORDER — ATENOLOL 50 MG/1
TABLET ORAL
Qty: 90 TABLET | Refills: 1 | Status: SHIPPED | OUTPATIENT
Start: 2019-03-21 | End: 2019-09-17 | Stop reason: SDUPTHER

## 2019-04-04 ENCOUNTER — HOSPITAL ENCOUNTER (OUTPATIENT)
Dept: RADIOLOGY | Facility: HOSPITAL | Age: 64
Discharge: HOME | End: 2019-04-04
Attending: FAMILY MEDICINE
Payer: MEDICARE

## 2019-04-04 VITALS — HEIGHT: 67 IN | BODY MASS INDEX: 31.71 KG/M2 | WEIGHT: 202 LBS

## 2019-04-04 DIAGNOSIS — Z12.31 VISIT FOR SCREENING MAMMOGRAM: ICD-10-CM

## 2019-04-04 PROCEDURE — 77063 BREAST TOMOSYNTHESIS BI: CPT | Mod: 26,,, | Performed by: RADIOLOGY

## 2019-04-04 PROCEDURE — 77063 MAMMO DIGITAL SCREENING BILAT WITH TOMOSYNTHESIS_CAD: ICD-10-PCS | Mod: 26,,, | Performed by: RADIOLOGY

## 2019-04-04 PROCEDURE — 77067 MAMMO DIGITAL SCREENING BILAT WITH TOMOSYNTHESIS_CAD: ICD-10-PCS | Mod: 26,,, | Performed by: RADIOLOGY

## 2019-04-04 PROCEDURE — 77067 SCR MAMMO BI INCL CAD: CPT | Mod: 26,,, | Performed by: RADIOLOGY

## 2019-04-04 PROCEDURE — 77067 SCR MAMMO BI INCL CAD: CPT | Mod: TC

## 2019-04-05 DIAGNOSIS — R92.8 ABNORMAL MAMMOGRAM: Primary | ICD-10-CM

## 2019-04-08 ENCOUNTER — HOSPITAL ENCOUNTER (OUTPATIENT)
Dept: RADIOLOGY | Facility: HOSPITAL | Age: 64
Discharge: HOME | End: 2019-04-08
Attending: FAMILY MEDICINE
Payer: MEDICARE

## 2019-04-08 DIAGNOSIS — R92.8 ABNORMAL MAMMOGRAM: ICD-10-CM

## 2019-04-08 PROCEDURE — 77061 BREAST TOMOSYNTHESIS UNI: CPT | Mod: 26,,, | Performed by: RADIOLOGY

## 2019-04-08 PROCEDURE — 77061 BREAST TOMOSYNTHESIS UNI: CPT | Mod: TC

## 2019-04-08 PROCEDURE — 77061 MAMMO DIGITAL DIAGNOSTIC RIGHT WITH TOMOSYNTHESIS_CAD: ICD-10-PCS | Mod: 26,,, | Performed by: RADIOLOGY

## 2019-04-08 PROCEDURE — 76642 ULTRASOUND BREAST LIMITED: CPT | Mod: TC,RT

## 2019-04-08 PROCEDURE — 77065 MAMMO DIGITAL DIAGNOSTIC RIGHT WITH TOMOSYNTHESIS_CAD: ICD-10-PCS | Mod: 26,,, | Performed by: RADIOLOGY

## 2019-04-08 PROCEDURE — 77065 DX MAMMO INCL CAD UNI: CPT | Mod: 26,,, | Performed by: RADIOLOGY

## 2019-04-08 PROCEDURE — 76642 ULTRASOUND BREAST LIMITED: CPT | Mod: 26,RT,, | Performed by: RADIOLOGY

## 2019-04-08 PROCEDURE — 76642 US BREAST RIGHT LIMITED: ICD-10-PCS | Mod: 26,RT,, | Performed by: RADIOLOGY

## 2019-04-08 PROCEDURE — 77065 DX MAMMO INCL CAD UNI: CPT | Mod: TC

## 2019-04-11 ENCOUNTER — PATIENT MESSAGE (OUTPATIENT)
Dept: FAMILY MEDICINE | Facility: CLINIC | Age: 64
End: 2019-04-11

## 2019-04-12 ENCOUNTER — TELEPHONE (OUTPATIENT)
Dept: ENDOSCOPY | Facility: HOSPITAL | Age: 64
End: 2019-04-12

## 2019-04-12 NOTE — TELEPHONE ENCOUNTER
Endoscopy Scheduling Questionnaire:    1. Have you been admitted overnight to the hospital in the past 3 months? no  2. Do you get CP and SOB while walking up a flight of stairs? no  3. Have you had a stent placed in the past 12 months? no  4. Have you had a stroke or heart attack in the past 6 months? no  5. Have you had any chest pain in the past 3 months? no      If so, have you been evaluated by your PCP or Cardiologist? no  6. Do you take weight loss medications? no  7. Have you been diagnosed with Diverticulitis within the past 3 months? no  8. Are you having any GI symptoms that you feel need to be evaluated prior to your procedure? no  9. Are you on dialysis? no  10. Are you diabetic? no  11. Do you have any other health issues that you feel might limit your ability to safely have the procedure and/or sedation? no  12. Is the patient over 81 yo? no        If so, has the patient been seen by their PCP or GI in the last 3 months? N/A       -I have reviewed the last colonoscopy for recommendations regarding surveillance and bowel prep  Yes  -I have reviewed the patient's medications and allergies. She is not on high risk medication, Na, and will require cardiac clearance. A clearance request NA  -I have verified the pharmacy information. The prep being used is Suprep. The patient's prep instructions were sent via mail..    Date Endoscopy Scheduled: Date: 5/29/19

## 2019-04-15 RX ORDER — SODIUM, POTASSIUM,MAG SULFATES 17.5-3.13G
1 SOLUTION, RECONSTITUTED, ORAL ORAL DAILY
Qty: 1 KIT | Refills: 0 | Status: SHIPPED | OUTPATIENT
Start: 2019-04-15 | End: 2019-04-17

## 2019-05-08 RX ORDER — HYDROCHLOROTHIAZIDE 12.5 MG/1
CAPSULE ORAL
Qty: 30 CAPSULE | Refills: 5 | Status: SHIPPED | OUTPATIENT
Start: 2019-05-08 | End: 2019-09-09 | Stop reason: SDUPTHER

## 2019-05-13 RX ORDER — ALLOPURINOL 100 MG/1
TABLET ORAL
Qty: 30 TABLET | Refills: 5 | Status: SHIPPED | OUTPATIENT
Start: 2019-05-13 | End: 2019-09-09 | Stop reason: SDUPTHER

## 2019-05-29 ENCOUNTER — HOSPITAL ENCOUNTER (OUTPATIENT)
Facility: HOSPITAL | Age: 64
Discharge: HOME | End: 2019-05-29
Attending: SURGERY | Admitting: SURGERY
Payer: MEDICARE

## 2019-05-29 ENCOUNTER — ANESTHESIA (OUTPATIENT)
Dept: ENDOSCOPY | Facility: HOSPITAL | Age: 64
End: 2019-05-29
Payer: MEDICARE

## 2019-05-29 ENCOUNTER — ANESTHESIA EVENT (OUTPATIENT)
Dept: ENDOSCOPY | Facility: HOSPITAL | Age: 64
End: 2019-05-29
Payer: MEDICARE

## 2019-05-29 VITALS
WEIGHT: 200.81 LBS | BODY MASS INDEX: 32.27 KG/M2 | RESPIRATION RATE: 18 BRPM | DIASTOLIC BLOOD PRESSURE: 76 MMHG | OXYGEN SATURATION: 97 % | TEMPERATURE: 98 F | SYSTOLIC BLOOD PRESSURE: 122 MMHG | HEIGHT: 66 IN | HEART RATE: 60 BPM

## 2019-05-29 DIAGNOSIS — Z12.11 SPECIAL SCREENING FOR MALIGNANT NEOPLASMS, COLON: ICD-10-CM

## 2019-05-29 PROCEDURE — 37000008 HC ANESTHESIA 1ST 15 MINUTES: Performed by: SURGERY

## 2019-05-29 PROCEDURE — 25000003 PHARM REV CODE 250: Performed by: NURSE ANESTHETIST, CERTIFIED REGISTERED

## 2019-05-29 PROCEDURE — G0105 COLORECTAL SCRN; HI RISK IND: ICD-10-PCS | Mod: ,,, | Performed by: SURGERY

## 2019-05-29 PROCEDURE — 25000003 PHARM REV CODE 250: Performed by: SURGERY

## 2019-05-29 PROCEDURE — 37000009 HC ANESTHESIA EA ADD 15 MINS: Performed by: SURGERY

## 2019-05-29 PROCEDURE — G0105 COLORECTAL SCRN; HI RISK IND: HCPCS | Performed by: SURGERY

## 2019-05-29 PROCEDURE — G0105 COLORECTAL SCRN; HI RISK IND: HCPCS | Mod: ,,, | Performed by: SURGERY

## 2019-05-29 PROCEDURE — 63600175 PHARM REV CODE 636 W HCPCS: Performed by: NURSE ANESTHETIST, CERTIFIED REGISTERED

## 2019-05-29 RX ORDER — LIDOCAINE HYDROCHLORIDE 10 MG/ML
INJECTION, SOLUTION EPIDURAL; INFILTRATION; INTRACAUDAL; PERINEURAL
Status: DISCONTINUED | OUTPATIENT
Start: 2019-05-29 | End: 2019-05-29

## 2019-05-29 RX ORDER — SODIUM CHLORIDE 0.9 % (FLUSH) 0.9 %
10 SYRINGE (ML) INJECTION
Status: DISCONTINUED | OUTPATIENT
Start: 2019-05-29 | End: 2019-05-29 | Stop reason: HOSPADM

## 2019-05-29 RX ORDER — SODIUM CHLORIDE, SODIUM LACTATE, POTASSIUM CHLORIDE, CALCIUM CHLORIDE 600; 310; 30; 20 MG/100ML; MG/100ML; MG/100ML; MG/100ML
INJECTION, SOLUTION INTRAVENOUS CONTINUOUS
Status: DISCONTINUED | OUTPATIENT
Start: 2019-05-29 | End: 2019-05-29 | Stop reason: HOSPADM

## 2019-05-29 RX ORDER — PROPOFOL 10 MG/ML
VIAL (ML) INTRAVENOUS
Status: DISCONTINUED | OUTPATIENT
Start: 2019-05-29 | End: 2019-05-29

## 2019-05-29 RX ADMIN — SODIUM CHLORIDE, SODIUM LACTATE, POTASSIUM CHLORIDE, AND CALCIUM CHLORIDE: .6; .31; .03; .02 INJECTION, SOLUTION INTRAVENOUS at 06:05

## 2019-05-29 RX ADMIN — PROPOFOL 80 MG: 10 INJECTION, EMULSION INTRAVENOUS at 06:05

## 2019-05-29 RX ADMIN — PROPOFOL 20 MG: 10 INJECTION, EMULSION INTRAVENOUS at 06:05

## 2019-05-29 RX ADMIN — LIDOCAINE HYDROCHLORIDE 50 MG: 10 INJECTION, SOLUTION EPIDURAL; INFILTRATION; INTRACAUDAL; PERINEURAL at 06:05

## 2019-05-29 RX ADMIN — PROPOFOL 20 MG: 10 INJECTION, EMULSION INTRAVENOUS at 07:05

## 2019-05-29 NOTE — DISCHARGE SUMMARY
[facility]  Short Stay  Discharge Summary    Admit Date: 5/29/2019    Discharge Date and Time: 5/29/2019      Discharge Attending Physician: To Clay MD     Reason for Admission: Surveillance colonoscopy    Hospital Course (synopsis of major diagnoses, care, treatment, and services provided during the course of the hospital stay): Uneventful and full recovery    Final Diagnoses:    Principal Problem: Special screening for malignant neoplasms, colon   Secondary Diagnoses: Special screening for malignant neoplasms, colon    Procedures Performed: Procedure(s) (LRB):  COLONOSCOPY (N/A)      Discharged Condition: good    Disposition: Home or Self Care     Diet: regular.    Discharge Condition: Stable    Follow up/Patient Instructions:    Follow-up Information     Tamela Lance MD.    Specialty:  Family Medicine  Why:  As needed  Contact information:  [address]  [place] [address] [phone]                   Medications:      Medication List      CONTINUE taking these medications    allopurinol 100 MG tablet  Commonly known as:  ZYLOPRIM  TAKE 1 TABLET BY MOUTH ONCE DAILY     ARTIFICIAL TEARS([id number]) ophthalmic solution  Generic drug:  dextran 70-hypromellose     * atenolol 100 MG tablet  Commonly known as:  TENORMIN  TAKE 1 TABLET(100 MG) BY MOUTH EVERY DAY     * atenolol 50 MG tablet  Commonly known as:  TENORMIN  TAKE 1 TABLET(50 MG) BY MOUTH EVERY DAY     calcium carbonate-vitamin D2 600 mg calcium- 200 unit Cap     COMBIGAN 0.2-0.5 % Drop  Generic drug:  brimonidine-timolol  INSTILL 1 DROP INTO BOTH EYES TWICE A DAY     hydroCHLOROthiazide 12.5 mg capsule  Commonly known as:  MICROZIDE  TAKE 1 CAPSULE BY MOUTH ONCE DAILY     irbesartan 300 MG tablet  Commonly known as:  AVAPRO  Take 1 tablet (300 mg total) by mouth once daily.     latanoprost 0.005 % ophthalmic solution  place 1 drop into both eyes at bedtime     levothyroxine 100 MCG tablet  Commonly known as:  SYNTHROID  Take 1  pill before breakfast Sundays through Fridays only (DO not take on Saturdays)     meloxicam 15 MG tablet  Commonly known as:  MOBIC  Take 1 tablet (15 mg total) by mouth once daily.         * This list has 2 medication(s) that are the same as other medications prescribed for you. Read the directions carefully, and ask your doctor or other care provider to review them with you.            ASK your doctor about these medications    gatifloxacin 0.5 % Drop drops  Apply 1 drop to eye 2 (two) times daily. Eyedrops to start one day before surgery     ketorolac 0.5% 0.5 % Drop  Commonly known as:  ACULAR  Place 1 drop into the left eye 4 (four) times daily. Eyedrops to start one day before surgery     prednisoLONE acetate 1 % Drps  Commonly known as:  PRED FORTE  Place 1 drop into the left eye 4 (four) times daily. Eyedrops to start one day before surgery          Discharge Procedure Orders   Diet general     Call MD for:  temperature >100.4     Call MD for:  persistent nausea and vomiting     Call MD for:  severe uncontrolled pain     Call MD for:  difficulty breathing, headache or visual disturbances     Call MD for:  redness, tenderness, or signs of infection (pain, swelling, redness, odor or green/yellow discharge around incision site)     Call MD for:  hives     Call MD for:  persistent dizziness or light-headedness     No dressing needed       To Clay

## 2019-05-29 NOTE — PLAN OF CARE
Neto ruth at bedside. Dr goetz spoke to pt and son. Discharge instructions given. Verbalize understanding.

## 2019-05-29 NOTE — ANESTHESIA POSTPROCEDURE EVALUATION
Anesthesia Post Evaluation    Patient: Sophia Hope    Procedure(s) Performed: Procedure(s) (LRB):  COLONOSCOPY (N/A)    Final Anesthesia Type: MAC  Patient location during evaluation: PACU  Patient participation: Yes- Able to Participate  Level of consciousness: awake  Post-procedure vital signs: reviewed and stable  Pain management: adequate  Airway patency: patent  PONV status at discharge: No PONV  Anesthetic complications: no      Cardiovascular status: blood pressure returned to baseline and hemodynamically stable  Respiratory status: unassisted, room air and spontaneous ventilation  Hydration status: euvolemic  Follow-up not needed.          Vitals Value Taken Time   /89 5/29/2019  6:38 AM   Temp 36.8 °C (98.2 °F) 5/29/2019  6:38 AM   Pulse 72 5/29/2019  6:38 AM   Resp 20 5/29/2019  6:38 AM   SpO2 100 % 5/29/2019  6:38 AM         No case tracking events are documented in the log.      Pain/Luigi Score: No data recorded

## 2019-05-29 NOTE — ANESTHESIA PREPROCEDURE EVALUATION
05/29/2019  Sophia Hope is a 63 y.o., female.    Anesthesia Evaluation    I have reviewed the Patient Summary Reports.    I have reviewed the Nursing Notes.   I have reviewed the Medications.     Review of Systems  Anesthesia Hx:  No problems with previous Anesthesia  Denies Family Hx of Anesthesia complications.   Denies Personal Hx of Anesthesia complications.   Social:  Former Smoker, No Alcohol Use    Hematology/Oncology:  Hematology Normal   Oncology Normal     EENT/Dental:   Glaucoma  cataract   Cardiovascular:   Hypertension Denies MI.   Denies CABG/stent. Dysrhythmias      ECG has been reviewed. ekg 6/2018:  Sinus rhythm with occasional Premature 81 ventricular complexes  Moderate voltage criteria for LVH, may be normal variant  Borderline Abnormal ECG  No previous ECGs available    Echo 8/2018:  1 - No wall motion abnormalities.     2 - Normal left ventricular systolic function (EF 60-65%).     3 - Normal left ventricular diastolic function.     4 - Normal right ventricular systolic function .     5 - The estimated PA systolic pressure is 36 mmHg   Pulmonary:   Denies COPD.  Denies Asthma.  Denies Sleep Apnea.    Renal/:  Renal/ Normal     Hepatic/GI:   Bowel Prep. Denies GERD. Denies Liver Disease.  Denies Hepatitis.    Musculoskeletal:   Arthritis  Osteopenia   Neurological:   Denies CVA. Denies Seizures.    Endocrine:   Denies Diabetes. Hypothyroidism    Dermatological:   History of herpes zoster       Physical Exam  General:  Obesity    Airway/Jaw/Neck:  Airway Findings: Mouth Opening: Normal Tongue: Normal  General Airway Assessment: Adult  Mallampati: II      Dental:  Dental Findings: In tact   Chest/Lungs:  Chest/Lungs Findings: Clear to auscultation, Normal Respiratory Rate     Heart/Vascular:  Heart Findings: Rate: Normal  Rhythm: Regular Rhythm  Sounds: Normal             Anesthesia  Plan  Type of Anesthesia, risks & benefits discussed:  Anesthesia Type:  MAC  Patient's Preference:   Intra-op Monitoring Plan: standard ASA monitors  Intra-op Monitoring Plan Comments:   Post Op Pain Control Plan:   Post Op Pain Control Plan Comments:   Induction:   IV  Beta Blocker:  Patient is on a Beta-Blocker and has received one dose within the past 24 hours (No further documentation required).       Informed Consent: Patient understands risks and agrees with Anesthesia plan.  Questions answered. Anesthesia consent signed with patient.  ASA Score: 2     Day of Surgery Review of History & Physical: I have interviewed and examined the patient. I have reviewed the patient's H&P dated:  There are no significant changes.  H&P update referred to the surgeon.         Ready For Surgery From Anesthesia Perspective.

## 2019-05-29 NOTE — OR NURSING
Final Time Out. All agree pt is sedated and ready for procedure. See anesthesia for vital signs and medication details.

## 2019-05-29 NOTE — H&P
Short Stay Endoscopy History and Physical    PCP - Tamela Lance MD    Procedure - Colonoscopy  ASA - 1  Mallampati - per anesthesia  History of Anesthesia problems - no  Family history Anesthesia problems -  no     HPI:  This is a 63 y.o.female here for evaluation of : Surveillance colonoscopy for previous polyps.    Reflux - no  Dysphagia - no  Abdominal pain - no  Diarrhea - no  Anemia - no  GI bleeding - no  Nausea and vomiting-no  Early satiety-no  aversion to sight or smell of food-no    ROS:  Constitutional: No fevers, chills, No weight loss  ENT: No allergies  CV: No chest pain  Pulm: No cough, No shortness of breath  Ophtho: No vision changes  GI: see HPI  Derm: No rash  Heme: No lymphadenopathy, No bruising  MSK: No arthritis  : No dysuria, No hematuria  Endo: No hot or cold intolerance  Neuro: No syncope, No seizure  Psych: No anxiety, No depression    Medical History:  Past Medical History:   Diagnosis Date    Arthritis     Benign colon polyp     Cataract     Diverticulosis     Glaucoma (increased eye pressure)     History of abnormal Pap smear     History of herpes zoster     Right flank 4/30/12    Hypertension     Hypothyroidism     Osteopenia     Postmenopausal        Surgical History:  Past Surgical History:   Procedure Laterality Date    BTL      CATARACT EXTRACTION W/  INTRAOCULAR LENS IMPLANT Left 12/19/2018    w/ ISTENT(INJ)    CATARACT EXTRACTION W/  INTRAOCULAR LENS IMPLANT Right 07/25/2018    W/ ISTENT    CERVIX LESION DESTRUCTION      Abnormal PAP    COLONOSCOPY N/A 4/14/2014    Performed by [facility] at [facility]    hysteroscopic ablation      I-STENT Right 07/25/2018    DR. HENRY    PCIOL Right 07/25/2018    DR. HENRY    TX DILATION/CURETTAGE,DIAGNOSTIC      D & C    TONSILLECTOMY         Family History:  Family History   Problem Relation Age of Onset    Cancer Mother         stomach cancer    Cataracts Mother     Hypertension Sister     Cancer  Sister         liver cancer    Hypertension Sister     Heart disease Brother         MI/CAD    No Known Problems Daughter     No Known Problems Son     Diabetes Neg Hx     Stroke Neg Hx     Blindness Neg Hx     Glaucoma Neg Hx     Macular degeneration Neg Hx     Retinal detachment Neg Hx     Strabismus Neg Hx     Thyroid disease Neg Hx        Social History:  Social History     Socioeconomic History    Marital status:      Spouse name: Not on file    Number of children: 2    Years of education: Not on file    Highest education level: Not on file   Occupational History    Occupation: Retired   Social Needs    Financial resource strain: Not on file    Food insecurity:     Worry: Not on file     Inability: Not on file    Transportation needs:     Medical: Not on file     Non-medical: Not on file   Tobacco Use    Smoking status: Former Smoker     Years: 5.00     Types: Cigarettes     Last attempt to quit: 1983     Years since quittin.0    Smokeless tobacco: Never Used   Substance and Sexual Activity    Alcohol use: No     Alcohol/week: 0.0 oz    Drug use: No    Sexual activity: Never   Lifestyle    Physical activity:     Days per week: 4 days     Minutes per session: 60 min    Stress: Not at all   Relationships    Social connections:     Talks on phone: More than three times a week     Gets together: More than three times a week     Attends [employer] service: More than 4 times per year     Active member of club or organization: Yes     Attends meetings of clubs or organizations: 1 to 4 times per year     Relationship status:    Other Topics Concern    Not on file   Social History Narrative    She wears seatbelt.       Allergies:   Review of patient's allergies indicates:   Allergen Reactions    Clonidine      Other reaction(s): Rash    Cardizem [diltiazem hcl] Rash     Years ago per patient       Medications:   No current facility-administered medications on file  prior to encounter.      Current Outpatient Medications on File Prior to Encounter   Medication Sig Dispense Refill    calcium carbonate-vitamin D2 600 mg calcium- 200 unit Cap Take 1 tablet by mouth Twice daily.      COMBIGAN 0.2-0.5 % Drop INSTILL 1 DROP INTO BOTH EYES TWICE A DAY 10 mL 12    dextran 70-hypromellose (ARTIFICIAL TEARS) ophthalmic solution Place 1 drop into both eyes. BOTH EYES PRN       gatifloxacin 0.5 % Drop drops Apply 1 drop to eye 2 (two) times daily. Eyedrops to start one day before surgery 1 Bottle 2    latanoprost 0.005 % ophthalmic solution place 1 drop into both eyes at bedtime 2.5 mL 12    ketorolac 0.5% (ACULAR) 0.5 % Drop Place 1 drop into the left eye 4 (four) times daily. Eyedrops to start one day before surgery 1 Bottle 2    meloxicam (MOBIC) 15 MG tablet Take 1 tablet (15 mg total) by mouth once daily. 30 tablet 6    prednisoLONE acetate (PRED FORTE) 1 % DrpS Place 1 drop into the left eye 4 (four) times daily. Eyedrops to start one day before surgery 1 Bottle 2       Objective Findings:    Vital Signs:  Vitals:    05/29/19 0638   BP: (!) 149/89   Pulse: 72   Resp: 20   Temp: 98.2 °F (36.8 °C)           Physical Exam:  General Appearance: Well appearing in no acute distress  Eyes:    No scleral icterus  ENT: Neck supple, Lips, mucosa, and tongue normal; teeth and gums normal  Lungs: CTA bilaterally in anterior and posterior fields, no wheezes, no crackles.  Heart:  Regular rate, S1, S2 normal, no murmurs heard.  Abdomen: Soft, non tender, non distended with normal bowel sounds. No hepatosplenomegaly, ascites, or mass.  Extremities: No clubbing, cyanosis or edema  Skin: No rash    Labs:  Reviewed    Plan:  I have explained the risks and benefits of endoscopy procedures to the patient including but not limited to bleeding, perforation, infection, and death. The patient wishes to proceed.     To Clay

## 2019-05-29 NOTE — [employer]ATION PATIENT INSTRUCTIONS
Discharge Summary/Instructions after an Endoscopic Procedure  Patient Name: Sophia Hope  Patient MRN: [id number]  Patient YOB: 1955  Wednesday, May 29, 2019 To Clay MD  RESTRICTIONS:  During your procedure today, you received medications for sedation.  These   medications may affect your judgment, balance and coordination.  Therefore,   for 24 hours, you have the following restrictions:   - DO NOT drive a car, operate machinery, make legal/financial decisions,   sign important papers or drink alcohol.    ACTIVITY:  Today: no heavy lifting, straining or running due to procedural   sedation/anesthesia.  The following day: return to full activity including work.  DIET:  Eat and drink normally unless instructed otherwise.     TREATMENT FOR COMMON SIDE EFFECTS:  - Mild abdominal pain, nausea, belching, bloating or excessive gas:  rest,   eat lightly and use a heating pad.  - Sore Throat: treat with throat lozenges and/or gargle with warm salt   water.  - Because air was used during the procedure, expelling large amounts of air   from your rectum or belching is normal.  - If a bowel prep was taken, you may not have a bowel movement for 1-3 days.    This is normal.  SYMPTOMS TO WATCH FOR AND REPORT TO YOUR PHYSICIAN:  1. Abdominal pain or bloating, other than gas cramps.  2. Chest pain.  3. Back pain.  4. Signs of infection such as: chills or fever occurring within 24 hours   after the procedure.  5. Rectal bleeding, which would show as bright red, maroon, or black stools.   (A tablespoon of blood from the rectum is not serious, especially if   hemorrhoids are present.)  6. Vomiting.  7. Weakness or dizziness.  GO DIRECTLY TO THE NEAREST EMERGENCY ROOM IF YOU HAVE ANY OF THE FOLLOWING:      Difficulty breathing              Chills and/or fever over 101 F   Persistent vomiting and/or vomiting blood   Severe abdominal pain   Severe chest pain   Black, tarry stools   Bleeding- more than one tablespoon   Any  other symptom or condition that you feel may need urgent attention  Your doctor recommends these additional instructions:  If any biopsies were taken, your doctors clinic will contact you in 1 to 2   weeks with any results.  - Discharge patient to home (ambulatory).   - Patient has a contact number available for emergencies.  The signs and   symptoms of potential delayed complications were discussed with the   patient.  Return to normal activities tomorrow.  Written discharge   instructions were provided to the patient.   - Repeat colonoscopy in 5 years for surveillance.   - Return to primary care physician PRN.  For questions, problems or results please call your physician To Clay MD   at Work:  [phone]  If you have any questions about the above instructions, call the GI   department at [phone] or call the endoscopy unit at [phone]   from 7am until 3 pm.  [facility] [place], EMERGENCY ROOM PHONE NUMBER:   [phone]  IF A COMPLICATION OR EMERGENCY SITUATION ARISES AND YOU ARE UNABLE TO REACH   YOUR PHYSICIAN - GO DIRECTLY TO THE EMERGENCY ROOM.  I have read or have had read to me these discharge instructions for my   procedure and have received a written copy.  I understand these   instructions and will follow-up with my physician if I have any questions.     __________________________________       _____________________________________  Nurse Signature                                          Patient/Designated   Responsible Party Signature  To Clay MD  5/29/2019 7:18:47 AM  This report has been verified and signed electronically.  [employer]ATION

## 2019-05-29 NOTE — TRANSFER OF CARE
"Anesthesia Transfer of Care Note    Patient: Sophia Hope    Procedure(s) Performed: Procedure(s) (LRB):  COLONOSCOPY (N/A)    Patient location: PACU    Anesthesia Type: MAC    Transport from OR: Transported from OR on room air with adequate spontaneous ventilation    Post pain: adequate analgesia    Post assessment: no apparent anesthetic complications and tolerated procedure well    Post vital signs: stable    Level of consciousness: awake    Nausea/Vomiting: no nausea/vomiting    Complications: none    Transfer of care protocol was followed      Last vitals:   Visit Vitals  BP (!) 149/89 (BP Location: Left arm, Patient Position: Lying)   Pulse 72   Temp 36.8 °C (98.2 °F) (Oral)   Resp 20   Ht 5' 6" (1.676 m)   Wt 91.1 kg (200 lb 13.4 oz)   SpO2 100%   Breastfeeding? No   BMI 32.42 kg/m²     "

## 2019-06-07 ENCOUNTER — OFFICE VISIT (OUTPATIENT)
Dept: OPHTHALMOLOGY | Facility: CLINIC | Age: 64
End: 2019-06-07
Payer: MEDICARE

## 2019-06-07 DIAGNOSIS — Z96.1 PSEUDOPHAKIA OF BOTH EYES: ICD-10-CM

## 2019-06-07 DIAGNOSIS — H40.1231 LOW-TENSION GLAUCOMA OF BOTH EYES, MILD STAGE: Primary | ICD-10-CM

## 2019-06-07 PROCEDURE — 92012 INTRM OPH EXAM EST PATIENT: CPT | Mod: S$PBB,,, | Performed by: OPHTHALMOLOGY

## 2019-06-07 PROCEDURE — 92133 CPTRZD OPH DX IMG PST SGM ON: CPT | Mod: PBBFAC | Performed by: OPHTHALMOLOGY

## 2019-06-07 PROCEDURE — 99999 PR PBB SHADOW E&M-EST. PATIENT-LVL II: ICD-10-PCS | Mod: PBBFAC,,, | Performed by: OPHTHALMOLOGY

## 2019-06-07 PROCEDURE — 99999 PR PBB SHADOW E&M-EST. PATIENT-LVL II: CPT | Mod: PBBFAC,,, | Performed by: OPHTHALMOLOGY

## 2019-06-07 PROCEDURE — 99212 OFFICE O/P EST SF 10 MIN: CPT | Mod: PBBFAC,25 | Performed by: OPHTHALMOLOGY

## 2019-06-07 PROCEDURE — 92012 PR EYE EXAM, EST PATIENT,INTERMED: ICD-10-PCS | Mod: S$PBB,,, | Performed by: OPHTHALMOLOGY

## 2019-06-07 PROCEDURE — 92133 POSTERIOR SEGMENT OCT OPTIC NERVE(OCULAR COHERENCE TOMOGRAPHY) - OU - BOTH EYES: ICD-10-PCS | Mod: 26,S$PBB,, | Performed by: OPHTHALMOLOGY

## 2019-06-07 NOTE — PROGRESS NOTES
SUBJECTIVE:   Sophia Hope is a 63 y.o. female   Uncorrected distance visual acuity was 20/25 +1 in the right eye and 20/25 in the left eye.   Chief Complaint   Patient presents with    Glaucoma     4 month GOCT and IOP check, Combigan BID OD, Xalatan QHS OU        HPI:  HPI     Glaucoma      Additional comments: 4 month GOCT and IOP check, Combigan BID OD,   Xalatan QHS OU              Comments     Pt states no problems since her last visit. Using her drops as directed.   No pain or irritation.    1. Mild LTG No FHx (init 17/17=21/21 adjusted for thin K's)  Goal <14  CCT /  2. PCIOL I-Stent (inject) OS +20.5 SN60WF (distance)12/19/2018  PCIOL / I-Stent OD +20.5 SN60WF (distance) 7-25-18  3. Ptosis    AT's prn OU    Combigan QAM OD  Xalatan QHS OU          Last edited by Shane Smith on 6/7/2019  8:19 AM. (History)        Assessment /Plan :  1. Low-tension glaucoma of both eyes, mild stage Doing well, IOP within acceptable range relative to target IOP and no evidence of progression. Continue current treatment. Reviewed importance of continued compliance with treatment and follow up.     2. Pseudophakia of both eyes  -- Condition stable, no therapeutic change required. Monitoring routinely.     Continue:  Combigan qam OD and Xalatan qhs OU    Return to clinic in 4 months  or as needed.  With Dilation, HVF 24-2 and SDP

## 2019-07-19 RX ORDER — LATANOPROST 50 UG/ML
SOLUTION/ DROPS OPHTHALMIC
Qty: 2.5 ML | Refills: 12 | Status: SHIPPED | OUTPATIENT
Start: 2019-07-19 | End: 2020-07-15

## 2019-08-22 DIAGNOSIS — I10 ESSENTIAL HYPERTENSION: Chronic | ICD-10-CM

## 2019-08-22 DIAGNOSIS — R00.2 PALPITATIONS: ICD-10-CM

## 2019-08-22 RX ORDER — ATENOLOL 100 MG/1
TABLET ORAL
Qty: 90 TABLET | Refills: 1 | Status: SHIPPED | OUTPATIENT
Start: 2019-08-22 | End: 2020-02-20

## 2019-08-29 RX ORDER — LEVOTHYROXINE SODIUM 100 UG/1
TABLET ORAL
Qty: 77 TABLET | Refills: 1 | Status: SHIPPED | OUTPATIENT
Start: 2019-08-29 | End: 2019-09-10 | Stop reason: SDUPTHER

## 2019-09-09 ENCOUNTER — OFFICE VISIT (OUTPATIENT)
Dept: FAMILY MEDICINE | Facility: CLINIC | Age: 64
End: 2019-09-09
Payer: MEDICARE

## 2019-09-09 ENCOUNTER — LAB VISIT (OUTPATIENT)
Dept: LAB | Facility: HOSPITAL | Age: 64
End: 2019-09-09
Payer: MEDICARE

## 2019-09-09 VITALS
TEMPERATURE: 98 F | OXYGEN SATURATION: 98 % | SYSTOLIC BLOOD PRESSURE: 118 MMHG | BODY MASS INDEX: 33.22 KG/M2 | WEIGHT: 206.69 LBS | HEIGHT: 66 IN | HEART RATE: 74 BPM | DIASTOLIC BLOOD PRESSURE: 62 MMHG

## 2019-09-09 DIAGNOSIS — E03.4 HYPOTHYROIDISM DUE TO ACQUIRED ATROPHY OF THYROID: Chronic | ICD-10-CM

## 2019-09-09 DIAGNOSIS — I10 ESSENTIAL HYPERTENSION: Primary | Chronic | ICD-10-CM

## 2019-09-09 DIAGNOSIS — E66.9 OBESITY (BMI 30.0-34.9): ICD-10-CM

## 2019-09-09 DIAGNOSIS — Z23 NEED FOR INFLUENZA VACCINATION: ICD-10-CM

## 2019-09-09 LAB
T4 FREE SERPL-MCNC: 1.47 NG/DL (ref 0.71–1.51)
TSH SERPL DL<=0.005 MIU/L-ACNC: 0.31 UIU/ML (ref 0.4–4)

## 2019-09-09 PROCEDURE — 99999 PR PBB SHADOW E&M-EST. PATIENT-LVL IV: CPT | Mod: PBBFAC,,, | Performed by: FAMILY MEDICINE

## 2019-09-09 PROCEDURE — 84443 ASSAY THYROID STIM HORMONE: CPT

## 2019-09-09 PROCEDURE — 99214 OFFICE O/P EST MOD 30 MIN: CPT | Mod: PBBFAC,PO,25 | Performed by: FAMILY MEDICINE

## 2019-09-09 PROCEDURE — 90662 IIV NO PRSV INCREASED AG IM: CPT | Mod: PBBFAC,PO

## 2019-09-09 PROCEDURE — 99214 OFFICE O/P EST MOD 30 MIN: CPT | Mod: 25,S$PBB,, | Performed by: FAMILY MEDICINE

## 2019-09-09 PROCEDURE — 99214 PR OFFICE/OUTPT VISIT, EST, LEVL IV, 30-39 MIN: ICD-10-PCS | Mod: 25,S$PBB,, | Performed by: FAMILY MEDICINE

## 2019-09-09 PROCEDURE — 99999 PR PBB SHADOW E&M-EST. PATIENT-LVL IV: ICD-10-PCS | Mod: PBBFAC,,, | Performed by: FAMILY MEDICINE

## 2019-09-09 PROCEDURE — 84439 ASSAY OF FREE THYROXINE: CPT

## 2019-09-09 PROCEDURE — 36415 COLL VENOUS BLD VENIPUNCTURE: CPT | Mod: PO

## 2019-09-09 NOTE — PROGRESS NOTES
Sophia Hope    Chief Complaint   Patient presents with    Hypertension    Follow-up       History of Present Illness:   Ms. Hope comes in today for 6-month hypertension follow-up.  She is fasting but has taken medication today.  She states she has been monitoring her diet and exercises 3 to 4 times a week - 50 minutes each time.    She reports having subtle swelling just above right knee without associated trauma, pain or difficulty with walking for x couple months.     She reports no acute problems today.  She denies fever, chills, fatigue, appetite change; hot or cold intolerance; abdominal pain, nausea, vomiting, diarrhea, constipation; chest pain, palpitations, leg swelling; shortness of breath, cough, wheezing; unusual urinary symptoms; back pain; headaches; anxiety or depression, homicidal or suicidal thoughts.     She follows with Dr. Scott, ophthalmologist, for surveillance and treatment of glaucoma with last visit on June 7, 2019 and 3-month follow-up advised and scheduled for October 7, 2019.    Labs:                      WBC                      5.08                03/07/2019                 HGB                      12.7                03/07/2019                 HCT                      40.4                03/07/2019                 PLT                      191                 03/07/2019                 CHOL                     177                 03/07/2019                 TRIG                     126                 03/07/2019                 HDL                      36 (L)              03/07/2019                 ALT                      15                  03/07/2019                 AST                      22                  03/07/2019                 NA                       139                 03/07/2019                 K                        3.9                 03/07/2019                 CL                       104                 03/07/2019                 CREATININE                1.0                 03/07/2019                 BUN                      16                  03/07/2019                 CO2                      27                  03/07/2019                 TSH                      0.343 (L)           03/07/2019                 HGBA1C                   5.2                 11/23/2015                LDLCALC                  115.8               03/07/2019             URICACID                 5.3                 03/07/2019                       Current Outpatient Medications   Medication Sig    allopurinol (ZYLOPRIM) 100 MG tablet TAKE 1 TABLET BY MOUTH ONCE DAILY    atenolol (TENORMIN) 100 MG tablet TAKE 1 TABLET(100 MG) BY MOUTH EVERY DAY    atenolol (TENORMIN) 50 MG tablet TAKE 1 TABLET(50 MG) BY MOUTH EVERY DAY    calcium carbonate-vitamin D2 600 mg calcium- 200 unit Cap Take 1 tablet by mouth Twice daily.    COMBIGAN 0.2-0.5 % Drop INSTILL 1 DROP INTO BOTH EYES TWICE A DAY    dextran 70-hypromellose (ARTIFICIAL TEARS) ophthalmic solution Place 1 drop into both eyes. BOTH EYES PRN     hydroCHLOROthiazide (MICROZIDE) 12.5 mg capsule TAKE 1 CAPSULE BY MOUTH ONCE DAILY    irbesartan (AVAPRO) 300 MG tablet Take 1 tablet (300 mg total) by mouth once daily.    latanoprost 0.005 % ophthalmic solution INSTILL 1 DROP IN BOTH EYES EVERY DAY AT BEDTIME    levothyroxine (SYNTHROID) 100 MCG tablet TAKE 1 TABLET BY MOUTH DAILY BEFORE BREAKFAST SUNDAYS THROUGH FRIDAYS ONLY, DO NOT TAKE ON SATURDAYS. (Patient taking differently: Take 100 mcg by mouth before breakfast. TAKE 1 TABLET BY MOUTH DAILY BEFORE BREAKFAST SUNDAYS THROUGH FRIDAYS ONLY, DO NOT TAKE ON SATURDAYS.)    meloxicam (MOBIC) 15 MG tablet Take 1 tablet (15 mg total) by mouth once daily.     Review of Systems   Constitutional: Negative for activity change, appetite change, chills, fatigue, fever and unexpected weight change.        Weight  91.7 kg (202 lb 4.4 oz) at March 7, 2019 visit.   Eyes:        See history of present  illness examination.   Respiratory: Negative for cough, shortness of breath and wheezing.    Cardiovascular: Negative for chest pain, palpitations and leg swelling.   Gastrointestinal: Negative for abdominal pain, constipation, diarrhea, nausea and vomiting.   Endocrine: Negative for cold intolerance and heat intolerance.        See history of present illness examination.   Genitourinary: Negative for difficulty urinating.   Musculoskeletal: Positive for joint swelling. Negative for back pain.   Neurological: Negative for headaches.   Psychiatric/Behavioral: Negative for dysphoric mood and suicidal ideas. The patient is not nervous/anxious.         Negative for homicidal ideas.       Objective:  Physical Exam   Constitutional: She is oriented to person, place, and time. She appears well-developed and well-nourished. No distress.   Neck: Normal range of motion. Neck supple. No thyromegaly present.   Cardiovascular: Normal rate, regular rhythm, normal heart sounds and intact distal pulses.   No murmur heard.  Pulmonary/Chest: Effort normal and breath sounds normal. No respiratory distress. She has no wheezes.   Abdominal: Soft. Bowel sounds are normal. She exhibits no distension and no mass. There is no tenderness. There is no rebound and no guarding.   Musculoskeletal: Normal range of motion. She exhibits no edema or tenderness.   She is ambulatory without problems.  Subtle difference in subcutaneous fat superior to right knee compared to left knee.   Lymphadenopathy:     She has no cervical adenopathy.   Neurological: She is alert and oriented to person, place, and time.   Skin: She is not diaphoretic.   Psychiatric: She has a normal mood and affect. Her behavior is normal. Judgment and thought content normal.   Vitals reviewed.      ASSESSMENT:  1. Essential hypertension    2. Hypothyroidism due to acquired atrophy of thyroid    3. Obesity (BMI 30.0-34.9)    4. Need for influenza vaccination        PLAN:  Sophia  was seen today for hypertension and follow-up.    Diagnoses and all orders for this visit:    Essential hypertension    Hypothyroidism due to acquired atrophy of thyroid  -     TSH; Future    Obesity (BMI 30.0-34.9)    Need for influenza vaccination  -     Influenza - High Dose (65+) (PF) (IM)       Patient advised to call for results.  Continue current medications, follow low sodium, low cholesterol, low carb diet, daily walks.  Keep follow up with specialists.  Reassurance regarding subtle size change of thighs as without suspicious signs or symptoms but encouraged patient to closely monitor and notify me if problems.  Follow up in about 6 months (around 3/9/2020) for physical.

## 2019-09-10 ENCOUNTER — PATIENT MESSAGE (OUTPATIENT)
Dept: FAMILY MEDICINE | Facility: CLINIC | Age: 64
End: 2019-09-10

## 2019-09-10 DIAGNOSIS — E03.4 HYPOTHYROIDISM DUE TO ACQUIRED ATROPHY OF THYROID: Primary | Chronic | ICD-10-CM

## 2019-09-10 RX ORDER — ALLOPURINOL 100 MG/1
TABLET ORAL
Qty: 90 TABLET | Refills: 1 | Status: SHIPPED | OUTPATIENT
Start: 2019-09-10 | End: 2019-11-08 | Stop reason: SDUPTHER

## 2019-09-10 RX ORDER — LEVOTHYROXINE SODIUM 88 UG/1
TABLET ORAL
Qty: 90 TABLET | Refills: 1 | Status: SHIPPED | OUTPATIENT
Start: 2019-09-10 | End: 2020-03-02

## 2019-09-10 RX ORDER — HYDROCHLOROTHIAZIDE 12.5 MG/1
CAPSULE ORAL
Qty: 90 CAPSULE | Refills: 1 | Status: SHIPPED | OUTPATIENT
Start: 2019-09-10 | End: 2019-11-08 | Stop reason: SDUPTHER

## 2019-09-16 RX ORDER — BRIMONIDINE TARTRATE, TIMOLOL MALEATE 2; 5 MG/ML; MG/ML
SOLUTION/ DROPS OPHTHALMIC
Qty: 10 ML | Refills: 12 | Status: SHIPPED | OUTPATIENT
Start: 2019-09-16 | End: 2020-12-09

## 2019-09-17 DIAGNOSIS — I10 ESSENTIAL HYPERTENSION: Chronic | ICD-10-CM

## 2019-09-17 DIAGNOSIS — R00.2 PALPITATIONS: ICD-10-CM

## 2019-09-17 RX ORDER — ATENOLOL 50 MG/1
TABLET ORAL
Qty: 90 TABLET | Refills: 1 | Status: SHIPPED | OUTPATIENT
Start: 2019-09-17 | End: 2020-03-18 | Stop reason: SDUPTHER

## 2019-09-24 ENCOUNTER — TELEPHONE (OUTPATIENT)
Dept: FAMILY MEDICINE | Facility: CLINIC | Age: 64
End: 2019-09-24

## 2019-09-24 DIAGNOSIS — R00.2 PALPITATIONS: ICD-10-CM

## 2019-09-24 DIAGNOSIS — I10 ESSENTIAL HYPERTENSION: Chronic | ICD-10-CM

## 2019-09-24 RX ORDER — ATENOLOL 50 MG/1
TABLET ORAL
Qty: 90 TABLET | Refills: 1 | Status: SHIPPED | OUTPATIENT
Start: 2019-09-24 | End: 2020-01-28 | Stop reason: SDUPTHER

## 2019-09-24 NOTE — TELEPHONE ENCOUNTER
----- Message from Carlie Lima sent at 9/24/2019 10:28 AM CDT -----  Contact: pt   Type:  RX Refill Request    Who Called:  Pt   Refill or New Rx:refill   RX Name and Strength:atenolol 50 mg   How is the patient currently taking it? (ex. 1XDay):once daily   Is this a 30 day or 90 day RX: 90 days   Preferred Pharmacy with phone number:[facility]   Local or Mail Order: local    Ordering Provider:Dr Lance   Would the patient rather a call back or a response via [facility]? [facility]   Best Call Back Number:   Additional Information:     [facility] #[id number] - [address] & [address] [address]  Phone: [phone] Fax: [phone]

## 2019-10-07 ENCOUNTER — APPOINTMENT (OUTPATIENT)
Dept: OPHTHALMOLOGY | Facility: CLINIC | Age: 64
End: 2019-10-07
Payer: MEDICARE

## 2019-10-07 ENCOUNTER — OFFICE VISIT (OUTPATIENT)
Dept: OPHTHALMOLOGY | Facility: CLINIC | Age: 64
End: 2019-10-07
Payer: MEDICARE

## 2019-10-07 DIAGNOSIS — Z96.1 PSEUDOPHAKIA OF BOTH EYES: ICD-10-CM

## 2019-10-07 DIAGNOSIS — H40.1231 LOW-TENSION GLAUCOMA OF BOTH EYES, MILD STAGE: Primary | ICD-10-CM

## 2019-10-07 PROCEDURE — 92014 COMPRE OPH EXAM EST PT 1/>: CPT | Mod: S$PBB,,, | Performed by: OPHTHALMOLOGY

## 2019-10-07 PROCEDURE — 99212 OFFICE O/P EST SF 10 MIN: CPT | Mod: PBBFAC | Performed by: OPHTHALMOLOGY

## 2019-10-07 PROCEDURE — 99999 PR PBB SHADOW E&M-EST. PATIENT-LVL II: ICD-10-PCS | Mod: PBBFAC,,, | Performed by: OPHTHALMOLOGY

## 2019-10-07 PROCEDURE — 92083 EXTENDED VISUAL FIELD XM: CPT | Mod: PBBFAC | Performed by: OPHTHALMOLOGY

## 2019-10-07 PROCEDURE — 92250 COLOR FUNDUS PHOTOGRAPHY - OU - BOTH EYES: ICD-10-PCS | Mod: 26,S$PBB,, | Performed by: OPHTHALMOLOGY

## 2019-10-07 PROCEDURE — 92014 PR EYE EXAM, EST PATIENT,COMPREHESV: ICD-10-PCS | Mod: S$PBB,,, | Performed by: OPHTHALMOLOGY

## 2019-10-07 PROCEDURE — 92250 FUNDUS PHOTOGRAPHY W/I&R: CPT | Mod: PBBFAC | Performed by: OPHTHALMOLOGY

## 2019-10-07 PROCEDURE — 99999 PR PBB SHADOW E&M-EST. PATIENT-LVL II: CPT | Mod: PBBFAC,,, | Performed by: OPHTHALMOLOGY

## 2019-10-07 PROCEDURE — 92083 HUMPHREY VISUAL FIELD - OU - BOTH EYES: ICD-10-PCS | Mod: 26,S$PBB,, | Performed by: OPHTHALMOLOGY

## 2019-10-07 NOTE — PROGRESS NOTES
SUBJECTIVE:   Sophia Hope is a 64 y.o. female   Uncorrected distance visual acuity was 20/30 in the right eye and 20/30 in the left eye.   Chief Complaint   Patient presents with    Glaucoma     4 month HVF, SDP and DOA Combigan qAM OD and Xalatan QHS OU        HPI:  HPI     Glaucoma      Additional comments: 4 month HVF, SDP and DOA Combigan qAM OD and   Xalatan QHS OU              Comments     Last seen by [facility] on 6/7/19 for IOP check with gOCT  Vision stable  No pain or discomfort   Using drops as directed  1. Mild LTG No FHx (init 17/17=21/21 adjusted for thin K's)  Goal <14  CCT /  2. PCIOL I-Stent (inject) OS +20.5 SN60WF (distance)12/19/2018  PCIOL / I-Stent OD +20.5 SN60WF (distance) 7-25-18  3. Ptosis    AT's prn OU    Combigan QAM OD  Xalatan QHS OU          Last edited by Leonor Hensley, PCT on 10/7/2019  8:12 AM. (History)          Assessment /Plan :  1. Low-tension glaucoma of both eyes, mild stage Doing well, IOP within acceptable range relative to target IOP and no evidence of progression. Continue current treatment. Reviewed importance of continued compliance with treatment and follow up.     2. Pseudophakia of both eyes -stable        Return to clinic in 3-4 months  or as needed.  With IOP Check and GOCT

## 2019-10-08 ENCOUNTER — HOSPITAL ENCOUNTER (OUTPATIENT)
Dept: RADIOLOGY | Facility: HOSPITAL | Age: 64
Discharge: HOME | End: 2019-10-08
Attending: FAMILY MEDICINE
Payer: MEDICARE

## 2019-10-08 VITALS — WEIGHT: 206.81 LBS | BODY MASS INDEX: 33.24 KG/M2 | HEIGHT: 66 IN

## 2019-10-08 DIAGNOSIS — R92.8 ABNORMAL MAMMOGRAM: ICD-10-CM

## 2019-10-08 PROCEDURE — 76642 ULTRASOUND BREAST LIMITED: CPT | Mod: 26,RT,, | Performed by: RADIOLOGY

## 2019-10-08 PROCEDURE — 76642 ULTRASOUND BREAST LIMITED: CPT | Mod: TC,RT

## 2019-10-08 PROCEDURE — 76642 US BREAST RIGHT LIMITED: ICD-10-PCS | Mod: 26,RT,, | Performed by: RADIOLOGY

## 2019-10-08 PROCEDURE — 77065 DX MAMMO INCL CAD UNI: CPT | Mod: 26,,, | Performed by: RADIOLOGY

## 2019-10-08 PROCEDURE — 77065 DX MAMMO INCL CAD UNI: CPT | Mod: TC

## 2019-10-08 PROCEDURE — 77061 BREAST TOMOSYNTHESIS UNI: CPT | Mod: 26,,, | Performed by: RADIOLOGY

## 2019-10-08 PROCEDURE — 77065 MAMMO DIGITAL DIAGNOSTIC RIGHT WITH TOMOSYNTHESIS_CAD: ICD-10-PCS | Mod: 26,,, | Performed by: RADIOLOGY

## 2019-10-08 PROCEDURE — 77061 MAMMO DIGITAL DIAGNOSTIC RIGHT WITH TOMOSYNTHESIS_CAD: ICD-10-PCS | Mod: 26,,, | Performed by: RADIOLOGY

## 2019-10-18 RX ORDER — IRBESARTAN 300 MG/1
300 TABLET ORAL DAILY
Qty: 30 TABLET | Refills: 5 | Status: SHIPPED | OUTPATIENT
Start: 2019-10-18 | End: 2020-04-14

## 2019-10-18 NOTE — TELEPHONE ENCOUNTER
----- Message from Cris Hensley sent at 10/18/2019  4:03 PM CDT -----  Contact: pt  Patient called to see if medication (irbesartan (AVAPRO) 300 MG tablet )has been called in at [facility]    She can be reached at [phone]    Thanks,  Cris Hensley

## 2019-11-08 RX ORDER — HYDROCHLOROTHIAZIDE 12.5 MG/1
CAPSULE ORAL
Qty: 30 CAPSULE | Refills: 5 | Status: SHIPPED | OUTPATIENT
Start: 2019-11-08 | End: 2019-11-11 | Stop reason: SDUPTHER

## 2019-11-08 RX ORDER — ALLOPURINOL 100 MG/1
TABLET ORAL
Qty: 30 TABLET | Refills: 5 | Status: SHIPPED | OUTPATIENT
Start: 2019-11-08 | End: 2019-11-11 | Stop reason: SDUPTHER

## 2019-11-08 NOTE — TELEPHONE ENCOUNTER
----- Message from Alex Sherman sent at 11/8/2019  9:17 AM CST -----  Contact: PT   Type:  RX Refill Request    Who Called: Pt   Refill or New Rx: Refill  RX Name and Strength:1. Hydrochlorothiazide 12.5 mg, 2. Allopurinol 100 mg   How is the patient currently taking it? (ex. 1XDay):1-2 . Once daily   Is this a 30 day or 90 day RX:90 day   Preferred Pharmacy with phone number:  [facility] #[id number] [address] [address]  Phone: [phone] Fax: [phone]    Local or Mail Order: local   Ordering Provider: kylie   Would the patient rather a call back or a response via [facility]? Call back   Best Call Back Number:  [phone]  Additional Information: n/a

## 2019-11-11 NOTE — TELEPHONE ENCOUNTER
----- Message from Jovanna Gage sent at 11/11/2019  2:27 PM CST -----  Contact: self/[phone]  Type:  RX Refill Request    Who Called: Sophia Hope    Refill or New Rx:refill  RX Name and Strength:Allopurinol 100 mg and Hydrochlorothiazide 12.5 mg  How is the patient currently taking it? (ex. 1XDay):once a day  Is this a 30 day or 90 day RX:90  Preferred Pharmacy with phone number:      [facility] #[id number] - [place] [address] [facility] & [address] [address]  Phone: [phone] Fax: [phone]      Local or Mail Order:  localOrdering Provider:Dr Lance  Would the patient rather a call back or a response via [facility]? Call back  Best Call Back Number:[phone]  Additional Information: Patient states she has been trying to reach out to the staff for refill and so has the pharmacy. Please call back at [phone]. Thanks/ar

## 2019-11-12 ENCOUNTER — TELEPHONE (OUTPATIENT)
Dept: FAMILY MEDICINE | Facility: CLINIC | Age: 64
End: 2019-11-12

## 2019-11-12 RX ORDER — ALLOPURINOL 100 MG/1
TABLET ORAL
Qty: 30 TABLET | Refills: 5 | Status: SHIPPED | OUTPATIENT
Start: 2019-11-12 | End: 2020-05-04

## 2019-11-12 RX ORDER — ALLOPURINOL 100 MG/1
TABLET ORAL
Qty: 30 TABLET | Refills: 5 | Status: SHIPPED | OUTPATIENT
Start: 2019-11-12 | End: 2019-11-12 | Stop reason: SDUPTHER

## 2019-11-12 RX ORDER — HYDROCHLOROTHIAZIDE 12.5 MG/1
CAPSULE ORAL
Qty: 30 CAPSULE | Refills: 5 | Status: SHIPPED | OUTPATIENT
Start: 2019-11-12 | End: 2020-05-04

## 2019-11-12 RX ORDER — HYDROCHLOROTHIAZIDE 12.5 MG/1
CAPSULE ORAL
Qty: 30 CAPSULE | Refills: 5 | Status: SHIPPED | OUTPATIENT
Start: 2019-11-12 | End: 2019-11-12 | Stop reason: SDUPTHER

## 2019-11-12 NOTE — TELEPHONE ENCOUNTER
----- Message from Maryellen Michael sent at 11/12/2019  1:42 PM CST -----  Contact: Pt   Pt is calling regarding requesting to have nurse call pt back . Pt states that all is concerning  the scripts the was denied, Pt states that she is completely  out of these medications. Pt would like to know why the script was denied  [phone]        .Thank You  Maryellen Michael

## 2019-11-12 NOTE — TELEPHONE ENCOUNTER
Patient informed allopurinol (ZYLOPRIM) 100 MG tablet and hydroCHLOROthiazide (MICROZIDE) 12.5 mg capsule was sent to pharmacy. Patient voiced understanding.

## 2019-12-12 ENCOUNTER — HOSPITAL ENCOUNTER (OUTPATIENT)
Dept: RADIOLOGY | Facility: HOSPITAL | Age: 64
Discharge: HOME | End: 2019-12-12
Attending: INTERNAL MEDICINE
Payer: MEDICARE

## 2019-12-12 ENCOUNTER — OFFICE VISIT (OUTPATIENT)
Dept: FAMILY MEDICINE | Facility: CLINIC | Age: 64
End: 2019-12-12
Payer: MEDICARE

## 2019-12-12 VITALS
DIASTOLIC BLOOD PRESSURE: 87 MMHG | HEART RATE: 81 BPM | WEIGHT: 212.31 LBS | OXYGEN SATURATION: 98 % | HEIGHT: 66 IN | SYSTOLIC BLOOD PRESSURE: 156 MMHG | BODY MASS INDEX: 34.12 KG/M2 | TEMPERATURE: 98 F | RESPIRATION RATE: 16 BRPM

## 2019-12-12 DIAGNOSIS — G89.29 CHRONIC MIDLINE LOW BACK PAIN WITHOUT SCIATICA: Primary | ICD-10-CM

## 2019-12-12 DIAGNOSIS — M54.50 CHRONIC MIDLINE LOW BACK PAIN WITHOUT SCIATICA: Primary | ICD-10-CM

## 2019-12-12 DIAGNOSIS — R19.8 ABDOMINAL FULLNESS: ICD-10-CM

## 2019-12-12 DIAGNOSIS — G89.29 CHRONIC MIDLINE LOW BACK PAIN WITHOUT SCIATICA: ICD-10-CM

## 2019-12-12 DIAGNOSIS — M54.50 CHRONIC MIDLINE LOW BACK PAIN WITHOUT SCIATICA: ICD-10-CM

## 2019-12-12 PROCEDURE — 99999 PR PBB SHADOW E&M-EST. PATIENT-LVL V: ICD-10-PCS | Mod: PBBFAC,,, | Performed by: INTERNAL MEDICINE

## 2019-12-12 PROCEDURE — 99213 OFFICE O/P EST LOW 20 MIN: CPT | Mod: S$PBB,,, | Performed by: INTERNAL MEDICINE

## 2019-12-12 PROCEDURE — 99213 PR OFFICE/OUTPT VISIT, EST, LEVL III, 20-29 MIN: ICD-10-PCS | Mod: S$PBB,,, | Performed by: INTERNAL MEDICINE

## 2019-12-12 PROCEDURE — 74019 RADEX ABDOMEN 2 VIEWS: CPT | Mod: 26,,, | Performed by: RADIOLOGY

## 2019-12-12 PROCEDURE — 99999 PR PBB SHADOW E&M-EST. PATIENT-LVL V: CPT | Mod: PBBFAC,,, | Performed by: INTERNAL MEDICINE

## 2019-12-12 PROCEDURE — 74019 XR ABDOMEN FLAT AND ERECT: ICD-10-PCS | Mod: 26,,, | Performed by: RADIOLOGY

## 2019-12-12 PROCEDURE — 74019 RADEX ABDOMEN 2 VIEWS: CPT | Mod: TC,FY,PO

## 2019-12-12 PROCEDURE — 99215 OFFICE O/P EST HI 40 MIN: CPT | Mod: PBBFAC,25,PO | Performed by: INTERNAL MEDICINE

## 2019-12-12 RX ORDER — MELOXICAM 15 MG/1
15 TABLET ORAL DAILY PRN
Qty: 30 TABLET | Refills: 0 | Status: SHIPPED | OUTPATIENT
Start: 2019-12-12 | End: 2019-12-12 | Stop reason: SDUPTHER

## 2019-12-12 RX ORDER — BACLOFEN 10 MG/1
10 TABLET ORAL NIGHTLY PRN
Qty: 30 TABLET | Refills: 0 | Status: SHIPPED | OUTPATIENT
Start: 2019-12-12 | End: 2020-01-10

## 2019-12-12 NOTE — PROGRESS NOTES
Subjective:       Patient ID: Sophia Hope is a 64 y.o. female.    Chief Complaint: Back Pain    Back Pain   The current episode started more than 1 month ago. The problem occurs daily. The problem has been waxing and waning since onset. The pain is present in the sacro-iliac. The quality of the pain is described as aching. The pain does not radiate. The pain is at a severity of 3/10. The pain is moderate. The pain is worse during the day. Stiffness is present all day. Pertinent negatives include no abdominal pain, bladder incontinence, chest pain, dysuria, fever, headaches, leg pain, paresis, paresthesias, perianal numbness, tingling, weakness or weight loss. She has tried nothing for the symptoms.     Past Medical History:   Diagnosis Date    Arthritis     Benign colon polyp     Cataract     Diverticulosis     Glaucoma (increased eye pressure)     History of abnormal Pap smear     History of herpes zoster     Right flank 4/30/12    Hypertension     Hypothyroidism     Osteopenia     Postmenopausal      Past Surgical History:   Procedure Laterality Date    BTL      CATARACT EXTRACTION W/  INTRAOCULAR LENS IMPLANT Left 12/19/2018    w/ ISTENT(INJ)    CATARACT EXTRACTION W/  INTRAOCULAR LENS IMPLANT Right 07/25/2018    W/ ISTENT    CERVIX LESION DESTRUCTION      Abnormal PAP    COLONOSCOPY N/A 5/29/2019    Procedure: COLONOSCOPY;  Surgeon: To Clay MD;  Location: [facility];  Service: Endoscopy;  Laterality: N/A;    hysteroscopic ablation      I-STENT Right 07/25/2018    DR. HENRY    PCIOL Right 07/25/2018    DR. HENRY    [place] DILATION/CURETTAGE,DIAGNOSTIC      D & C    TONSILLECTOMY       Family History   Problem Relation Age of Onset    Cancer Mother         stomach cancer    Cataracts Mother     Hypertension Sister     Cancer Sister         liver cancer    Hypertension Sister     Heart disease Brother         MI/CAD    No Known Problems Daughter     No Known Problems Son      Diabetes Neg Hx     Stroke Neg Hx     Blindness Neg Hx     Glaucoma Neg Hx     Macular degeneration Neg Hx     Retinal detachment Neg Hx     Strabismus Neg Hx     Thyroid disease Neg Hx      Social History     Socioeconomic History    Marital status:      Spouse name: Not on file    Number of children: 2    Years of education: Not on file    Highest education level: Not on file   Occupational History    Occupation: Retired   Social Needs    Financial resource strain: Not very hard    Food insecurity:     Worry: Never true     Inability: Never true    Transportation needs:     Medical: No     Non-medical: No   Tobacco Use    Smoking status: Former Smoker     Years: 5.00     Types: Cigarettes     Last attempt to quit: 1983     Years since quittin.5    Smokeless tobacco: Never Used   Substance and Sexual Activity    Alcohol use: No     Alcohol/week: 0.0 standard drinks     Frequency: Never     Binge frequency: Never    Drug use: No    Sexual activity: Never   Lifestyle    Physical activity:     Days per week: 3 days     Minutes per session: 40 min    Stress: Not at all   Relationships    Social connections:     Talks on phone: More than three times a week     Gets together: More than three times a week     Attends [employer] service: More than 4 times per year     Active member of club or organization: Yes     Attends meetings of clubs or organizations: 1 to 4 times per year     Relationship status:    Other Topics Concern    Not on file   Social History Narrative    She wears seatbelt.     Review of Systems   Constitutional: Negative for chills, fever and weight loss.   Respiratory: Negative for apnea, cough, wheezing and stridor.    Cardiovascular: Negative for chest pain.   Gastrointestinal: Negative for abdominal pain, blood in stool, constipation, diarrhea, nausea and vomiting.        Abdominal fullness-   Genitourinary: Negative for bladder incontinence and dysuria.    Musculoskeletal: Positive for back pain.   Neurological: Negative for dizziness, tingling, weakness, headaches and paresthesias.       Objective:      Physical Exam   Constitutional: She is oriented to person, place, and time. She appears well-developed and well-nourished.   Cardiovascular: Normal rate, regular rhythm, normal heart sounds and intact distal pulses.   Pulmonary/Chest: Effort normal and breath sounds normal.   Abdominal: Soft. Bowel sounds are normal. She exhibits no distension and no mass. There is no tenderness. There is no guarding.   Neurological: She is alert and oriented to person, place, and time.   Nursing note and vitals reviewed.      CMP  Sodium   Date Value Ref Range Status   03/07/2019 139 136 - 145 mmol/L Final     Potassium   Date Value Ref Range Status   03/07/2019 3.9 3.5 - 5.1 mmol/L Final     Chloride   Date Value Ref Range Status   03/07/2019 104 95 - 110 mmol/L Final     CO2   Date Value Ref Range Status   03/07/2019 27 23 - 29 mmol/L Final     Glucose   Date Value Ref Range Status   03/07/2019 91 70 - 110 mg/dL Final     BUN, Bld   Date Value Ref Range Status   03/07/2019 16 8 - 23 mg/dL Final     Creatinine   Date Value Ref Range Status   03/07/2019 1.0 0.5 - 1.4 mg/dL Final     Calcium   Date Value Ref Range Status   03/07/2019 9.6 8.7 - 10.5 mg/dL Final     Total Protein   Date Value Ref Range Status   03/07/2019 6.8 6.0 - 8.4 g/dL Final     Albumin   Date Value Ref Range Status   03/07/2019 3.3 (L) 3.5 - 5.2 g/dL Final     Total Bilirubin   Date Value Ref Range Status   03/07/2019 0.8 0.1 - 1.0 mg/dL Final     Comment:     For infants and newborns, interpretation of results should be based  on gestational age, weight and in agreement with clinical  observations.  Premature Infant recommended reference ranges:  Up to 24 hours.............<8.0 mg/dL  Up to 48 hours............<12.0 mg/dL  3-5 days..................<15.0 mg/dL  6-29 days.................<15.0 mg/dL        Alkaline Phosphatase   Date Value Ref Range Status   03/07/2019 71 55 - 135 U/L Final     AST   Date Value Ref Range Status   03/07/2019 22 10 - 40 U/L Final     ALT   Date Value Ref Range Status   03/07/2019 15 10 - 44 U/L Final     Anion Gap   Date Value Ref Range Status   03/07/2019 8 8 - 16 mmol/L Final     eGFR if    Date Value Ref Range Status   03/07/2019 >60.0 >60 mL/min/1.73 m^2 Final     eGFR if non    Date Value Ref Range Status   03/07/2019 59.3 (A) >60 mL/min/1.73 m^2 Final     Comment:     Calculation used to obtain the estimated glomerular filtration  rate (eGFR) is the CKD-EPI equation.        Lab Results   Component Value Date    WBC 5.08 03/07/2019    HGB 12.7 03/07/2019    HCT 40.4 03/07/2019     (H) 03/07/2019     03/07/2019     Lab Results   Component Value Date    CHOL 177 03/07/2019     Lab Results   Component Value Date    HDL 36 (L) 03/07/2019     Lab Results   Component Value Date    LDLCALC 115.8 03/07/2019     Lab Results   Component Value Date    TRIG 126 03/07/2019     Lab Results   Component Value Date    CHOLHDL 20.3 03/07/2019     Lab Results   Component Value Date    TSH 0.310 (L) 09/09/2019     Lab Results   Component Value Date    HGBA1C 5.2 11/23/2015     Assessment:       1. Chronic midline low back pain without sciatica    2. Abdominal fullness        Plan:   Chronic midline low back pain without sciatica---------------call if persists----------  -     meloxicam (MOBIC) 15 MG tablet; Take 1 tablet (15 mg total) by mouth daily as needed.  Dispense: 30 tablet; Refill: 0  -     baclofen (LIORESAL) 10 MG tablet; Take 1 tablet (10 mg total) by mouth nightly as needed.  Dispense: 30 tablet; Refill: 0    Abdominal fullness-------call if persists-----------------  -     X-Ray Abdomen Flat And Erect; Future; Expected date: 12/12/2019    F/u pcp----------  Answers for HPI/ROS submitted by the patient on 12/11/2019   Back pain  genital  pain: No

## 2019-12-13 RX ORDER — MELOXICAM 15 MG/1
TABLET ORAL
Qty: 90 TABLET | Refills: 0 | Status: SHIPPED | OUTPATIENT
Start: 2019-12-13 | End: 2021-04-16 | Stop reason: SDUPTHER

## 2020-01-10 DIAGNOSIS — G89.29 CHRONIC MIDLINE LOW BACK PAIN WITHOUT SCIATICA: ICD-10-CM

## 2020-01-10 DIAGNOSIS — M54.50 CHRONIC MIDLINE LOW BACK PAIN WITHOUT SCIATICA: ICD-10-CM

## 2020-01-10 RX ORDER — BACLOFEN 10 MG/1
TABLET ORAL
Qty: 30 TABLET | Refills: 0 | Status: SHIPPED | OUTPATIENT
Start: 2020-01-10 | End: 2021-09-09 | Stop reason: SDUPTHER

## 2020-01-28 ENCOUNTER — OFFICE VISIT (OUTPATIENT)
Dept: FAMILY MEDICINE | Facility: CLINIC | Age: 65
End: 2020-01-28
Payer: MEDICARE

## 2020-01-28 VITALS
TEMPERATURE: 98 F | DIASTOLIC BLOOD PRESSURE: 86 MMHG | HEIGHT: 66 IN | OXYGEN SATURATION: 97 % | BODY MASS INDEX: 33.94 KG/M2 | WEIGHT: 211.19 LBS | HEART RATE: 74 BPM | SYSTOLIC BLOOD PRESSURE: 134 MMHG

## 2020-01-28 DIAGNOSIS — R05.9 COUGH: Primary | ICD-10-CM

## 2020-01-28 DIAGNOSIS — I10 ESSENTIAL HYPERTENSION: Chronic | ICD-10-CM

## 2020-01-28 PROBLEM — R07.89 ATYPICAL CHEST PAIN: Status: RESOLVED | Noted: 2018-07-17 | Resolved: 2020-01-28

## 2020-01-28 PROBLEM — R94.31 ABNORMAL ECG: Status: RESOLVED | Noted: 2018-07-17 | Resolved: 2020-01-28

## 2020-01-28 PROBLEM — Z12.11 SPECIAL SCREENING FOR MALIGNANT NEOPLASMS, COLON: Status: RESOLVED | Noted: 2019-05-29 | Resolved: 2020-01-28

## 2020-01-28 PROBLEM — H26.9 CORTICAL CATARACT: Status: RESOLVED | Noted: 2018-05-25 | Resolved: 2020-01-28

## 2020-01-28 PROCEDURE — 1126F AMNT PAIN NOTED NONE PRSNT: CPT | Mod: ,,, | Performed by: FAMILY MEDICINE

## 2020-01-28 PROCEDURE — 99999 PR PBB SHADOW E&M-EST. PATIENT-LVL III: ICD-10-PCS | Mod: PBBFAC,,, | Performed by: FAMILY MEDICINE

## 2020-01-28 PROCEDURE — 1159F PR MEDICATION LIST DOCUMENTED IN MEDICAL RECORD: ICD-10-PCS | Mod: ,,, | Performed by: FAMILY MEDICINE

## 2020-01-28 PROCEDURE — 99214 OFFICE O/P EST MOD 30 MIN: CPT | Mod: S$PBB,,, | Performed by: FAMILY MEDICINE

## 2020-01-28 PROCEDURE — 1126F PR PAIN SEVERITY QUANTIFIED, NO PAIN PRESENT: ICD-10-PCS | Mod: ,,, | Performed by: FAMILY MEDICINE

## 2020-01-28 PROCEDURE — 99214 PR OFFICE/OUTPT VISIT, EST, LEVL IV, 30-39 MIN: ICD-10-PCS | Mod: S$PBB,,, | Performed by: FAMILY MEDICINE

## 2020-01-28 PROCEDURE — 1159F MED LIST DOCD IN RCRD: CPT | Mod: ,,, | Performed by: FAMILY MEDICINE

## 2020-01-28 PROCEDURE — 99999 PR PBB SHADOW E&M-EST. PATIENT-LVL III: CPT | Mod: PBBFAC,,, | Performed by: FAMILY MEDICINE

## 2020-01-28 PROCEDURE — 99213 OFFICE O/P EST LOW 20 MIN: CPT | Mod: PBBFAC,PO | Performed by: FAMILY MEDICINE

## 2020-01-28 RX ORDER — METHYLPREDNISOLONE 4 MG/1
TABLET ORAL
Qty: 1 PACKAGE | Refills: 0 | Status: SHIPPED | OUTPATIENT
Start: 2020-01-28 | End: 2020-02-13

## 2020-01-28 NOTE — PROGRESS NOTES
CHIEF COMPLAINT:  This is a 64-year-old female complaining of persistent cough.    SUBJECTIVE:  Patient complains of persistent cough for over 2 weeks which is dry in nature.  She denies mucus production or postnasal drip.  Cough is triggered by talking.  She has had to be more frequently lately due to training other in please.  Patient denies nasal congestion, runny nose, sore throat, ear pain, chest congestion, shortness of breath or wheezing.  She has been using Dimetapp, cough  lozenges and drinking tea with lemon.  Positive ill contacts.  Patient received influenza vaccine.  She is a nonsmoker and is not exposed to passive smoke.  Patient reports that she began coughing after being given a prescription for baclofen.  She took medication for only a few doses before discontinuing.  Patient has hypertension for which she takes irbesartan 300 mg daily.  She takes no other medications which include cough as a side effect.  Patient denies exposure to active TB.    ROS:  GENERAL: Patient denies fever, chills, night sweats.  Patient denies weight gain or loss. Patient denies anorexia, fatigue, weakness or swollen glands.  SKIN: Patient denies rash.  HEENT: Patient denies sore throat, ear pain, hearing loss, nasal congestion, or runny nose. Patient denies visual disturbance, eye irritation or discharge.  LUNGS: Patient denies wheeze or hemoptysis.positive for cough.    CARDIOVASCULAR: Patient denies chest pain, shortness of breath, palpitations, syncope or lower extremity edema.  GI: Patient denies abdominal pain, nausea, vomiting, diarrhea, constipation, blood in stool or melena.  MUSCULOSKELETAL: Patient denies joint pain, swelling, redness or warmth.  NEUROLOGIC: Patient denies headache, vertigo, paresthesias, weakness in limb, or abnormality of gait.      OBJECTIVE:   GENERAL: Well-developed well-nourished obese black female alert and oriented x3 in no acute distress.  Memory, judgment and cognition without deficit.   No audible wheezing.  Dry  sounding cough.    SKIN: Clear without rash.  Normal color and tone.  HEENT: Eyes: Clear conjunctivae.  No scleral icterus.  Ears: Clear canals.  Clear TMs.  Nose: Without congestion.  Pharynx: Without injection or exudates.  NECK: Supple, normal range of motion.  No lymphadenopathy.  No masses or enlarged thyroid.  No JVD.  Carotids 2+ and equal.  No bruits.  LUNGS: Clear to auscultation.  Normal respiratory effort.  CARDIOVASCULAR: Regular rhythm, normal S1, S2 without murmur, gallop or rub.  EXTREMITIES: Without cyanosis, clubbing or edema.  Distal pulses 2+ and equal.  Normal range of motion in all extremities.  No joint effusion, erythema or warmth.  NEUROLOGIC:   Gait without abnormality.  No tremor.      ASSESSMENT:  1. Cough    2. Essential hypertension      PLAN:   1.  Medrol Dosepak.  2.  Keep well hydrated.  3.  Voice rest.  4.  Monitor blood pressure.  Report readings greater than or equal 140/90.  5.  Follow-up if no improvement or worsening symptoms.    This note is generated with speech recognition software and is subject to transcription error and sound alike phrases that may be missed by proofreading.

## 2020-02-06 ENCOUNTER — PATIENT MESSAGE (OUTPATIENT)
Dept: FAMILY MEDICINE | Facility: CLINIC | Age: 65
End: 2020-02-06

## 2020-02-07 ENCOUNTER — PATIENT MESSAGE (OUTPATIENT)
Dept: FAMILY MEDICINE | Facility: CLINIC | Age: 65
End: 2020-02-07

## 2020-02-10 ENCOUNTER — OFFICE VISIT (OUTPATIENT)
Dept: OPHTHALMOLOGY | Facility: CLINIC | Age: 65
End: 2020-02-10
Payer: MEDICARE

## 2020-02-10 DIAGNOSIS — Z96.1 PSEUDOPHAKIA OF BOTH EYES: ICD-10-CM

## 2020-02-10 DIAGNOSIS — H40.1231 LOW-TENSION GLAUCOMA OF BOTH EYES, MILD STAGE: Primary | ICD-10-CM

## 2020-02-10 PROCEDURE — 92012 INTRM OPH EXAM EST PATIENT: CPT | Mod: S$PBB,,, | Performed by: OPHTHALMOLOGY

## 2020-02-10 PROCEDURE — 99999 PR PBB SHADOW E&M-EST. PATIENT-LVL II: ICD-10-PCS | Mod: PBBFAC,,, | Performed by: OPHTHALMOLOGY

## 2020-02-10 PROCEDURE — 99999 PR PBB SHADOW E&M-EST. PATIENT-LVL II: CPT | Mod: PBBFAC,,, | Performed by: OPHTHALMOLOGY

## 2020-02-10 PROCEDURE — 99212 OFFICE O/P EST SF 10 MIN: CPT | Mod: PBBFAC,25 | Performed by: OPHTHALMOLOGY

## 2020-02-10 PROCEDURE — 92133 CPTRZD OPH DX IMG PST SGM ON: CPT | Mod: PBBFAC | Performed by: OPHTHALMOLOGY

## 2020-02-10 PROCEDURE — 92012 PR EYE EXAM, EST PATIENT,INTERMED: ICD-10-PCS | Mod: S$PBB,,, | Performed by: OPHTHALMOLOGY

## 2020-02-10 PROCEDURE — 92133 POSTERIOR SEGMENT OCT OPTIC NERVE(OCULAR COHERENCE TOMOGRAPHY) - OU - BOTH EYES: ICD-10-PCS | Mod: 26,S$PBB,, | Performed by: OPHTHALMOLOGY

## 2020-02-10 NOTE — PROGRESS NOTES
SUBJECTIVE  Sophia JACK Hope is 64 y.o. female  Uncorrected distance visual acuity was 20/30 -1 in the right eye and 20/30 -2 in the left eye.   Chief Complaint   Patient presents with    Glaucoma          HPI     Pt here for 4 month iop check with goct       1. Mild LTG No FHx (init 17/17=21/21 adjusted for thin K's)  Goal <14  CCT /  2. PCIOL I-Stent (inject) OS +20.5 SN60WF (distance)12/19/2018  PCIOL / I-Stent OD +20.5 SN60WF (distance) 7-25-18  3. Ptosis    AT's prn OU    Combigan QAM OD  Xalatan QHS OU    Last edited by Arcelia Snyder MA on 2/10/2020  7:53 AM. (History)         Assessment /Plan :  1. Low-tension glaucoma of both eyes, mild stage Doing well, IOP within acceptable range relative to target IOP and no evidence of progression. Continue current treatment. Reviewed importance of continued compliance with treatment and follow up.     2. Pseudophakia of both eyes  -- Condition stable, no therapeutic change required. Monitoring routinely.       Return to clinic in 4 months  or as needed.  With IOP Check

## 2020-02-13 ENCOUNTER — OFFICE VISIT (OUTPATIENT)
Dept: FAMILY MEDICINE | Facility: CLINIC | Age: 65
End: 2020-02-13
Payer: MEDICARE

## 2020-02-13 ENCOUNTER — HOSPITAL ENCOUNTER (OUTPATIENT)
Dept: RADIOLOGY | Facility: HOSPITAL | Age: 65
Discharge: HOME | End: 2020-02-13
Attending: FAMILY MEDICINE
Payer: MEDICARE

## 2020-02-13 VITALS
HEIGHT: 66 IN | WEIGHT: 210.13 LBS | HEART RATE: 86 BPM | SYSTOLIC BLOOD PRESSURE: 134 MMHG | TEMPERATURE: 98 F | DIASTOLIC BLOOD PRESSURE: 86 MMHG | BODY MASS INDEX: 33.77 KG/M2

## 2020-02-13 DIAGNOSIS — R05.3 CHRONIC COUGH: Primary | ICD-10-CM

## 2020-02-13 DIAGNOSIS — I10 ESSENTIAL HYPERTENSION: Chronic | ICD-10-CM

## 2020-02-13 DIAGNOSIS — J30.2 SEASONAL ALLERGIC RHINITIS, UNSPECIFIED TRIGGER: ICD-10-CM

## 2020-02-13 DIAGNOSIS — R05.3 CHRONIC COUGH: ICD-10-CM

## 2020-02-13 PROCEDURE — 99999 PR PBB SHADOW E&M-EST. PATIENT-LVL III: CPT | Mod: PBBFAC,,, | Performed by: FAMILY MEDICINE

## 2020-02-13 PROCEDURE — 99214 OFFICE O/P EST MOD 30 MIN: CPT | Mod: S$PBB,,, | Performed by: FAMILY MEDICINE

## 2020-02-13 PROCEDURE — 71046 XR CHEST PA AND LATERAL: ICD-10-PCS | Mod: 26,,, | Performed by: RADIOLOGY

## 2020-02-13 PROCEDURE — 71046 X-RAY EXAM CHEST 2 VIEWS: CPT | Mod: 26,,, | Performed by: RADIOLOGY

## 2020-02-13 PROCEDURE — 99999 PR PBB SHADOW E&M-EST. PATIENT-LVL III: ICD-10-PCS | Mod: PBBFAC,,, | Performed by: FAMILY MEDICINE

## 2020-02-13 PROCEDURE — 99213 OFFICE O/P EST LOW 20 MIN: CPT | Mod: PBBFAC,25,PO | Performed by: FAMILY MEDICINE

## 2020-02-13 PROCEDURE — 99214 PR OFFICE/OUTPT VISIT, EST, LEVL IV, 30-39 MIN: ICD-10-PCS | Mod: S$PBB,,, | Performed by: FAMILY MEDICINE

## 2020-02-13 PROCEDURE — 71046 X-RAY EXAM CHEST 2 VIEWS: CPT | Mod: TC,FY,PO

## 2020-02-13 RX ORDER — MONTELUKAST SODIUM 10 MG/1
10 TABLET ORAL DAILY
Qty: 30 TABLET | Refills: 2 | Status: SHIPPED | OUTPATIENT
Start: 2020-02-13 | End: 2020-05-15

## 2020-02-13 NOTE — PROGRESS NOTES
Sophia JACK Hope    Chief Complaint   Patient presents with    Cough       History of Present Illness:   Ms. Hope comes in today with complaint of having occasional dry cough for least 6 weeks.  She denies having associated sore throat, hoarseness, shortness of breath, wheezing, fever, chills, fatigue, appetite change, significant weight changes, indigestion or heartburn, postnasal drip or nasal congestion.  However, she states for few days having running nose.  She states she takes cough drops without help.  She does not smoke.  She saw Dr. Worrell on January 28, 2020 and was prescribed steroid dose pack for treatment of cough with little help as she states she continues to have cough although reports cough is 60-75% better.    Otherwise, she denies having other associated symptoms.        Current Outpatient Medications   Medication Sig    allopurinol (ZYLOPRIM) 100 MG tablet TAKE 1 TABLET BY MOUTH ONCE DAILY    atenolol (TENORMIN) 100 MG tablet TAKE 1 TABLET(100 MG) BY MOUTH EVERY DAY    atenolol (TENORMIN) 50 MG tablet TAKE 1 TABLET(50 MG) BY MOUTH EVERY DAY    baclofen (LIORESAL) 10 MG tablet TAKE 1 TABLET(10 MG) BY MOUTH EVERY NIGHT AS NEEDED    calcium carbonate-vitamin D2 600 mg calcium- 200 unit Cap Take 1 tablet by mouth Twice daily.    COMBIGAN 0.2-0.5 % Drop INSTILL 1 DROP IN BOTH EYES TWICE DAILY    dextran 70-hypromellose (ARTIFICIAL TEARS) ophthalmic solution Place 1 drop into both eyes. BOTH EYES PRN     hydroCHLOROthiazide (MICROZIDE) 12.5 mg capsule TAKE 1 CAPSULE BY MOUTH ONCE DAILY    irbesartan (AVAPRO) 300 MG tablet Take 1 tablet (300 mg total) by mouth once daily.    latanoprost 0.005 % ophthalmic solution INSTILL 1 DROP IN BOTH EYES EVERY DAY AT BEDTIME    levothyroxine (SYNTHROID) 88 MCG tablet TAKE 1 TABLET BY MOUTH DAILY BEFORE BREAKFAST    meloxicam (MOBIC) 15 MG tablet TAKE 1 TABLET(15 MG) BY MOUTH DAILY AS NEEDED       Review of Systems   Constitutional: Negative for  activity change, appetite change, chills, fatigue and fever.   HENT: Positive for rhinorrhea. Negative for congestion, postnasal drip, sinus pressure, sinus pain, sneezing, sore throat and voice change.    Eyes:        See history of present illness examination.   Respiratory: Positive for cough. Negative for shortness of breath and wheezing.    Cardiovascular: Negative for chest pain, palpitations and leg swelling.   Gastrointestinal: Negative for abdominal pain, constipation, diarrhea, nausea and vomiting.        Negative for indigestion.   Endocrine: Negative for cold intolerance and heat intolerance.        See history of present illness examination.   Genitourinary: Negative for difficulty urinating.   Musculoskeletal: Negative for back pain.   Neurological: Negative for headaches.   Psychiatric/Behavioral: Negative for dysphoric mood and suicidal ideas. The patient is not nervous/anxious.         Negative for homicidal ideas.       Objective:  Physical Exam   Constitutional: She is oriented to person, place, and time. She appears well-developed and well-nourished. No distress.   HENT:   Head: Normocephalic and atraumatic.   Right Ear: External ear normal.   Left Ear: External ear normal.   Nose: Nose normal.   Mouth/Throat: Oropharynx is clear and moist. No oropharyngeal exudate.   Non tender sinuses. TM's shiny and clear. Nasal mucosa pale, congested without drainage noted.   Eyes: Pupils are equal, round, and reactive to light. Conjunctivae and EOM are normal. Right eye exhibits no discharge. Left eye exhibits no discharge.   Neck: Normal range of motion. Neck supple. No thyromegaly present.   Cardiovascular: Normal rate, regular rhythm, normal heart sounds and intact distal pulses.   No murmur heard.  Pulmonary/Chest: Effort normal and breath sounds normal. No respiratory distress. She has no wheezes.   Abdominal: Soft. Bowel sounds are normal. She exhibits no distension and no mass. There is no tenderness.  There is no rebound and no guarding.   Musculoskeletal: Normal range of motion. She exhibits no edema or tenderness.   She is ambulatory without problems.  Subtle difference in subcutaneous fat superior to right knee compared to left knee.   Lymphadenopathy:     She has no cervical adenopathy.   Neurological: She is alert and oriented to person, place, and time.   Skin: She is not diaphoretic.   Psychiatric: She has a normal mood and affect. Her behavior is normal. Judgment and thought content normal.   Vitals reviewed.      ASSESSMENT:  1. Chronic cough    2. Seasonal allergic rhinitis, unspecified trigger    3. Essential hypertension        PLAN:  Sophia was seen today for cough.    Diagnoses and all orders for this visit:    Chronic cough  -     X-Ray Chest PA And Lateral; Future    Seasonal allergic rhinitis, unspecified trigger  -     montelukast (SINGULAIR) 10 mg tablet; Take 1 tablet (10 mg total) by mouth once daily.    Essential hypertension       Patient advised to call for results.  Continue current medications, follow low sodium, low cholesterol, low carb diet, daily walks.  Add Singulair 10 mg daily; medication precautions discussed with patient.  Follow up if symptoms worsen or fail to improve. Keep 3/9/2020 scheduled physical with me at which time may consider adding anti-reflux medication and/or stopping ARB.

## 2020-02-20 DIAGNOSIS — R00.2 PALPITATIONS: ICD-10-CM

## 2020-02-20 DIAGNOSIS — I10 ESSENTIAL HYPERTENSION: Chronic | ICD-10-CM

## 2020-02-20 RX ORDER — ATENOLOL 100 MG/1
TABLET ORAL
Qty: 90 TABLET | Refills: 1 | Status: SHIPPED | OUTPATIENT
Start: 2020-02-20 | End: 2020-08-16

## 2020-03-02 DIAGNOSIS — E03.4 HYPOTHYROIDISM DUE TO ACQUIRED ATROPHY OF THYROID: Chronic | ICD-10-CM

## 2020-03-02 RX ORDER — LEVOTHYROXINE SODIUM 88 UG/1
TABLET ORAL
Qty: 90 TABLET | Refills: 0 | Status: SHIPPED | OUTPATIENT
Start: 2020-03-02 | End: 2020-05-31 | Stop reason: SDUPTHER

## 2020-03-09 ENCOUNTER — LAB VISIT (OUTPATIENT)
Dept: LAB | Facility: HOSPITAL | Age: 65
End: 2020-03-09
Payer: MEDICARE

## 2020-03-09 ENCOUNTER — OFFICE VISIT (OUTPATIENT)
Dept: FAMILY MEDICINE | Facility: CLINIC | Age: 65
End: 2020-03-09
Payer: MEDICARE

## 2020-03-09 VITALS
TEMPERATURE: 98 F | BODY MASS INDEX: 33.87 KG/M2 | HEIGHT: 66 IN | SYSTOLIC BLOOD PRESSURE: 131 MMHG | OXYGEN SATURATION: 95 % | DIASTOLIC BLOOD PRESSURE: 83 MMHG | WEIGHT: 210.75 LBS | HEART RATE: 68 BPM | RESPIRATION RATE: 18 BRPM

## 2020-03-09 DIAGNOSIS — Z23 NEED FOR PROPHYLACTIC VACCINATION AGAINST STREPTOCOCCUS PNEUMONIAE (PNEUMOCOCCUS): ICD-10-CM

## 2020-03-09 DIAGNOSIS — Z12.11 COLON CANCER SCREENING: ICD-10-CM

## 2020-03-09 DIAGNOSIS — E03.4 HYPOTHYROIDISM DUE TO ACQUIRED ATROPHY OF THYROID: Chronic | ICD-10-CM

## 2020-03-09 DIAGNOSIS — I10 ESSENTIAL HYPERTENSION: Chronic | ICD-10-CM

## 2020-03-09 DIAGNOSIS — M85.80 OSTEOPENIA, UNSPECIFIED LOCATION: Chronic | ICD-10-CM

## 2020-03-09 DIAGNOSIS — E66.9 OBESITY (BMI 30.0-34.9): ICD-10-CM

## 2020-03-09 DIAGNOSIS — H40.1231 LOW-TENSION GLAUCOMA OF BOTH EYES, MILD STAGE: ICD-10-CM

## 2020-03-09 DIAGNOSIS — Z86.010 HISTORY OF COLON POLYPS: ICD-10-CM

## 2020-03-09 DIAGNOSIS — Z78.0 POSTMENOPAUSAL: ICD-10-CM

## 2020-03-09 DIAGNOSIS — E79.0 HYPERURICEMIA: ICD-10-CM

## 2020-03-09 DIAGNOSIS — I10 ESSENTIAL HYPERTENSION: Primary | Chronic | ICD-10-CM

## 2020-03-09 DIAGNOSIS — Z12.31 VISIT FOR SCREENING MAMMOGRAM: ICD-10-CM

## 2020-03-09 LAB
ALBUMIN SERPL BCP-MCNC: 3.6 G/DL (ref 3.5–5.2)
ALP SERPL-CCNC: 77 U/L (ref 55–135)
ALT SERPL W/O P-5'-P-CCNC: 17 U/L (ref 10–44)
ANION GAP SERPL CALC-SCNC: 10 MMOL/L (ref 8–16)
AST SERPL-CCNC: 25 U/L (ref 10–40)
BASOPHILS # BLD AUTO: 0.05 K/UL (ref 0–0.2)
BASOPHILS NFR BLD: 0.8 % (ref 0–1.9)
BILIRUB SERPL-MCNC: 0.9 MG/DL (ref 0.1–1)
BILIRUB UR QL STRIP: NEGATIVE
BUN SERPL-MCNC: 19 MG/DL (ref 8–23)
CALCIUM SERPL-MCNC: 9.2 MG/DL (ref 8.7–10.5)
CHLORIDE SERPL-SCNC: 104 MMOL/L (ref 95–110)
CHOLEST SERPL-MCNC: 187 MG/DL (ref 120–199)
CHOLEST/HDLC SERPL: 5.1 {RATIO} (ref 2–5)
CLARITY UR REFRACT.AUTO: ABNORMAL
CO2 SERPL-SCNC: 28 MMOL/L (ref 23–29)
COLOR UR AUTO: YELLOW
CREAT SERPL-MCNC: 1 MG/DL (ref 0.5–1.4)
CTP QC/QA: YES
DIFFERENTIAL METHOD: ABNORMAL
EOSINOPHIL # BLD AUTO: 0.1 K/UL (ref 0–0.5)
EOSINOPHIL NFR BLD: 2.3 % (ref 0–8)
ERYTHROCYTE [DISTWIDTH] IN BLOOD BY AUTOMATED COUNT: 13.2 % (ref 11.5–14.5)
EST. GFR  (AFRICAN AMERICAN): >60 ML/MIN/1.73 M^2
EST. GFR  (NON AFRICAN AMERICAN): 58.8 ML/MIN/1.73 M^2
FECAL OCCULT BLOOD, POC: NEGATIVE
GLUCOSE SERPL-MCNC: 74 MG/DL (ref 70–110)
GLUCOSE UR QL STRIP: NEGATIVE
HCT VFR BLD AUTO: 44.2 % (ref 37–48.5)
HDLC SERPL-MCNC: 37 MG/DL (ref 40–75)
HDLC SERPL: 19.8 % (ref 20–50)
HGB BLD-MCNC: 13.5 G/DL (ref 12–16)
HGB UR QL STRIP: NEGATIVE
IMM GRANULOCYTES # BLD AUTO: 0.01 K/UL (ref 0–0.04)
IMM GRANULOCYTES NFR BLD AUTO: 0.2 % (ref 0–0.5)
KETONES UR QL STRIP: NEGATIVE
LDLC SERPL CALC-MCNC: 115 MG/DL (ref 63–159)
LEUKOCYTE ESTERASE UR QL STRIP: NEGATIVE
LYMPHOCYTES # BLD AUTO: 1.9 K/UL (ref 1–4.8)
LYMPHOCYTES NFR BLD: 32 % (ref 18–48)
MCH RBC QN AUTO: 32.5 PG (ref 27–31)
MCHC RBC AUTO-ENTMCNC: 30.5 G/DL (ref 32–36)
MCV RBC AUTO: 106 FL (ref 82–98)
MONOCYTES # BLD AUTO: 0.6 K/UL (ref 0.3–1)
MONOCYTES NFR BLD: 10.3 % (ref 4–15)
NEUTROPHILS # BLD AUTO: 3.3 K/UL (ref 1.8–7.7)
NEUTROPHILS NFR BLD: 54.4 % (ref 38–73)
NITRITE UR QL STRIP: NEGATIVE
NONHDLC SERPL-MCNC: 150 MG/DL
NRBC BLD-RTO: 0 /100 WBC
PH UR STRIP: 5 [PH] (ref 5–8)
PLATELET # BLD AUTO: 201 K/UL (ref 150–350)
PMV BLD AUTO: 10.9 FL (ref 9.2–12.9)
POTASSIUM SERPL-SCNC: 3.8 MMOL/L (ref 3.5–5.1)
PROT SERPL-MCNC: 7.4 G/DL (ref 6–8.4)
PROT UR QL STRIP: NEGATIVE
RBC # BLD AUTO: 4.16 M/UL (ref 4–5.4)
SODIUM SERPL-SCNC: 142 MMOL/L (ref 136–145)
SP GR UR STRIP: 1.02 (ref 1–1.03)
TRIGL SERPL-MCNC: 175 MG/DL (ref 30–150)
TSH SERPL DL<=0.005 MIU/L-ACNC: 1.8 UIU/ML (ref 0.4–4)
URATE SERPL-MCNC: 5.2 MG/DL (ref 2.4–5.7)
URN SPEC COLLECT METH UR: ABNORMAL
WBC # BLD AUTO: 6 K/UL (ref 3.9–12.7)

## 2020-03-09 PROCEDURE — 81003 URINALYSIS AUTO W/O SCOPE: CPT

## 2020-03-09 PROCEDURE — 80061 LIPID PANEL: CPT

## 2020-03-09 PROCEDURE — 99999 PR PBB SHADOW E&M-EST. PATIENT-LVL V: ICD-10-PCS | Mod: PBBFAC,,, | Performed by: FAMILY MEDICINE

## 2020-03-09 PROCEDURE — 82270 OCCULT BLOOD FECES: CPT | Mod: PBBFAC,PO | Performed by: FAMILY MEDICINE

## 2020-03-09 PROCEDURE — 99999 PR PBB SHADOW E&M-EST. PATIENT-LVL V: CPT | Mod: PBBFAC,,, | Performed by: FAMILY MEDICINE

## 2020-03-09 PROCEDURE — 84550 ASSAY OF BLOOD/URIC ACID: CPT

## 2020-03-09 PROCEDURE — 80053 COMPREHEN METABOLIC PANEL: CPT

## 2020-03-09 PROCEDURE — 99214 PR OFFICE/OUTPT VISIT, EST, LEVL IV, 30-39 MIN: ICD-10-PCS | Mod: 25,S$PBB,, | Performed by: FAMILY MEDICINE

## 2020-03-09 PROCEDURE — 36415 COLL VENOUS BLD VENIPUNCTURE: CPT | Mod: PO

## 2020-03-09 PROCEDURE — G0101 CA SCREEN;PELVIC/BREAST EXAM: HCPCS | Mod: 51,PBBFAC,PO | Performed by: FAMILY MEDICINE

## 2020-03-09 PROCEDURE — 99214 OFFICE O/P EST MOD 30 MIN: CPT | Mod: 25,S$PBB,, | Performed by: FAMILY MEDICINE

## 2020-03-09 PROCEDURE — 99215 OFFICE O/P EST HI 40 MIN: CPT | Mod: PBBFAC,PO | Performed by: FAMILY MEDICINE

## 2020-03-09 PROCEDURE — 85025 COMPLETE CBC W/AUTO DIFF WBC: CPT

## 2020-03-09 PROCEDURE — G0009 ADMIN PNEUMOCOCCAL VACCINE: HCPCS | Mod: PBBFAC,PO

## 2020-03-09 PROCEDURE — G0101 CA SCREEN;PELVIC/BREAST EXAM: HCPCS | Mod: S$PBB,,, | Performed by: FAMILY MEDICINE

## 2020-03-09 PROCEDURE — G0101 PR CA SCREEN;PELVIC/BREAST EXAM: ICD-10-PCS | Mod: S$PBB,,, | Performed by: FAMILY MEDICINE

## 2020-03-09 PROCEDURE — 84443 ASSAY THYROID STIM HORMONE: CPT

## 2020-03-09 NOTE — PROGRESS NOTES
HISTORY OF PRESENT ILLNESS: Ms. Hope comes in today fasting and with taking medication without acute problems for annual wellness examination and for follow up of chronic medical problems.  She reports no acute problems today.    END OF LIFE DECISION: She has no living will and does not desire life support.    Current Outpatient Medications   Medication Sig    allopurinol (ZYLOPRIM) 100 MG tablet TAKE 1 TABLET BY MOUTH ONCE DAILY    atenoloL (TENORMIN) 100 MG tablet TAKE 1 TABLET(100 MG) BY MOUTH EVERY DAY    atenolol (TENORMIN) 50 MG tablet TAKE 1 TABLET(50 MG) BY MOUTH EVERY DAY    baclofen (LIORESAL) 10 MG tablet TAKE 1 TABLET(10 MG) BY MOUTH EVERY NIGHT AS NEEDED    calcium carbonate-vitamin D2 600 mg calcium- 200 unit Cap Take 1 tablet by mouth Twice daily.    COMBIGAN 0.2-0.5 % Drop INSTILL 1 DROP IN BOTH EYES TWICE DAILY    dextran 70-hypromellose (ARTIFICIAL TEARS) ophthalmic solution Place 1 drop into both eyes. BOTH EYES PRN     hydroCHLOROthiazide (MICROZIDE) 12.5 mg capsule TAKE 1 CAPSULE BY MOUTH ONCE DAILY    irbesartan (AVAPRO) 300 MG tablet Take 1 tablet (300 mg total) by mouth once daily.    latanoprost 0.005 % ophthalmic solution INSTILL 1 DROP IN BOTH EYES EVERY DAY AT BEDTIME    levothyroxine (SYNTHROID) 88 MCG tablet TAKE 1 TABLET BY MOUTH EVERY MORNING BEFORE BREAKFAST    meloxicam (MOBIC) 15 MG tablet TAKE 1 TABLET(15 MG) BY MOUTH DAILY AS NEEDED    montelukast (SINGULAIR) 10 mg tablet Take 1 tablet (10 mg total) by mouth once daily.     SCREENINGS:   Cholesterol: March 7, 2019.  FFS/Colonoscopy: May 29, 2019 - benign colon polyp, diverticulosis; repeat in 5 years.  Mammogram: October 18, 2019 - benign; repeat in 6 months.  GYN Exam: March 7, 2019 - okay. Pap smears 2014 - 2018 - okay. Pap smear no longer needed.  Dexa Scan: March 9, 2015 - osteopenia; repeat in 5 years.  Eye Exam: February 10, 2020 with Dr. CATRACHO Scott and every 4 months for glaucoma surveillance and scheduled  for June 11, 2020.  PPD: Never.  Immunizations: Td/Tdap - March 6, 2017.  Gardasil - N./A.  Zostavax - February 17, 2014.  Shingles - Never. Advised patient insurance-covered benefit at local pharmacy.  Pneumovax - Never. She desires. Due on/after March 7, 2020.  Prevnar-13 shot - March 7, 2019.   Seasonal Flu - September 9, 2019.    ROS:  GENERAL: Denies fever, chills, fatigue or unusual weight change. Appetite normal. Weight 95.3 kg (210 lb 1.6 oz) at February 13, 2020 visit. Monitors diet some times. Exercises at [employer] 3 to 4 times per week - 45 to 60 minutes each time.  SKIN: Denies rashes, itching, changes in mole, color or texture of skin or easy bruising.  HEAD: Denies headaches or recent head trauma.  EYES: Denies change in vision, pain, diplopia, redness or discharge. She wears glasses.  EARS: Denies ear pain, discharge, vertigo or decreased hearing.  NOSE: Denies loss of smell, epistaxis or rhinitis.  MOUTH & THROAT: Denies hoarseness or change in voice. Denies excessive gum bleeding or mouth sores. Denies sore throat.  NODES: Denies swollen glands.  CHEST: Denies LUCERO, wheezing, cough, or sputum production.   CARDIOVASCULAR: Denies chest pain, PND, orthopnea or reduced exercise tolerance. Denies palpitations.   ABDOMEN: Denies diarrhea, constipation, nausea, vomiting, abdominal pain, or blood in stool.  URINARY: Denies flank pain, dysuria or hematuria.  GENITOURINARY: Denies flank pain, dysuria, frequency or hematuria. She does not perform monthly breast self exams.  ENDOCRINE: Denies diabetes or cholesterol problems.  HEME/LYMPH: Denies bleeding problems.  PERIPHERAL VASCULAR:Denies claudication or cyanosis.  MUSCULOSKELETAL: Denies joint stiffness, pain or swelling. Denies edema.  NEUROLOGIC: Denies history of seizures, tremors, paralysis, alteration of gait or coordination.  PSYCHIATRIC: Denies mood swings, depression, anxiety, homicidal or suicidal thoughts. Denies sleep problems.    PE:   VS:  "/83   Pulse 68   Temp 97.9 °F (36.6 °C) (Oral)   Resp 18   Ht 5' 6" (1.676 m)   Wt 95.6 kg (210 lb 12.2 oz)   SpO2 95%   BMI 34.02 kg/m²   APPEARANCE: Well nourished, well developed female, pleasant and obese, alert and oriented in no acute distress.   HEAD: Non tender. Full range of motion.  EYES: PERRL, conjunctiva pink, lids no edema.  EARS: External canal patent, no swelling or redness. TM's shiny and clear.  NOSE: Mucosa and turbinates pink, not swollen. No discharge. Non tender sinuses.  THROAT: No pharyngeal erythema or exudate. No stridor.   NECK: Supple, no mass, thyroid not enlarged.  NODES: No cervical, axillary or inguinal lymph node enlargement.  CHEST: Normal respiratory effort. Lungs clear to auscultation.  CARDIOVASCULAR: Normal S1, S2. No rubs, murmurs or gallops. PMI not displaced. No carotid bruit. Pedal pulses palpable bilaterally. No edema.  ABDOMEN: Bowel sounds present. Not distended. Soft. No tenderness, masses or organomegaly.  BREAST EXAM: Symmetrical, no external lesions, no discharge, no masses palpated.  PELVIC EXAM: No external lesions noted, no discharge, cervix appears normal, bimanual exam showed no uterine abnormalities and no adnexal masses or tenderness. Urethra and bladder intact.  RECTAL EXAM: No external hemorrhoids or anal fissures. Heme-negative stool in the rectal vault.  MUSCULOSKELETAL: No joint deformities or stiffness. She is ambulatory without problems.  SKIN: No rashes or suspicious lesions, normal color and turgor.  NEUROLOGIC: Cranial Nerves: II-XII grossly intact. DTR's: Knees, Ankles 2+ and equal bilaterally. Gait & Posture: Normal gait and fine motion.  PSYCHIATRIC: Patient alert, oriented x 3. Mood/Affect normal without acute anxiety or depression noted. Judgment/insight good as she makes appropriate decisions during today's examination.      ASSESSMENT:    ICD-10-CM ICD-9-CM    1. Essential hypertension I10 401.9 CBC auto differential      TSH      " Urinalysis      Comprehensive metabolic panel      Lipid panel   2. Hypothyroidism due to acquired atrophy of thyroid E03.4 244.8 TSH     246.8    3. Hyperuricemia E79.0 790.6 Uric acid   4. Osteopenia, unspecified location M85.80 733.90 DXA Bone Density Spine And Hip   5. Low-tension glaucoma of both eyes, mild stage H40.1231 365.12      365.71    6. History of colon polyps Z86.010 V12.72    7. Obesity (BMI 30.0-34.9) E66.9 278.00    8. Postmenopausal Z78.0 V49.81 DXA Bone Density Spine And Hip   9. Visit for screening mammogram Z12.31 V76.12 Mammo Digital Screening Bilat   10. Colon cancer screening Z12.11 V76.51 POCT Occult Blood Stool   11. Need for prophylactic vaccination against Streptococcus pneumoniae (pneumococcus) Z23 V03.82 Pneumococcal Polysaccharide Vaccine (23 Valent) (SQ/IM)     PLAN:  1. Age-appropriate counseling-appropriate low-sodium, low-cholesterol, low carbohydrate diet and exercise daily, monthly breast self exam, annual wellness examination.   2. Patient advised to call for results.  3. Continue current medications.  4. Keep follow up with specialists.  5. Follow up in about 6 months (around 9/9/2020) for hypertension follow up.     Answers for HPI/ROS submitted by the patient on 3/6/2020   activity change: No  unexpected weight change: No  neck pain: No  hearing loss: No  rhinorrhea: No  trouble swallowing: No  eye discharge: No  visual disturbance: No  chest tightness: No  wheezing: No  chest pain: No  palpitations: No  blood in stool: No  constipation: No  vomiting: No  diarrhea: No  polydipsia: No  polyuria: No  difficulty urinating: No  hematuria: No  menstrual problem: No  dysuria: No  joint swelling: No  arthralgias: No  headaches: No  weakness: No  confusion: No  dysphoric mood: No

## 2020-03-17 ENCOUNTER — TELEPHONE (OUTPATIENT)
Dept: OPHTHALMOLOGY | Facility: CLINIC | Age: 65
End: 2020-03-17

## 2020-03-17 DIAGNOSIS — I10 ESSENTIAL HYPERTENSION: Chronic | ICD-10-CM

## 2020-03-17 DIAGNOSIS — R00.2 PALPITATIONS: ICD-10-CM

## 2020-03-17 NOTE — TELEPHONE ENCOUNTER
----- Message from Reema Sandoval sent at 3/17/2020  9:25 AM CDT -----  Contact: pt  [phone] c/o red eye pt not having any pain or discomfort eye is just red want to know if she need to be seen

## 2020-03-17 NOTE — TELEPHONE ENCOUNTER
pt. called and stated her eye was blood red but no pain, itchiness, or discharge. advised the patient it could be a sub. conj. hem but we coouldnt say for sure without her coming in and her eyewill reabsorb the blood and she can use artifcial tears to soothe the eye but if it gets worse, starts hurting, or having discharge please call and let us know

## 2020-03-18 RX ORDER — ATENOLOL 50 MG/1
TABLET ORAL
Qty: 90 TABLET | Refills: 1 | Status: SHIPPED | OUTPATIENT
Start: 2020-03-18 | End: 2020-09-09

## 2020-03-20 ENCOUNTER — TELEPHONE (OUTPATIENT)
Dept: RADIOLOGY | Facility: HOSPITAL | Age: 65
End: 2020-03-20

## 2020-03-23 ENCOUNTER — TELEPHONE (OUTPATIENT)
Dept: OPHTHALMOLOGY | Facility: CLINIC | Age: 65
End: 2020-03-23

## 2020-03-23 NOTE — TELEPHONE ENCOUNTER
I spoke to the patient. The patient states that her left eye is red. The redness started last Tuesday. She is not having any eye pain, no vision changes, and no yellowish/greenish discharge. The patient states that her left eye is tearing. Per Dr. Scott, add artificial tears prn and Zaditor (one drop 2 times a day as needed). The patient has been notified. The patient will call us back if her symptoms get worse or don't improve.

## 2020-03-23 NOTE — TELEPHONE ENCOUNTER
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

## 2020-03-23 NOTE — TELEPHONE ENCOUNTER
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

## 2020-04-14 RX ORDER — IRBESARTAN 300 MG/1
TABLET ORAL
Qty: 30 TABLET | Refills: 5 | Status: SHIPPED | OUTPATIENT
Start: 2020-04-14 | End: 2020-10-06 | Stop reason: SDUPTHER

## 2020-04-24 ENCOUNTER — PATIENT MESSAGE (OUTPATIENT)
Dept: ADMINISTRATIVE | Facility: OTHER | Age: 65
End: 2020-04-24

## 2020-05-04 RX ORDER — HYDROCHLOROTHIAZIDE 12.5 MG/1
CAPSULE ORAL
Qty: 30 CAPSULE | Refills: 5 | Status: SHIPPED | OUTPATIENT
Start: 2020-05-04 | End: 2020-11-05 | Stop reason: SDUPTHER

## 2020-05-04 RX ORDER — ALLOPURINOL 100 MG/1
TABLET ORAL
Qty: 30 TABLET | Refills: 5 | Status: SHIPPED | OUTPATIENT
Start: 2020-05-04 | End: 2020-11-09 | Stop reason: SDUPTHER

## 2020-05-12 ENCOUNTER — PATIENT OUTREACH (OUTPATIENT)
Dept: OTHER | Facility: OTHER | Age: 65
End: 2020-05-12

## 2020-05-12 NOTE — PROGRESS NOTES
Digital Medicine: Health  Introduction    Introduced Sophia Hope to Digital Medicine. Discussed health  role and recommended lifestyle modifications.        HYPERTENSION  Our goal is to get BP to consistently below 130/80mmHg and make the process convenient so patient can avoid extra trips to the office. Getting your blood pressure below 130/80mmHg (definition of control) will reduce your risk for heart attack, kidney failure, stroke and death (as well as kidney failure, eye disease, & dementia)      Reviewed that the Digital Medicine care team - consisting of a clinician and a health  - will follow the most current evidence-based national guidelines for treating your condition.  The health  will focus on lifestyle modifications and motivation while the clinician will focus on medication therapy.  The care team will review all data on a regular basis and reach out as needed.      Explained that one of the key parts of the program is communication with the care team.  Asked patient to respond to outreach attempts and complete questionnaires.  Stressed importance of medication adherence.    Reviewed non-pharmacologic therapies and impact on BP.      Explained that we expect patient to obtain several blood pressures per week at random times of day.  Instructed patient not to allow anyone else to use phone and monitoring device.  Confirmed appropriate BP monitoring technique.      Explained to patient that the digital medicine team is not available for emergencies.  Patient will call [facility] on-call ([phone] or [phone]) or [phone] if needed.      Patient's BP goal is 130/80.Patient's BP average is 133/83 mmHg, which is above goal, per 2017 ACC/AHA Hypertension Guidelines.  When asked what the patient thinks is causing BP to be elevated, they state: patient believes her BP will be at goal with improved technique      Last 5 Patient Entered Readings                                       Current 30 Day Average: 133/83     Recent Readings 5/11/2020 5/10/2020 5/10/2020    SBP (mmHg) 131 135 136    DBP (mmHg) 82 82 86    Pulse 69 63 64            Intervention/Plan    There are no preventive care reminders to display for this patient.    Reviewed the importance of self-monitoring, medication adherence, and that the health  can be used as a resource for lifestyle modifications to help reduce or maintain a healthy lifestyle.    Sent link to [facility] webpages and my contact information via [facility] for future questions. Follow up scheduled.         Screenings    SDOH

## 2020-05-12 NOTE — LETTER
May 12, 2020     Sophia Hope  [facility] [facility] [address]       Dear Sophia,    [facility]! Our goal is to make care effective, proactive and convenient by using data you send us from home to better treat your chronic conditions.              My name is Audra pU, and I am your dedicated Digital Medicine clinician. As an expert in medication management, I will help ensure that the medications you are taking continue to provide the intended benefits and help you reach your goals. You can reach me directly at [phone] or by sending me a message directly through your [facility] account.      I am Tahira Loyola and I will be your health . My job is to help you identify lifestyle changes to improve your disease control. We will talk about nutrition, exercise, and other ways you may be able to adjust your current habits to better your health. Additionally, we will help ensure you are completing the tests and screenings that are necessary to help manage your conditions. You can reach me directly at [phone] or by sending me a message directly through your [facility] account.    Most importantly, YOU are at the center of this team. Together, we will work to improve your overall health and encourage you to meet your goals for a healthier lifestyle.     What we expect from YOU:  · Please take frequent home blood pressure measurements. We ask that you take at least 1 blood pressure reading per week, but more information will better help us get you know you. Be sure you rest for a few minutes before taking the reading in a quiet, comfortable place.     Be available to receive phone calls or [employer] messages, when appropriate, from your care team. Please let us know if there are any specific days or times that work best for us to reach you via phone.     Complete routine tests and screenings. Dont worry, we will help keep you on track!           What you  should expect from your Digital Medicine Care Team:   We will work with you to create a personalized plan of care and provide you with encouragement and education, including regarding lifestyle changes, that could help you manage your disease states.     We will adjust your current medications, if needed, and continue to monitor your long-term progress.     We will provide you and your physician with monthly progress reports after you have been in the program for more than 30 days.     We will send you reminders through [employer] and text messages to help ensure you do not miss any testing deadlines to help manage your disease states.    You will be able to reach us by phone or through your [facility] account by clicking our names under Care Team on the right side of the home screen.    I look forward to working with you to achieve your blood pressure goals!    We look forward to working with you to help manage your health,    Sincerely,    Your Digital Medicine Team    Please visit our websites to learn more:   · Hypertension: [email or web address]      Remember, we are not available for emergencies. If you have an emergency, please contact your doctors office directly or call [facility] on[facility]call ([phone] or [phone]) or [phone].

## 2020-05-15 DIAGNOSIS — J30.2 SEASONAL ALLERGIC RHINITIS, UNSPECIFIED TRIGGER: ICD-10-CM

## 2020-05-15 RX ORDER — MONTELUKAST SODIUM 10 MG/1
TABLET ORAL
Qty: 30 TABLET | Refills: 5 | Status: SHIPPED | OUTPATIENT
Start: 2020-05-15 | End: 2020-11-10 | Stop reason: SDUPTHER

## 2020-05-30 DIAGNOSIS — E03.4 HYPOTHYROIDISM DUE TO ACQUIRED ATROPHY OF THYROID: Chronic | ICD-10-CM

## 2020-05-31 PROCEDURE — 99454 REM MNTR PHYSIOL PARAM 16-30: CPT | Mod: PBBFAC,PO | Performed by: FAMILY MEDICINE

## 2020-05-31 RX ORDER — LEVOTHYROXINE SODIUM 88 UG/1
TABLET ORAL
Qty: 90 TABLET | Refills: 0 | Status: SHIPPED | OUTPATIENT
Start: 2020-05-31 | End: 2020-08-29

## 2020-06-11 ENCOUNTER — OFFICE VISIT (OUTPATIENT)
Dept: OPHTHALMOLOGY | Facility: CLINIC | Age: 65
End: 2020-06-11
Payer: MEDICARE

## 2020-06-11 ENCOUNTER — HOSPITAL ENCOUNTER (OUTPATIENT)
Dept: RADIOLOGY | Facility: HOSPITAL | Age: 65
Discharge: HOME | End: 2020-06-11
Attending: FAMILY MEDICINE
Payer: MEDICARE

## 2020-06-11 VITALS — HEIGHT: 66 IN | BODY MASS INDEX: 33.87 KG/M2 | WEIGHT: 210.75 LBS

## 2020-06-11 DIAGNOSIS — Z12.31 VISIT FOR SCREENING MAMMOGRAM: ICD-10-CM

## 2020-06-11 DIAGNOSIS — Z96.1 PSEUDOPHAKIA OF BOTH EYES: ICD-10-CM

## 2020-06-11 DIAGNOSIS — H40.1231 LOW-TENSION GLAUCOMA OF BOTH EYES, MILD STAGE: Primary | ICD-10-CM

## 2020-06-11 DIAGNOSIS — R92.8 CATEGORY 3 MAMMOGRAPHY RESULT WITH SHORT FOLLOW-UP INTERVAL SUGGESTED FOR PROBABLY BENIGN FINDING: ICD-10-CM

## 2020-06-11 PROCEDURE — 76642 ULTRASOUND BREAST LIMITED: CPT | Mod: TC,RT

## 2020-06-11 PROCEDURE — 77066 DX MAMMO INCL CAD BI: CPT | Mod: 26,,, | Performed by: RADIOLOGY

## 2020-06-11 PROCEDURE — 99999 PR PBB SHADOW E&M-EST. PATIENT-LVL II: ICD-10-PCS | Mod: PBBFAC,,, | Performed by: OPHTHALMOLOGY

## 2020-06-11 PROCEDURE — 92012 PR EYE EXAM, EST PATIENT,INTERMED: ICD-10-PCS | Mod: S$PBB,,, | Performed by: OPHTHALMOLOGY

## 2020-06-11 PROCEDURE — 76642 ULTRASOUND BREAST LIMITED: CPT | Mod: 26,RT,, | Performed by: RADIOLOGY

## 2020-06-11 PROCEDURE — 92012 INTRM OPH EXAM EST PATIENT: CPT | Mod: S$PBB,,, | Performed by: OPHTHALMOLOGY

## 2020-06-11 PROCEDURE — 77066 MAMMO DIGITAL DIAGNOSTIC BILAT WITH TOMOSYNTHESIS_CAD: ICD-10-PCS | Mod: 26,,, | Performed by: RADIOLOGY

## 2020-06-11 PROCEDURE — 77062 BREAST TOMOSYNTHESIS BI: CPT | Mod: 26,,, | Performed by: RADIOLOGY

## 2020-06-11 PROCEDURE — 99212 OFFICE O/P EST SF 10 MIN: CPT | Mod: PBBFAC,25 | Performed by: OPHTHALMOLOGY

## 2020-06-11 PROCEDURE — 77066 DX MAMMO INCL CAD BI: CPT | Mod: TC

## 2020-06-11 PROCEDURE — 99999 PR PBB SHADOW E&M-EST. PATIENT-LVL II: CPT | Mod: PBBFAC,,, | Performed by: OPHTHALMOLOGY

## 2020-06-11 PROCEDURE — 76642 US BREAST RIGHT LIMITED: ICD-10-PCS | Mod: 26,RT,, | Performed by: RADIOLOGY

## 2020-06-11 PROCEDURE — 77062 MAMMO DIGITAL DIAGNOSTIC BILAT WITH TOMOSYNTHESIS_CAD: ICD-10-PCS | Mod: 26,,, | Performed by: RADIOLOGY

## 2020-06-19 ENCOUNTER — PATIENT OUTREACH (OUTPATIENT)
Dept: OTHER | Facility: OTHER | Age: 65
End: 2020-06-19

## 2020-06-19 DIAGNOSIS — I10 ESSENTIAL HYPERTENSION: Primary | Chronic | ICD-10-CM

## 2020-06-26 DIAGNOSIS — R92.8 ABNORMAL MAMMOGRAM: Primary | ICD-10-CM

## 2020-06-29 ENCOUNTER — TELEPHONE (OUTPATIENT)
Dept: RADIOLOGY | Facility: HOSPITAL | Age: 65
End: 2020-06-29

## 2020-06-29 ENCOUNTER — TELEPHONE (OUTPATIENT)
Dept: FAMILY MEDICINE | Facility: CLINIC | Age: 65
End: 2020-06-29

## 2020-06-29 PROCEDURE — 99454 REM MNTR PHYSIOL PARAM 16-30: CPT | Mod: PBBFAC,PO | Performed by: FAMILY MEDICINE

## 2020-06-29 NOTE — TELEPHONE ENCOUNTER
----- Message from Liana Garcia sent at 6/29/2020  1:06 PM CDT -----  Contact: Sophia Cortes would like a call back at [phone], Regards to why she needs to have another mammogram and ultrasound again.    Thanks  Td

## 2020-06-29 NOTE — TELEPHONE ENCOUNTER
Pt hd 6-month f/u mammogram and ultrasound on 6/11/2020. Radiologist recommended again 6-month follow up (which should be in December 2020).  I was told by nursing staff that radiology department would schedule if I put order in. So, I'm not sure how scheduled her so early.  Please discuss with radiology dept. Thanks.

## 2020-06-29 NOTE — TELEPHONE ENCOUNTER
----- Message from Petty Cabezas sent at 6/29/2020  8:12 AM CDT -----  Contact: pt  Pt requesting a call back regarding mammogram and us. She would like to know why she is coming in for it again when she just had it done on 06/11/2020. Please call pt back at [phone]

## 2020-06-29 NOTE — TELEPHONE ENCOUNTER
Spoke with [place] patient isnt due until December. Appointments rebooked. Patient voiced understanding

## 2020-07-23 ENCOUNTER — PATIENT OUTREACH (OUTPATIENT)
Dept: OTHER | Facility: OTHER | Age: 65
End: 2020-07-23

## 2020-07-23 NOTE — PROGRESS NOTES
Digital Medicine: Health  Follow-Up    Called to follow up with patient, current BP average 125/79 is at goal <130/<80. Patient is doing well and reports no complaints/concerns at this time.   Patient denied health coaching, goal setting, and resources.   Patient denied any questions/concerns for her digital medicine care team at this time.      The history is provided by the patient.             Reason for review: Blood pressure at goal            Diet-Not assessed      Additional diet details:    Physical Activity-Not assessed    Medication Adherence-Medication Adherence not addressed.      Substance, Sleep, Stress-Not assessed    Patient verbalizes understanding. Patient did not express questions or concerns and patient has contact information if needed.    Explained the importance of self-monitoring and medication adherence. Encouraged the patient to communicate with their health  for lifestyle modifications to help improve or maintain a healthy lifestyle.        There are no preventive care reminders to display for this patient.    Last 5 Patient Entered Readings                                      Current 30 Day Average: 125/79     Recent Readings 7/17/2020 7/10/2020 7/7/2020 7/1/2020 6/26/2020    SBP (mmHg) 124 119 133 130 119    DBP (mmHg) 79 81 81 70 84    Pulse 61 76 54 75 69

## 2020-08-06 NOTE — PROGRESS NOTES
Digital Medicine: Clinician Introduction    Sophia Hope is a 65 y.o. female who is newly enrolled in the Digital Medicine Clinic.    I spoke with the patient today for her initial enrollment call.  She is currently taking 150 mg of the atenolol, hydrochlorothiazide in the irbesartan in the morning.  Currently her blood pressure readings are at goal.    The history is provided by the patient.      Review of patient's allergies indicates:   -- Clonidine     --  Other reaction(s): Rash   -- Cardizem (diltiazem hcl) -- Rash    --  Years ago per patientVerified pharmacy information.    HYPERTENSION  Explained hypertension digital medicine goals including BP goal less than or equal to 130/80mmHg, improved convenience of BP management and reduced risk of heart attack, kidney failure, stroke, eye disease, dementia, and death.     Explained non-pharmacologic therapies like low salt diet and physical activity can reduce blood pressure.       Explained that we expect patient to submit several blood pressure readings per week at random times of the day, but at least 30 minutes after taking blood pressure medications. Instructed patient not to allow anyone else to use their blood pressure monitor and phone as data submitted is directly entered into medical record. Reviewed and confirmed appropriate blood pressure monitoring technique.         Reviewed signs/symptoms of hypertension (headache, changes in vision, chest pain, shortness of breath)   Reviewed signs/symptoms of hypotension (lightheaded, dizziness, weakness)     Patient's BP goal is less than or equal to 130/80. Patients BP average is 122/78 mmHg, which is at goal, per 2017 ACC/AHA Hypertension Guidelines.       Last 5 Patient Entered Readings                                      Current 30 Day Average: 122/78     Recent Readings 7/31/2020 7/24/2020 7/17/2020 7/10/2020 7/7/2020    SBP (mmHg) 107 127 124 119 133    DBP (mmHg) 73 78 79 81 81    Pulse 61 61 61 76 54               Depression Screening  Sophia Hope screened negative on the depression screening.     Sleep Apnea Screening  Patient not previously diagnosed with LIBBY       Medication Affordability Screening  Patient screened negative on the medication affordability questionnaires. Patient is currently not having problems affording medications    Medication Adherence-Medication adherence was assessed.  Patient continue taking medication as prescribed.            ASSESSMENT(S)  Patients BP average is 122/78 mmHg, which is at goal. Patient's BP goal is less than or equal to 130/80 per 2017 ACC/AHA Hypertension Guidelines.      Hypertension Plan  Additional monitoring needed. F/u in 5 months  Continue current therapy.       Addressed any questions or concerns and patient has my contact information if needed prior to next outreach. Patient verbalizes understanding.      Sent link to [facility] webpages and my contact information via [employer] for future questions.        Explained to the patient that the Digital Medicine team is not available for emergencies. Advised patient call [facility] On Call ([phone] or [phone]) or [phone] if needed.         There are no preventive care reminders to display for this patient.    Current Medication Regimen:  Hypertension Medications             atenoloL (TENORMIN) 100 MG tablet TAKE 1 TABLET(100 MG) BY MOUTH EVERY DAY    atenoloL (TENORMIN) 50 MG tablet TAKE 1 TABLET(50 MG) BY MOUTH EVERY DAY    hydroCHLOROthiazide (MICROZIDE) 12.5 mg capsule TAKE 1 CAPSULE BY MOUTH ONCE DAILY    irbesartan (AVAPRO) 300 MG tablet TAKE 1 TABLET(300 MG) BY MOUTH EVERY DAY

## 2020-08-29 DIAGNOSIS — E03.4 HYPOTHYROIDISM DUE TO ACQUIRED ATROPHY OF THYROID: Chronic | ICD-10-CM

## 2020-08-29 RX ORDER — LEVOTHYROXINE SODIUM 88 UG/1
TABLET ORAL
Qty: 90 TABLET | Refills: 1 | Status: SHIPPED | OUTPATIENT
Start: 2020-08-29 | End: 2021-02-24

## 2020-09-09 ENCOUNTER — OFFICE VISIT (OUTPATIENT)
Dept: FAMILY MEDICINE | Facility: CLINIC | Age: 65
End: 2020-09-09
Payer: MEDICARE

## 2020-09-09 VITALS
RESPIRATION RATE: 18 BRPM | BODY MASS INDEX: 33.11 KG/M2 | DIASTOLIC BLOOD PRESSURE: 70 MMHG | HEART RATE: 72 BPM | OXYGEN SATURATION: 97 % | HEIGHT: 66 IN | WEIGHT: 206 LBS | SYSTOLIC BLOOD PRESSURE: 116 MMHG | TEMPERATURE: 98 F

## 2020-09-09 DIAGNOSIS — I10 ESSENTIAL HYPERTENSION: Chronic | ICD-10-CM

## 2020-09-09 DIAGNOSIS — E79.0 HYPERURICEMIA: ICD-10-CM

## 2020-09-09 DIAGNOSIS — H40.1231 LOW-TENSION GLAUCOMA OF BOTH EYES, MILD STAGE: ICD-10-CM

## 2020-09-09 DIAGNOSIS — M85.80 OSTEOPENIA, UNSPECIFIED LOCATION: Chronic | ICD-10-CM

## 2020-09-09 DIAGNOSIS — E66.9 OBESITY (BMI 30.0-34.9): ICD-10-CM

## 2020-09-09 PROCEDURE — 99214 OFFICE O/P EST MOD 30 MIN: CPT | Mod: S$PBB,,, | Performed by: FAMILY MEDICINE

## 2020-09-09 PROCEDURE — 99214 PR OFFICE/OUTPT VISIT, EST, LEVL IV, 30-39 MIN: ICD-10-PCS | Mod: S$PBB,,, | Performed by: FAMILY MEDICINE

## 2020-09-09 PROCEDURE — 99999 PR PBB SHADOW E&M-EST. PATIENT-LVL V: ICD-10-PCS | Mod: PBBFAC,,, | Performed by: FAMILY MEDICINE

## 2020-09-09 PROCEDURE — 99215 OFFICE O/P EST HI 40 MIN: CPT | Mod: PBBFAC,PO | Performed by: FAMILY MEDICINE

## 2020-09-09 PROCEDURE — 99999 PR PBB SHADOW E&M-EST. PATIENT-LVL V: CPT | Mod: PBBFAC,,, | Performed by: FAMILY MEDICINE

## 2020-09-09 NOTE — PROGRESS NOTES
Sophia Hope    Chief Complaint   Patient presents with    Hypertension    Follow-up       History of Present Illness:   Ms. Hope comes in today for 6-month hypertension follow-up.  She is not fasting but has taken medication today.  She states she has been monitoring her diet and exercises less since Covid-19 pandemic/restrictions. She states she participates with [employer] and states blood pressure levels are good.    She reports no acute problems today.  She denies fever, chills, fatigue, appetite change; hot or cold intolerance; abdominal pain, nausea, vomiting, diarrhea, constipation; chest pain, palpitations, leg swelling; shortness of breath, cough, wheezing; unusual urinary symptoms; back pain; headaches; anxiety or depression, homicidal or suicidal thoughts.     She follows with Dr. Scott, ophthalmologist, for surveillance and treatment of glaucoma with last visit on June 11, 2020 and 4-month follow-up advised and scheduled for October 12, 2020.      Labs:                 WBC                      6.00                03/09/2020                 HGB                      13.5                03/09/2020                 HCT                      44.2                03/09/2020                 PLT                      201                 03/09/2020                 CHOL                     187                 03/09/2020                 TRIG                     175 (H)             03/09/2020                 HDL                      37 (L)              03/09/2020                 ALT                      17                  03/09/2020                 AST                      25                  03/09/2020                 NA                       142                 03/09/2020                 K                        3.8                 03/09/2020                 CL                       104                 03/09/2020                 CREATININE               1.0                 03/09/2020                 BUN                       19                  03/09/2020                 CO2                      28                  03/09/2020                 TSH                      1.797               03/09/2020                 HGBA1C                   5.2                 11/23/2015               LDLCALC                  115.0               03/09/2020             URICACID                 5.2                 03/09/2020                    Current Outpatient Medications   Medication Sig    allopurinoL (ZYLOPRIM) 100 MG tablet TAKE 1 TABLET BY MOUTH ONCE DAILY    atenoloL (TENORMIN) 100 MG tablet TAKE 1 TABLET(100 MG) BY MOUTH EVERY DAY    atenoloL (TENORMIN) 50 MG tablet TAKE 1 TABLET(50 MG) BY MOUTH EVERY DAY    baclofen (LIORESAL) 10 MG tablet TAKE 1 TABLET(10 MG) BY MOUTH EVERY NIGHT AS NEEDED    calcium carbonate-vitamin D2 600 mg calcium- 200 unit Cap Take 1 tablet by mouth Twice daily.    COMBIGAN 0.2-0.5 % Drop INSTILL 1 DROP IN BOTH EYES TWICE DAILY    dextran 70-hypromellose (ARTIFICIAL TEARS) ophthalmic solution Place 1 drop into both eyes. BOTH EYES PRN     hydroCHLOROthiazide (MICROZIDE) 12.5 mg capsule TAKE 1 CAPSULE BY MOUTH ONCE DAILY    irbesartan (AVAPRO) 300 MG tablet TAKE 1 TABLET(300 MG) BY MOUTH EVERY DAY    latanoprost 0.005 % ophthalmic solution INSTILL 1 DROP IN BOTH EYES EVERY DAY AT BEDTIME    levothyroxine (SYNTHROID) 88 MCG tablet TAKE 1 TABLET BY MOUTH EVERY MORNING BEFORE BREAKFAST    meloxicam (MOBIC) 15 MG tablet TAKE 1 TABLET(15 MG) BY MOUTH DAILY AS NEEDED    montelukast (SINGULAIR) 10 mg tablet TAKE 1 TABLET(10 MG) BY MOUTH EVERY DAY       Review of Systems   Constitutional: Positive for activity change. Negative for appetite change, chills, fatigue, fever and unexpected weight change.        Weight   95.6 kg (210 lb 12.2 oz) at March 9, 2020 visit.   Eyes:        See history of present illness examination.   Respiratory: Negative for cough, shortness of breath and wheezing.    Cardiovascular:  Negative for chest pain, palpitations and leg swelling.        See history of present illness.   Gastrointestinal: Negative for abdominal pain, constipation, diarrhea, nausea and vomiting.   Endocrine: Negative for cold intolerance and heat intolerance.        See history of present illness examination.   Genitourinary: Negative for difficulty urinating.   Musculoskeletal: Negative for back pain.   Neurological: Negative for headaches.   Psychiatric/Behavioral: Negative for dysphoric mood and suicidal ideas. The patient is not nervous/anxious.         Negative for homicidal ideas.       Objective:  Physical Exam  Vitals signs reviewed.   Constitutional:       General: She is not in acute distress.     Appearance: Normal appearance. She is well-developed. She is obese. She is not ill-appearing, toxic-appearing or diaphoretic.   Neck:      Musculoskeletal: Normal range of motion and neck supple. No muscular tenderness.      Thyroid: No thyromegaly.   Cardiovascular:      Rate and Rhythm: Normal rate and regular rhythm.      Pulses: Normal pulses.      Heart sounds: Normal heart sounds. No murmur.   Pulmonary:      Effort: Pulmonary effort is normal. No respiratory distress.      Breath sounds: Normal breath sounds. No wheezing.   Abdominal:      General: Bowel sounds are normal. There is no distension.      Palpations: Abdomen is soft. There is no mass.      Tenderness: There is no abdominal tenderness. There is no guarding or rebound.   Musculoskeletal: Normal range of motion.         General: No swelling or tenderness.      Comments: She is ambulatory without problems.     Lymphadenopathy:      Cervical: No cervical adenopathy.   Neurological:      General: No focal deficit present.      Mental Status: She is alert and oriented to person, place, and time.   Psychiatric:         Mood and Affect: Mood normal.         Behavior: Behavior normal.         Thought Content: Thought content normal.         Judgment: Judgment  normal.         ASSESSMENT:  1. Essential hypertension    2. Hyperuricemia    3. Osteopenia, unspecified location    4. Low-tension glaucoma of both eyes, mild stage    5. Obesity (BMI 30.0-34.9)        PLAN:  Sophia was seen today for hypertension and follow-up.    Diagnoses and all orders for this visit:    Essential hypertension    Hyperuricemia    Osteopenia, unspecified location    Low-tension glaucoma of both eyes, mild stage    Obesity (BMI 30.0-34.9)       No labs today.  Continue current medications, follow low sodium, low cholesterol, low carb diet, daily walks.  Keep follow up with specialists.  Flu shot this fall.  Follow up in about 6 months (around 3/9/2021) for physical.

## 2020-09-15 ENCOUNTER — PATIENT OUTREACH (OUTPATIENT)
Dept: OTHER | Facility: OTHER | Age: 65
End: 2020-09-15

## 2020-09-15 NOTE — PROGRESS NOTES
"Digital Medicine: Health  Follow-Up    Called to follow up with patient, current BP average 125/82 is almost at goal <130/<80. Patient is doing well and reports no complaints/concerns at this time. Patient stated she had some in-office readings on the 7th or 8th and asked if we "received them". Educated patient on how readings with our program work and that we don't receive in office readings in the same way. She verbalized understanding and said she would continue to take readings as needed for HDMP.   Patient denied health coaching, goal setting, and resources. Patient denied any questions/concerns for her digital medicine care team at this time.        The history is provided by the patient.             Reason for review: Blood pressure at goal            Diet-Not assessed          Physical Activity-Not assessed    Medication Adherence-Medication Adherence not addressed.      Substance, Sleep, Stress-Not assessed      Continue current diet/physical activity routine. Denied making any changes at this time       Addressed patient questions and patient has my contact information if needed prior to next outreach. Patient verbalizes understanding.      Explained the importance of self-monitoring and medication adherence. Encouraged the patient to communicate with their health  for lifestyle modifications to help improve or maintain a healthy lifestyle.            There are no preventive care reminders to display for this patient.    Last 5 Patient Entered Readings                                      Current 30 Day Average: 125/82     Recent Readings 9/11/2020 9/2/2020 8/27/2020 8/27/2020 8/21/2020    SBP (mmHg) 120 109 150 153 114    DBP (mmHg) 78 79 88 89 76    Pulse 58 73 59 63 75               "

## 2020-09-18 DIAGNOSIS — I10 ESSENTIAL HYPERTENSION: Chronic | ICD-10-CM

## 2020-09-18 DIAGNOSIS — R00.2 PALPITATIONS: ICD-10-CM

## 2020-09-18 RX ORDER — ATENOLOL 50 MG/1
50 TABLET ORAL DAILY
Qty: 90 TABLET | Refills: 1 | Status: SHIPPED | OUTPATIENT
Start: 2020-09-18 | End: 2021-03-13

## 2020-09-18 NOTE — TELEPHONE ENCOUNTER
9/9/20 -- LOV    ----- Message from Arleth Hooks sent at 9/18/2020  9:41 AM CDT -----  Contact: pt  1. What is the name of the medication you are requesting? Atenolol   2. What is the dose? 50mg  3. How do you take the medication? Orally, topically, etc? .  4. How often do you take this medication? .  5. Do you need a 30 day or 90 day supply? 90  6. How many refills are you requesting? .  7. What is your preferred pharmacy and location of the pharmacy? [facility]  8. Who can we contact with further questions? [phone]

## 2020-10-06 RX ORDER — IRBESARTAN 300 MG/1
300 TABLET ORAL DAILY
Qty: 30 TABLET | Refills: 5 | Status: SHIPPED | OUTPATIENT
Start: 2020-10-06 | End: 2021-04-05

## 2020-10-06 NOTE — TELEPHONE ENCOUNTER
----- Message from Vilma Walsh sent at 10/6/2020  2:19 PM CDT -----  Type:  RX Refill Request    Who Called:  Pt  Sophia   Refill or New Rx:   refill   RX Name and Strength:    Irvesartatin 300mg   How is the patient currently taking it? (ex. 1XDay):   Takes once daily   Is this a 30 day or 90 day RX:  90 day   Preferred Pharmacy with phone number:  [facility]   Local or Mail Order:  Local   Ordering Provider:  Dr espinal   Would the patient rather a call back or a response via [facility]?   Call back   Best Call Back Number:  [phone]  Additional Information:  please call when sent in//patricia/marilee

## 2020-10-11 ENCOUNTER — PATIENT OUTREACH (OUTPATIENT)
Dept: ADMINISTRATIVE | Facility: OTHER | Age: 65
End: 2020-10-11

## 2020-10-11 NOTE — PROGRESS NOTES
Health Maintenance Due   Topic Date Due    Shingles Vaccine (2 of 3) 04/14/2014    Influenza Vaccine (1) 08/01/2020     Updates were requested from care everywhere.  Chart was reviewed for overdue [facility]active [facility] Encounters (MORE) topics (CRS, Breast Cancer Screening, Eye exam)  Health Maintenance has been updated.  LINKS immunization registry triggered.  Immunizations were reconciled.

## 2020-10-12 ENCOUNTER — APPOINTMENT (OUTPATIENT)
Dept: OPHTHALMOLOGY | Facility: CLINIC | Age: 65
End: 2020-10-12
Payer: MEDICARE

## 2020-10-12 ENCOUNTER — OFFICE VISIT (OUTPATIENT)
Dept: OPHTHALMOLOGY | Facility: CLINIC | Age: 65
End: 2020-10-12
Payer: MEDICARE

## 2020-10-12 DIAGNOSIS — H02.834 DERMATOCHALASIS OF BOTH UPPER EYELIDS: ICD-10-CM

## 2020-10-12 DIAGNOSIS — H02.831 DERMATOCHALASIS OF BOTH UPPER EYELIDS: ICD-10-CM

## 2020-10-12 DIAGNOSIS — H57.813 BROW PTOSIS, BILATERAL: ICD-10-CM

## 2020-10-12 DIAGNOSIS — Z96.1 PSEUDOPHAKIA OF BOTH EYES: ICD-10-CM

## 2020-10-12 DIAGNOSIS — H40.1231 LOW-TENSION GLAUCOMA OF BOTH EYES, MILD STAGE: Primary | ICD-10-CM

## 2020-10-12 PROCEDURE — 92083 HUMPHREY VISUAL FIELD - OU - BOTH EYES: ICD-10-PCS | Mod: 26,S$PBB,, | Performed by: OPHTHALMOLOGY

## 2020-10-12 PROCEDURE — 99999 PR PBB SHADOW E&M-EST. PATIENT-LVL III: CPT | Mod: PBBFAC,,, | Performed by: OPHTHALMOLOGY

## 2020-10-12 PROCEDURE — 92014 PR EYE EXAM, EST PATIENT,COMPREHESV: ICD-10-PCS | Mod: S$PBB,,, | Performed by: OPHTHALMOLOGY

## 2020-10-12 PROCEDURE — 92083 EXTENDED VISUAL FIELD XM: CPT | Mod: PBBFAC | Performed by: OPHTHALMOLOGY

## 2020-10-12 PROCEDURE — 99999 PR PBB SHADOW E&M-EST. PATIENT-LVL III: ICD-10-PCS | Mod: PBBFAC,,, | Performed by: OPHTHALMOLOGY

## 2020-10-12 PROCEDURE — 92014 COMPRE OPH EXAM EST PT 1/>: CPT | Mod: S$PBB,,, | Performed by: OPHTHALMOLOGY

## 2020-10-12 PROCEDURE — 92133 CPTRZD OPH DX IMG PST SGM ON: CPT | Mod: PBBFAC | Performed by: OPHTHALMOLOGY

## 2020-10-12 PROCEDURE — 99213 OFFICE O/P EST LOW 20 MIN: CPT | Mod: PBBFAC | Performed by: OPHTHALMOLOGY

## 2020-10-12 PROCEDURE — 92133 POSTERIOR SEGMENT OCT OPTIC NERVE(OCULAR COHERENCE TOMOGRAPHY) - OU - BOTH EYES: ICD-10-PCS | Mod: 26,S$PBB,, | Performed by: OPHTHALMOLOGY

## 2020-10-12 NOTE — PROGRESS NOTES
SUBJECTIVE  Sophia JACK Hope is 65 y.o. female  Uncorrected distance visual acuity was 20/20 in the right eye and 20/25-2 in the left eye.   Chief Complaint   Patient presents with    Glaucoma          HPI     Pt here for 4m HVF GOCT chk. No pain or discomfort. VA stable. Pt states   that as the day goes on or when she has to look in the distance, her   eyelids start to feel heavy. 100% compliant with gtts.     1. Mild LTG No FHx (init 17/17=21/21 adjusted for thin K's)  Goal <14  CCT /  2. PCIOL I-Stent (inject) OS +20.5 SN60WF (distance)12/19/2018  PCIOL / I-Stent OD +20.5 SN60WF (distance) 7-25-18  3. Ptosis    AT's prn OU    Combigan QAM OD  Xalatan QHS OU    Last edited by Delano Preciado, Patient Care Assistant on 10/12/2020  8:18   AM. (History)         Assessment /Plan :  1. Low-tension glaucoma of both eyes, mild stage Doing well, IOP within acceptable range relative to target IOP and no evidence of progression. Continue current treatment. Reviewed importance of continued compliance with treatment and follow up.     2. Pseudophakia of both eyes -stable    3. Dermatochalasis of both upper eyelids -pt is symptomatic with dermato/ptosis/brow ptosis OU. Consider plastics evaluation. Pt desires to follow at this time.    4. Brow ptosis, bilateral      Return to clinic in 4 months  or as needed.  With IOP Check

## 2020-10-13 ENCOUNTER — IMMUNIZATION (OUTPATIENT)
Dept: FAMILY MEDICINE | Facility: CLINIC | Age: 65
End: 2020-10-13
Payer: MEDICARE

## 2020-10-13 PROCEDURE — 99999 PR PBB SHADOW E&M-EST. PATIENT-LVL II: CPT | Mod: PBBFAC,,,

## 2020-10-13 PROCEDURE — 99999 PR PBB SHADOW E&M-EST. PATIENT-LVL II: ICD-10-PCS | Mod: PBBFAC,,,

## 2020-10-13 PROCEDURE — G0008 ADMIN INFLUENZA VIRUS VAC: HCPCS | Mod: PBBFAC,PO

## 2020-10-13 PROCEDURE — 99212 OFFICE O/P EST SF 10 MIN: CPT | Mod: PBBFAC,PO,25

## 2020-10-13 PROCEDURE — 90694 VACC AIIV4 NO PRSRV 0.5ML IM: CPT | Mod: PBBFAC,PO

## 2020-11-05 RX ORDER — HYDROCHLOROTHIAZIDE 12.5 MG/1
12.5 CAPSULE ORAL DAILY
Qty: 30 CAPSULE | Refills: 5 | Status: SHIPPED | OUTPATIENT
Start: 2020-11-05 | End: 2021-04-30

## 2020-11-09 DIAGNOSIS — J30.2 SEASONAL ALLERGIC RHINITIS, UNSPECIFIED TRIGGER: ICD-10-CM

## 2020-11-09 RX ORDER — ALLOPURINOL 100 MG/1
100 TABLET ORAL DAILY
Qty: 30 TABLET | Refills: 5 | Status: SHIPPED | OUTPATIENT
Start: 2020-11-09 | End: 2021-05-06

## 2020-11-09 NOTE — TELEPHONE ENCOUNTER
----- Message from Tamela Bush sent at 11/9/2020  9:44 AM CST -----  Regarding: refill  Contact: patient  Type:  RX Refill Request    Who Called: Patient  Refill or New Rx:refill  RX Name and Strength:Allopurinol, 100mg  How is the patient currently taking it? (ex. 1XDay):Once daily  Is this a 30 day or 90 day RX:90  Preferred Pharmacy with phone number:[facility]  Local or Mail Order:local  Ordering Provider:Dr Lance  Would the patient rather a call back or a response via [facility]? Call  Best Call Back Number:[phone]  Additional Information:

## 2020-11-10 RX ORDER — MONTELUKAST SODIUM 10 MG/1
10 TABLET ORAL DAILY
Qty: 30 TABLET | Refills: 5 | Status: SHIPPED | OUTPATIENT
Start: 2020-11-10 | End: 2021-05-06

## 2020-11-10 NOTE — TELEPHONE ENCOUNTER
----- Message from Bryan Howe sent at 11/10/2020  8:11 AM CST -----  .Type:  RX Refill Request    Who Called: Sophia Hope  Refill or New Rx:refill  RX Name and Strength:[id number]  How is the patient currently taking it? (ex. 1XDay):Daily  Is this a 30 day or 90 day RX:90  Preferred Pharmacy with phone number:.  [id number] [place] [id number] [facility] & [address] [address]  Phone: [phone] Fax: [phone]      Local or Mail Order:local  Ordering Provider:Dr. Lance  Would the patient rather a call back or a response via [facility]? Call back  Best Call Back Number:[phone]  Additional Information:

## 2020-11-16 ENCOUNTER — PATIENT OUTREACH (OUTPATIENT)
Dept: OTHER | Facility: OTHER | Age: 65
End: 2020-11-16

## 2020-11-16 NOTE — PROGRESS NOTES
Digital Medicine: Health  Follow-Up    Called to follow up with patient, current BP average 130/85 is not at goal <130/<80. Patient is doing well and reports no complaints/concerns at this time.       The history is provided by the patient.             Reason for review: Blood pressure not at goal        Topics Covered on Call: physical activity            Diet-Not assessed          Physical Activity-Change      She exercises for 30 minutes per day 7 day(s) a week.     She added watching a tv channel for exercise indoors daily to Her physical activity routine.    She removed n/a from Her physical activity.    She identified the following barriers to physical activity: motivation        Intervention(s): created new goal         Additional physical activity details: Patient denied needing any exercise resources at this time, she is satisfied with her tv show.       Medication Adherence-Medication Adherence not addressed.      Substance, Sleep, Stress-Not assessed      Continue current diet/physical activity routine. Encouraged patient to continue her new exercise routine  Provided patient education.       Addressed patient questions and patient has my contact information if needed prior to next outreach. Patient verbalizes understanding.      Explained the importance of self-monitoring and medication adherence. Encouraged the patient to communicate with their health  for lifestyle modifications to help improve or maintain a healthy lifestyle.               There are no preventive care reminders to display for this patient.      Last 5 Patient Entered Readings                                      Current 30 Day Average: 130/85     Recent Readings 11/8/2020 10/31/2020 10/30/2020 10/30/2020 10/25/2020    SBP (mmHg) 132 126 139 143 124    DBP (mmHg) 88 86 87 84 79    Pulse 70 72 66 59 71

## 2020-12-10 RX ORDER — BRIMONIDINE TARTRATE, TIMOLOL MALEATE 2; 5 MG/ML; MG/ML
SOLUTION/ DROPS OPHTHALMIC
Qty: 10 ML | Refills: 12 | Status: SHIPPED | OUTPATIENT
Start: 2020-12-10 | End: 2022-11-02

## 2020-12-10 NOTE — TELEPHONE ENCOUNTER
----- Message from Elijah Rodriguez sent at 12/10/2020  9:05 AM CST -----  Contact: self  Type:  RX Refill Request    Who Called: Sophia Hope   Refill or New Rx:refill  RX Name and Strength:combigan  How is the patient currently taking it? (ex. 1XDay):1 drop/every day right eye  Is this a 30 day or 90 day RX:30  Preferred Pharmacy with phone number:  [id number] - [place] [address] [facility] & [address] [address]  Phone: [phone] Fax: [phone]  Local or Mail Order: local  Ordering Provider:Sonia  Would the patient rather a call back or a response via [facility]? Call back  Best Call Back Number:[phone]  Additional Information: pt states the request was denied and would like to know why.  Thanks/As

## 2020-12-14 ENCOUNTER — HOSPITAL ENCOUNTER (OUTPATIENT)
Dept: RADIOLOGY | Facility: HOSPITAL | Age: 65
Discharge: HOME | End: 2020-12-14
Attending: FAMILY MEDICINE
Payer: MEDICARE

## 2020-12-14 VITALS — BODY MASS INDEX: 32.95 KG/M2 | HEIGHT: 66 IN | WEIGHT: 205 LBS

## 2020-12-14 DIAGNOSIS — R92.8 ABNORMAL MAMMOGRAM: ICD-10-CM

## 2020-12-14 PROCEDURE — 76642 ULTRASOUND BREAST LIMITED: CPT | Mod: 26,RT,, | Performed by: RADIOLOGY

## 2020-12-14 PROCEDURE — 77065 MAMMO DIGITAL DIAGNOSTIC RIGHT WITH TOMO: ICD-10-PCS | Mod: 26,RT,, | Performed by: RADIOLOGY

## 2020-12-14 PROCEDURE — 77065 DX MAMMO INCL CAD UNI: CPT | Mod: TC,RT

## 2020-12-14 PROCEDURE — 77061 MAMMO DIGITAL DIAGNOSTIC RIGHT WITH TOMO: ICD-10-PCS | Mod: 26,RT,, | Performed by: RADIOLOGY

## 2020-12-14 PROCEDURE — 76642 ULTRASOUND BREAST LIMITED: CPT | Mod: TC,RT

## 2020-12-14 PROCEDURE — 77061 BREAST TOMOSYNTHESIS UNI: CPT | Mod: TC,RT

## 2020-12-14 PROCEDURE — 77061 BREAST TOMOSYNTHESIS UNI: CPT | Mod: 26,RT,, | Performed by: RADIOLOGY

## 2020-12-14 PROCEDURE — 77065 DX MAMMO INCL CAD UNI: CPT | Mod: 26,RT,, | Performed by: RADIOLOGY

## 2020-12-14 PROCEDURE — 76642 US BREAST RIGHT LIMITED: ICD-10-PCS | Mod: 26,RT,, | Performed by: RADIOLOGY

## 2020-12-30 ENCOUNTER — PATIENT OUTREACH (OUTPATIENT)
Dept: OTHER | Facility: OTHER | Age: 65
End: 2020-12-30

## 2021-01-13 ENCOUNTER — PATIENT MESSAGE (OUTPATIENT)
Dept: ADMINISTRATIVE | Facility: OTHER | Age: 66
End: 2021-01-13

## 2021-01-28 ENCOUNTER — PATIENT MESSAGE (OUTPATIENT)
Dept: ADMINISTRATIVE | Facility: OTHER | Age: 66
End: 2021-01-28

## 2021-02-18 ENCOUNTER — OFFICE VISIT (OUTPATIENT)
Dept: OPHTHALMOLOGY | Facility: CLINIC | Age: 66
End: 2021-02-18
Payer: MEDICARE

## 2021-02-18 DIAGNOSIS — Z96.1 PSEUDOPHAKIA OF BOTH EYES: ICD-10-CM

## 2021-02-18 DIAGNOSIS — H40.1231 LOW-TENSION GLAUCOMA OF BOTH EYES, MILD STAGE: Primary | ICD-10-CM

## 2021-02-18 PROCEDURE — 99999 PR PBB SHADOW E&M-EST. PATIENT-LVL III: CPT | Mod: PBBFAC,,, | Performed by: OPHTHALMOLOGY

## 2021-02-18 PROCEDURE — 99213 PR OFFICE/OUTPT VISIT, EST, LEVL III, 20-29 MIN: ICD-10-PCS | Mod: S$PBB,,, | Performed by: OPHTHALMOLOGY

## 2021-02-18 PROCEDURE — 99213 OFFICE O/P EST LOW 20 MIN: CPT | Mod: PBBFAC | Performed by: OPHTHALMOLOGY

## 2021-02-18 PROCEDURE — 99999 PR PBB SHADOW E&M-EST. PATIENT-LVL III: ICD-10-PCS | Mod: PBBFAC,,, | Performed by: OPHTHALMOLOGY

## 2021-02-18 PROCEDURE — 99213 OFFICE O/P EST LOW 20 MIN: CPT | Mod: S$PBB,,, | Performed by: OPHTHALMOLOGY

## 2021-03-09 ENCOUNTER — LAB VISIT (OUTPATIENT)
Dept: LAB | Facility: HOSPITAL | Age: 66
End: 2021-03-09
Attending: FAMILY MEDICINE
Payer: MEDICARE

## 2021-03-09 ENCOUNTER — OFFICE VISIT (OUTPATIENT)
Dept: FAMILY MEDICINE | Facility: CLINIC | Age: 66
End: 2021-03-09
Payer: MEDICARE

## 2021-03-09 VITALS
TEMPERATURE: 97 F | HEART RATE: 81 BPM | SYSTOLIC BLOOD PRESSURE: 128 MMHG | OXYGEN SATURATION: 100 % | WEIGHT: 204.94 LBS | HEIGHT: 66 IN | DIASTOLIC BLOOD PRESSURE: 84 MMHG | BODY MASS INDEX: 32.94 KG/M2

## 2021-03-09 DIAGNOSIS — E03.9 HYPOTHYROIDISM, UNSPECIFIED TYPE: ICD-10-CM

## 2021-03-09 DIAGNOSIS — E66.9 OBESITY (BMI 30.0-34.9): ICD-10-CM

## 2021-03-09 DIAGNOSIS — M85.80 OSTEOPENIA, UNSPECIFIED LOCATION: ICD-10-CM

## 2021-03-09 DIAGNOSIS — I10 ESSENTIAL HYPERTENSION: Primary | ICD-10-CM

## 2021-03-09 DIAGNOSIS — K63.5 BENIGN COLON POLYP: ICD-10-CM

## 2021-03-09 DIAGNOSIS — Z78.0 POSTMENOPAUSAL: ICD-10-CM

## 2021-03-09 DIAGNOSIS — E79.0 HYPERURICEMIA: ICD-10-CM

## 2021-03-09 DIAGNOSIS — Z12.31 OTHER SCREENING MAMMOGRAM: ICD-10-CM

## 2021-03-09 DIAGNOSIS — I10 ESSENTIAL HYPERTENSION: ICD-10-CM

## 2021-03-09 DIAGNOSIS — H40.1231 LOW-TENSION GLAUCOMA OF BOTH EYES, MILD STAGE: ICD-10-CM

## 2021-03-09 LAB
ALBUMIN SERPL BCP-MCNC: 3.3 G/DL (ref 3.5–5.2)
ALP SERPL-CCNC: 73 U/L (ref 55–135)
ALT SERPL W/O P-5'-P-CCNC: 16 U/L (ref 10–44)
ANION GAP SERPL CALC-SCNC: 7 MMOL/L (ref 8–16)
AST SERPL-CCNC: 21 U/L (ref 10–40)
BASOPHILS # BLD AUTO: 0.04 K/UL (ref 0–0.2)
BASOPHILS NFR BLD: 0.7 % (ref 0–1.9)
BILIRUB SERPL-MCNC: 0.7 MG/DL (ref 0.1–1)
BUN SERPL-MCNC: 13 MG/DL (ref 8–23)
CALCIUM SERPL-MCNC: 9 MG/DL (ref 8.7–10.5)
CHLORIDE SERPL-SCNC: 106 MMOL/L (ref 95–110)
CHOLEST SERPL-MCNC: 184 MG/DL (ref 120–199)
CHOLEST/HDLC SERPL: 5.3 {RATIO} (ref 2–5)
CO2 SERPL-SCNC: 30 MMOL/L (ref 23–29)
CREAT SERPL-MCNC: 1 MG/DL (ref 0.5–1.4)
DIFFERENTIAL METHOD: ABNORMAL
EOSINOPHIL # BLD AUTO: 0.2 K/UL (ref 0–0.5)
EOSINOPHIL NFR BLD: 2.7 % (ref 0–8)
ERYTHROCYTE [DISTWIDTH] IN BLOOD BY AUTOMATED COUNT: 13 % (ref 11.5–14.5)
EST. GFR  (AFRICAN AMERICAN): >60 ML/MIN/1.73 M^2
EST. GFR  (NON AFRICAN AMERICAN): 58.4 ML/MIN/1.73 M^2
GLUCOSE SERPL-MCNC: 124 MG/DL (ref 70–110)
HCT VFR BLD AUTO: 41.3 % (ref 37–48.5)
HDLC SERPL-MCNC: 35 MG/DL (ref 40–75)
HDLC SERPL: 19 % (ref 20–50)
HGB BLD-MCNC: 13 G/DL (ref 12–16)
IMM GRANULOCYTES # BLD AUTO: 0.01 K/UL (ref 0–0.04)
IMM GRANULOCYTES NFR BLD AUTO: 0.2 % (ref 0–0.5)
LDLC SERPL CALC-MCNC: 112.4 MG/DL (ref 63–159)
LYMPHOCYTES # BLD AUTO: 1.7 K/UL (ref 1–4.8)
LYMPHOCYTES NFR BLD: 31.1 % (ref 18–48)
MCH RBC QN AUTO: 32.3 PG (ref 27–31)
MCHC RBC AUTO-ENTMCNC: 31.5 G/DL (ref 32–36)
MCV RBC AUTO: 103 FL (ref 82–98)
MONOCYTES # BLD AUTO: 0.4 K/UL (ref 0.3–1)
MONOCYTES NFR BLD: 7.8 % (ref 4–15)
NEUTROPHILS # BLD AUTO: 3.2 K/UL (ref 1.8–7.7)
NEUTROPHILS NFR BLD: 57.5 % (ref 38–73)
NONHDLC SERPL-MCNC: 149 MG/DL
NRBC BLD-RTO: 0 /100 WBC
PLATELET # BLD AUTO: 209 K/UL (ref 150–350)
PMV BLD AUTO: 11.1 FL (ref 9.2–12.9)
POTASSIUM SERPL-SCNC: 3.5 MMOL/L (ref 3.5–5.1)
PROT SERPL-MCNC: 7 G/DL (ref 6–8.4)
RBC # BLD AUTO: 4.03 M/UL (ref 4–5.4)
SODIUM SERPL-SCNC: 143 MMOL/L (ref 136–145)
TRIGL SERPL-MCNC: 183 MG/DL (ref 30–150)
URATE SERPL-MCNC: 4.9 MG/DL (ref 2.4–5.7)
WBC # BLD AUTO: 5.5 K/UL (ref 3.9–12.7)

## 2021-03-09 PROCEDURE — 84550 ASSAY OF BLOOD/URIC ACID: CPT | Performed by: FAMILY MEDICINE

## 2021-03-09 PROCEDURE — 80061 LIPID PANEL: CPT | Performed by: FAMILY MEDICINE

## 2021-03-09 PROCEDURE — 84443 ASSAY THYROID STIM HORMONE: CPT | Performed by: FAMILY MEDICINE

## 2021-03-09 PROCEDURE — 85025 COMPLETE CBC W/AUTO DIFF WBC: CPT | Performed by: FAMILY MEDICINE

## 2021-03-09 PROCEDURE — 80053 COMPREHEN METABOLIC PANEL: CPT | Performed by: FAMILY MEDICINE

## 2021-03-09 PROCEDURE — 99215 PR OFFICE/OUTPT VISIT, EST, LEVL V, 40-54 MIN: ICD-10-PCS | Mod: S$PBB,,, | Performed by: FAMILY MEDICINE

## 2021-03-09 PROCEDURE — 99999 PR PBB SHADOW E&M-EST. PATIENT-LVL IV: ICD-10-PCS | Mod: PBBFAC,,, | Performed by: FAMILY MEDICINE

## 2021-03-09 PROCEDURE — 99215 OFFICE O/P EST HI 40 MIN: CPT | Mod: S$PBB,,, | Performed by: FAMILY MEDICINE

## 2021-03-09 PROCEDURE — 36415 COLL VENOUS BLD VENIPUNCTURE: CPT | Mod: PO | Performed by: FAMILY MEDICINE

## 2021-03-09 PROCEDURE — 99214 OFFICE O/P EST MOD 30 MIN: CPT | Mod: PBBFAC,PO | Performed by: FAMILY MEDICINE

## 2021-03-09 PROCEDURE — 81003 URINALYSIS AUTO W/O SCOPE: CPT | Performed by: FAMILY MEDICINE

## 2021-03-09 PROCEDURE — 99999 PR PBB SHADOW E&M-EST. PATIENT-LVL IV: CPT | Mod: PBBFAC,,, | Performed by: FAMILY MEDICINE

## 2021-03-09 RX ORDER — LEVOTHYROXINE SODIUM 88 UG/1
88 TABLET ORAL EVERY MORNING
Qty: 90 TABLET | Refills: 3 | Status: SHIPPED | OUTPATIENT
Start: 2021-03-09 | End: 2022-03-08 | Stop reason: SDUPTHER

## 2021-03-10 LAB
BILIRUB UR QL STRIP: NEGATIVE
CLARITY UR REFRACT.AUTO: CLEAR
COLOR UR AUTO: YELLOW
GLUCOSE UR QL STRIP: NEGATIVE
HGB UR QL STRIP: NEGATIVE
KETONES UR QL STRIP: NEGATIVE
LEUKOCYTE ESTERASE UR QL STRIP: NEGATIVE
NITRITE UR QL STRIP: NEGATIVE
PH UR STRIP: 6 [PH] (ref 5–8)
PROT UR QL STRIP: NEGATIVE
SP GR UR STRIP: 1.02 (ref 1–1.03)
TSH SERPL DL<=0.005 MIU/L-ACNC: 2.84 UIU/ML (ref 0.4–4)
URN SPEC COLLECT METH UR: NORMAL

## 2021-03-13 DIAGNOSIS — R73.09 ELEVATED GLUCOSE: Primary | ICD-10-CM

## 2021-03-16 ENCOUNTER — LAB VISIT (OUTPATIENT)
Dept: LAB | Facility: HOSPITAL | Age: 66
End: 2021-03-16
Attending: FAMILY MEDICINE
Payer: MEDICARE

## 2021-03-16 DIAGNOSIS — R73.09 ELEVATED GLUCOSE: ICD-10-CM

## 2021-03-16 PROCEDURE — 36415 COLL VENOUS BLD VENIPUNCTURE: CPT | Mod: PO | Performed by: FAMILY MEDICINE

## 2021-03-16 PROCEDURE — 83036 HEMOGLOBIN GLYCOSYLATED A1C: CPT | Performed by: FAMILY MEDICINE

## 2021-03-17 LAB
ESTIMATED AVG GLUCOSE: 105 MG/DL (ref 68–131)
HBA1C MFR BLD: 5.3 % (ref 4–5.6)

## 2021-03-23 ENCOUNTER — PES CALL (OUTPATIENT)
Dept: ADMINISTRATIVE | Facility: CLINIC | Age: 66
End: 2021-03-23

## 2021-04-08 RX ORDER — IRBESARTAN 300 MG/1
300 TABLET ORAL DAILY
Qty: 30 TABLET | Refills: 5 | Status: SHIPPED | OUTPATIENT
Start: 2021-04-08 | End: 2021-10-03 | Stop reason: SDUPTHER

## 2021-04-16 ENCOUNTER — HOSPITAL ENCOUNTER (OUTPATIENT)
Dept: RADIOLOGY | Facility: HOSPITAL | Age: 66
Discharge: HOME | End: 2021-04-16
Attending: PHYSICIAN ASSISTANT
Payer: MEDICARE

## 2021-04-16 ENCOUNTER — PATIENT MESSAGE (OUTPATIENT)
Dept: INTERNAL MEDICINE | Facility: CLINIC | Age: 66
End: 2021-04-16

## 2021-04-16 ENCOUNTER — OFFICE VISIT (OUTPATIENT)
Dept: INTERNAL MEDICINE | Facility: CLINIC | Age: 66
End: 2021-04-16
Payer: MEDICARE

## 2021-04-16 VITALS
SYSTOLIC BLOOD PRESSURE: 126 MMHG | HEART RATE: 67 BPM | DIASTOLIC BLOOD PRESSURE: 76 MMHG | WEIGHT: 203.94 LBS | HEIGHT: 66 IN | TEMPERATURE: 99 F | OXYGEN SATURATION: 99 % | BODY MASS INDEX: 32.78 KG/M2

## 2021-04-16 DIAGNOSIS — M76.62 TENDONITIS, ACHILLES, LEFT: Primary | ICD-10-CM

## 2021-04-16 DIAGNOSIS — M76.62 TENDONITIS, ACHILLES, LEFT: ICD-10-CM

## 2021-04-16 DIAGNOSIS — M54.50 CHRONIC MIDLINE LOW BACK PAIN WITHOUT SCIATICA: ICD-10-CM

## 2021-04-16 DIAGNOSIS — G89.29 CHRONIC MIDLINE LOW BACK PAIN WITHOUT SCIATICA: ICD-10-CM

## 2021-04-16 DIAGNOSIS — I10 ESSENTIAL HYPERTENSION: Chronic | ICD-10-CM

## 2021-04-16 PROCEDURE — 99214 OFFICE O/P EST MOD 30 MIN: CPT | Mod: S$PBB,,, | Performed by: PHYSICIAN ASSISTANT

## 2021-04-16 PROCEDURE — 99999 PR PBB SHADOW E&M-EST. PATIENT-LVL V: ICD-10-PCS | Mod: PBBFAC,,, | Performed by: PHYSICIAN ASSISTANT

## 2021-04-16 PROCEDURE — 73630 X-RAY EXAM OF FOOT: CPT | Mod: 26,LT,, | Performed by: RADIOLOGY

## 2021-04-16 PROCEDURE — 99215 OFFICE O/P EST HI 40 MIN: CPT | Mod: PBBFAC,25 | Performed by: PHYSICIAN ASSISTANT

## 2021-04-16 PROCEDURE — 73630 X-RAY EXAM OF FOOT: CPT | Mod: TC,LT

## 2021-04-16 PROCEDURE — 73630 XR FOOT COMPLETE 3 VIEW LEFT: ICD-10-PCS | Mod: 26,LT,, | Performed by: RADIOLOGY

## 2021-04-16 PROCEDURE — 99214 PR OFFICE/OUTPT VISIT, EST, LEVL IV, 30-39 MIN: ICD-10-PCS | Mod: S$PBB,,, | Performed by: PHYSICIAN ASSISTANT

## 2021-04-16 PROCEDURE — 99999 PR PBB SHADOW E&M-EST. PATIENT-LVL V: CPT | Mod: PBBFAC,,, | Performed by: PHYSICIAN ASSISTANT

## 2021-04-16 RX ORDER — MELOXICAM 15 MG/1
15 TABLET ORAL DAILY PRN
Qty: 90 TABLET | Refills: 0 | Status: SHIPPED | OUTPATIENT
Start: 2021-04-16 | End: 2021-07-12

## 2021-05-17 ENCOUNTER — TELEPHONE (OUTPATIENT)
Dept: FAMILY MEDICINE | Facility: CLINIC | Age: 66
End: 2021-05-17

## 2021-05-17 DIAGNOSIS — J30.2 SEASONAL ALLERGIC RHINITIS, UNSPECIFIED TRIGGER: ICD-10-CM

## 2021-05-18 RX ORDER — MONTELUKAST SODIUM 10 MG/1
10 TABLET ORAL DAILY
Qty: 90 TABLET | Refills: 0 | Status: SHIPPED | OUTPATIENT
Start: 2021-05-18 | End: 2021-08-13

## 2021-05-18 RX ORDER — ALLOPURINOL 100 MG/1
100 TABLET ORAL DAILY
Qty: 90 TABLET | Refills: 0 | Status: SHIPPED | OUTPATIENT
Start: 2021-05-18 | End: 2021-08-13

## 2021-06-11 ENCOUNTER — TELEPHONE (OUTPATIENT)
Dept: FAMILY MEDICINE | Facility: CLINIC | Age: 66
End: 2021-06-11

## 2021-06-21 ENCOUNTER — OFFICE VISIT (OUTPATIENT)
Dept: OPHTHALMOLOGY | Facility: CLINIC | Age: 66
End: 2021-06-21
Payer: MEDICARE

## 2021-06-21 DIAGNOSIS — H40.1231 LOW-TENSION GLAUCOMA OF BOTH EYES, MILD STAGE: Primary | ICD-10-CM

## 2021-06-21 DIAGNOSIS — H02.409: ICD-10-CM

## 2021-06-21 DIAGNOSIS — Z96.1 PSEUDOPHAKIA OF BOTH EYES: ICD-10-CM

## 2021-06-21 PROCEDURE — 99214 PR OFFICE/OUTPT VISIT, EST, LEVL IV, 30-39 MIN: ICD-10-PCS | Mod: S$PBB,,, | Performed by: OPHTHALMOLOGY

## 2021-06-21 PROCEDURE — 99213 OFFICE O/P EST LOW 20 MIN: CPT | Mod: PBBFAC | Performed by: OPHTHALMOLOGY

## 2021-06-21 PROCEDURE — 92133 CPTRZD OPH DX IMG PST SGM ON: CPT | Mod: PBBFAC | Performed by: OPHTHALMOLOGY

## 2021-06-21 PROCEDURE — 99999 PR PBB SHADOW E&M-EST. PATIENT-LVL III: CPT | Mod: PBBFAC,,, | Performed by: OPHTHALMOLOGY

## 2021-06-21 PROCEDURE — 92133 POSTERIOR SEGMENT OCT OPTIC NERVE(OCULAR COHERENCE TOMOGRAPHY) - OU - BOTH EYES: ICD-10-PCS | Mod: 26,S$PBB,, | Performed by: OPHTHALMOLOGY

## 2021-06-21 PROCEDURE — 99999 PR PBB SHADOW E&M-EST. PATIENT-LVL III: ICD-10-PCS | Mod: PBBFAC,,, | Performed by: OPHTHALMOLOGY

## 2021-06-21 PROCEDURE — 99214 OFFICE O/P EST MOD 30 MIN: CPT | Mod: S$PBB,,, | Performed by: OPHTHALMOLOGY

## 2021-07-14 ENCOUNTER — TELEPHONE (OUTPATIENT)
Dept: INTERNAL MEDICINE | Facility: CLINIC | Age: 66
End: 2021-07-14

## 2021-07-15 ENCOUNTER — OFFICE VISIT (OUTPATIENT)
Dept: INTERNAL MEDICINE | Facility: CLINIC | Age: 66
End: 2021-07-15
Payer: MEDICARE

## 2021-07-15 VITALS
TEMPERATURE: 98 F | WEIGHT: 203.94 LBS | DIASTOLIC BLOOD PRESSURE: 96 MMHG | OXYGEN SATURATION: 99 % | SYSTOLIC BLOOD PRESSURE: 140 MMHG | HEART RATE: 74 BPM | BODY MASS INDEX: 32.91 KG/M2

## 2021-07-15 DIAGNOSIS — R49.0 HOARSENESS: ICD-10-CM

## 2021-07-15 DIAGNOSIS — R05.9 COUGH: ICD-10-CM

## 2021-07-15 DIAGNOSIS — J01.80 OTHER ACUTE SINUSITIS, RECURRENCE NOT SPECIFIED: ICD-10-CM

## 2021-07-15 DIAGNOSIS — I10 ESSENTIAL HYPERTENSION: Chronic | ICD-10-CM

## 2021-07-15 PROCEDURE — 99214 OFFICE O/P EST MOD 30 MIN: CPT | Mod: PBBFAC | Performed by: NURSE PRACTITIONER

## 2021-07-15 PROCEDURE — 99214 OFFICE O/P EST MOD 30 MIN: CPT | Mod: S$PBB,,, | Performed by: NURSE PRACTITIONER

## 2021-07-15 PROCEDURE — 99214 PR OFFICE/OUTPT VISIT, EST, LEVL IV, 30-39 MIN: ICD-10-PCS | Mod: S$PBB,,, | Performed by: NURSE PRACTITIONER

## 2021-07-15 PROCEDURE — 99999 PR PBB SHADOW E&M-EST. PATIENT-LVL IV: ICD-10-PCS | Mod: PBBFAC,,, | Performed by: NURSE PRACTITIONER

## 2021-07-15 PROCEDURE — 99999 PR PBB SHADOW E&M-EST. PATIENT-LVL IV: CPT | Mod: PBBFAC,,, | Performed by: NURSE PRACTITIONER

## 2021-07-15 RX ORDER — BENZONATATE 100 MG/1
100 CAPSULE ORAL 3 TIMES DAILY PRN
Qty: 30 CAPSULE | Refills: 0 | Status: SHIPPED | OUTPATIENT
Start: 2021-07-15 | End: 2021-07-25

## 2021-07-15 RX ORDER — FLUTICASONE PROPIONATE 50 MCG
2 SPRAY, SUSPENSION (ML) NASAL DAILY
Qty: 16 G | Refills: 0 | Status: SHIPPED | OUTPATIENT
Start: 2021-07-15 | End: 2021-08-12

## 2021-07-15 RX ORDER — AMOXICILLIN 875 MG/1
875 TABLET, FILM COATED ORAL 2 TIMES DAILY
Qty: 14 TABLET | Refills: 0 | Status: SHIPPED | OUTPATIENT
Start: 2021-07-15 | End: 2021-07-22

## 2021-07-15 RX ORDER — HYDROCHLOROTHIAZIDE 25 MG/1
25 TABLET ORAL DAILY
Qty: 30 TABLET | Refills: 11 | Status: SHIPPED | OUTPATIENT
Start: 2021-07-15 | End: 2022-06-07

## 2021-07-19 ENCOUNTER — OFFICE VISIT (OUTPATIENT)
Dept: OTOLARYNGOLOGY | Facility: CLINIC | Age: 66
End: 2021-07-19
Payer: MEDICARE

## 2021-07-19 VITALS
SYSTOLIC BLOOD PRESSURE: 143 MMHG | DIASTOLIC BLOOD PRESSURE: 81 MMHG | TEMPERATURE: 98 F | BODY MASS INDEX: 32.7 KG/M2 | HEART RATE: 65 BPM | WEIGHT: 202.63 LBS

## 2021-07-19 DIAGNOSIS — K21.9 LARYNGOPHARYNGEAL REFLUX (LPR): ICD-10-CM

## 2021-07-19 DIAGNOSIS — R49.0 HOARSENESS: ICD-10-CM

## 2021-07-19 DIAGNOSIS — R05.9 COUGH: ICD-10-CM

## 2021-07-19 PROCEDURE — 31575 PR LARYNGOSCOPY, FLEXIBLE; DIAGNOSTIC: ICD-10-PCS | Mod: S$PBB,,, | Performed by: OTOLARYNGOLOGY

## 2021-07-19 PROCEDURE — 99214 OFFICE O/P EST MOD 30 MIN: CPT | Mod: PBBFAC,25 | Performed by: OTOLARYNGOLOGY

## 2021-07-19 PROCEDURE — 31575 DIAGNOSTIC LARYNGOSCOPY: CPT | Mod: PBBFAC | Performed by: OTOLARYNGOLOGY

## 2021-07-19 PROCEDURE — 99203 PR OFFICE/OUTPT VISIT, NEW, LEVL III, 30-44 MIN: ICD-10-PCS | Mod: 25,S$PBB,, | Performed by: OTOLARYNGOLOGY

## 2021-07-19 PROCEDURE — 99203 OFFICE O/P NEW LOW 30 MIN: CPT | Mod: 25,S$PBB,, | Performed by: OTOLARYNGOLOGY

## 2021-07-19 PROCEDURE — 99999 PR PBB SHADOW E&M-EST. PATIENT-LVL IV: ICD-10-PCS | Mod: PBBFAC,,, | Performed by: OTOLARYNGOLOGY

## 2021-07-19 PROCEDURE — 99999 PR PBB SHADOW E&M-EST. PATIENT-LVL IV: CPT | Mod: PBBFAC,,, | Performed by: OTOLARYNGOLOGY

## 2021-07-19 PROCEDURE — 31575 DIAGNOSTIC LARYNGOSCOPY: CPT | Mod: S$PBB,,, | Performed by: OTOLARYNGOLOGY

## 2021-07-19 RX ORDER — PANTOPRAZOLE SODIUM 40 MG/1
40 TABLET, DELAYED RELEASE ORAL DAILY
Qty: 30 TABLET | Refills: 11 | Status: SHIPPED | OUTPATIENT
Start: 2021-07-19 | End: 2022-06-19

## 2021-07-27 ENCOUNTER — OFFICE VISIT (OUTPATIENT)
Dept: FAMILY MEDICINE | Facility: CLINIC | Age: 66
End: 2021-07-27
Payer: MEDICARE

## 2021-07-27 VITALS
SYSTOLIC BLOOD PRESSURE: 120 MMHG | WEIGHT: 203.81 LBS | OXYGEN SATURATION: 98 % | BODY MASS INDEX: 32.76 KG/M2 | HEIGHT: 66 IN | TEMPERATURE: 99 F | HEART RATE: 70 BPM | RESPIRATION RATE: 18 BRPM | DIASTOLIC BLOOD PRESSURE: 80 MMHG

## 2021-07-27 DIAGNOSIS — E66.9 OBESITY (BMI 30.0-34.9): ICD-10-CM

## 2021-07-27 DIAGNOSIS — I10 ESSENTIAL HYPERTENSION: Primary | Chronic | ICD-10-CM

## 2021-07-27 PROCEDURE — 99999 PR PBB SHADOW E&M-EST. PATIENT-LVL V: ICD-10-PCS | Mod: PBBFAC,,, | Performed by: REGISTERED NURSE

## 2021-07-27 PROCEDURE — 99215 OFFICE O/P EST HI 40 MIN: CPT | Mod: PBBFAC,PO | Performed by: REGISTERED NURSE

## 2021-07-27 PROCEDURE — 99214 OFFICE O/P EST MOD 30 MIN: CPT | Mod: S$PBB,,, | Performed by: REGISTERED NURSE

## 2021-07-27 PROCEDURE — 99999 PR PBB SHADOW E&M-EST. PATIENT-LVL V: CPT | Mod: PBBFAC,,, | Performed by: REGISTERED NURSE

## 2021-07-27 PROCEDURE — 99214 PR OFFICE/OUTPT VISIT, EST, LEVL IV, 30-39 MIN: ICD-10-PCS | Mod: S$PBB,,, | Performed by: REGISTERED NURSE

## 2021-08-13 DIAGNOSIS — J30.2 SEASONAL ALLERGIC RHINITIS, UNSPECIFIED TRIGGER: ICD-10-CM

## 2021-08-13 RX ORDER — ALLOPURINOL 100 MG/1
TABLET ORAL
Qty: 90 TABLET | Refills: 1 | Status: SHIPPED | OUTPATIENT
Start: 2021-08-13 | End: 2021-11-18

## 2021-08-13 RX ORDER — MONTELUKAST SODIUM 10 MG/1
TABLET ORAL
Qty: 90 TABLET | Refills: 1 | Status: SHIPPED | OUTPATIENT
Start: 2021-08-13 | End: 2022-02-11 | Stop reason: SDUPTHER

## 2021-09-08 ENCOUNTER — OFFICE VISIT (OUTPATIENT)
Dept: OTOLARYNGOLOGY | Facility: CLINIC | Age: 66
End: 2021-09-08
Payer: MEDICARE

## 2021-09-08 VITALS — HEIGHT: 66 IN | TEMPERATURE: 97 F | BODY MASS INDEX: 32.71 KG/M2 | WEIGHT: 203.5 LBS

## 2021-09-08 DIAGNOSIS — R49.0 HOARSENESS: Primary | ICD-10-CM

## 2021-09-08 DIAGNOSIS — K21.9 LARYNGOPHARYNGEAL REFLUX (LPR): ICD-10-CM

## 2021-09-08 PROCEDURE — 99213 OFFICE O/P EST LOW 20 MIN: CPT | Mod: S$PBB,,, | Performed by: OTOLARYNGOLOGY

## 2021-09-08 PROCEDURE — 99999 PR PBB SHADOW E&M-EST. PATIENT-LVL IV: ICD-10-PCS | Mod: PBBFAC,,, | Performed by: OTOLARYNGOLOGY

## 2021-09-08 PROCEDURE — 99213 PR OFFICE/OUTPT VISIT, EST, LEVL III, 20-29 MIN: ICD-10-PCS | Mod: S$PBB,,, | Performed by: OTOLARYNGOLOGY

## 2021-09-08 PROCEDURE — 99999 PR PBB SHADOW E&M-EST. PATIENT-LVL IV: CPT | Mod: PBBFAC,,, | Performed by: OTOLARYNGOLOGY

## 2021-09-08 PROCEDURE — 99214 OFFICE O/P EST MOD 30 MIN: CPT | Mod: PBBFAC | Performed by: OTOLARYNGOLOGY

## 2021-09-09 ENCOUNTER — HOSPITAL ENCOUNTER (OUTPATIENT)
Dept: RADIOLOGY | Facility: HOSPITAL | Age: 66
Discharge: HOME | End: 2021-09-09
Attending: FAMILY MEDICINE
Payer: MEDICARE

## 2021-09-09 ENCOUNTER — OFFICE VISIT (OUTPATIENT)
Dept: FAMILY MEDICINE | Facility: CLINIC | Age: 66
End: 2021-09-09
Payer: MEDICARE

## 2021-09-09 VITALS
BODY MASS INDEX: 32.71 KG/M2 | OXYGEN SATURATION: 99 % | SYSTOLIC BLOOD PRESSURE: 130 MMHG | RESPIRATION RATE: 18 BRPM | DIASTOLIC BLOOD PRESSURE: 82 MMHG | TEMPERATURE: 97 F | WEIGHT: 203.5 LBS | HEART RATE: 69 BPM | HEIGHT: 66 IN

## 2021-09-09 DIAGNOSIS — R47.81 SLURRING OF SPEECH: ICD-10-CM

## 2021-09-09 DIAGNOSIS — M25.562 CHRONIC PAIN OF LEFT KNEE: ICD-10-CM

## 2021-09-09 DIAGNOSIS — M54.50 CHRONIC MIDLINE LOW BACK PAIN WITHOUT SCIATICA: ICD-10-CM

## 2021-09-09 DIAGNOSIS — G89.29 CHRONIC PAIN OF LEFT KNEE: ICD-10-CM

## 2021-09-09 DIAGNOSIS — H40.1231 LOW-TENSION GLAUCOMA OF BOTH EYES, MILD STAGE: ICD-10-CM

## 2021-09-09 DIAGNOSIS — E66.9 OBESITY (BMI 30.0-34.9): ICD-10-CM

## 2021-09-09 DIAGNOSIS — E03.9 HYPOTHYROIDISM, UNSPECIFIED TYPE: ICD-10-CM

## 2021-09-09 DIAGNOSIS — I10 ESSENTIAL HYPERTENSION: Primary | ICD-10-CM

## 2021-09-09 DIAGNOSIS — G89.29 CHRONIC MIDLINE LOW BACK PAIN WITHOUT SCIATICA: ICD-10-CM

## 2021-09-09 DIAGNOSIS — E79.0 HYPERURICEMIA: ICD-10-CM

## 2021-09-09 PROCEDURE — 73562 XR KNEE ORTHO LEFT: ICD-10-PCS | Mod: 26,LT,, | Performed by: RADIOLOGY

## 2021-09-09 PROCEDURE — 99999 PR PBB SHADOW E&M-EST. PATIENT-LVL V: CPT | Mod: PBBFAC,,, | Performed by: FAMILY MEDICINE

## 2021-09-09 PROCEDURE — 99215 OFFICE O/P EST HI 40 MIN: CPT | Mod: PBBFAC,PO | Performed by: FAMILY MEDICINE

## 2021-09-09 PROCEDURE — 99999 PR PBB SHADOW E&M-EST. PATIENT-LVL V: ICD-10-PCS | Mod: PBBFAC,,, | Performed by: FAMILY MEDICINE

## 2021-09-09 PROCEDURE — 99214 PR OFFICE/OUTPT VISIT, EST, LEVL IV, 30-39 MIN: ICD-10-PCS | Mod: S$PBB,,, | Performed by: FAMILY MEDICINE

## 2021-09-09 PROCEDURE — 73562 X-RAY EXAM OF KNEE 3: CPT | Mod: 26,LT,, | Performed by: RADIOLOGY

## 2021-09-09 PROCEDURE — 73560 XR KNEE ORTHO LEFT: ICD-10-PCS | Mod: 26,RT,, | Performed by: RADIOLOGY

## 2021-09-09 PROCEDURE — 99214 OFFICE O/P EST MOD 30 MIN: CPT | Mod: S$PBB,,, | Performed by: FAMILY MEDICINE

## 2021-09-09 PROCEDURE — 73560 X-RAY EXAM OF KNEE 1 OR 2: CPT | Mod: 26,RT,, | Performed by: RADIOLOGY

## 2021-09-09 PROCEDURE — 73560 X-RAY EXAM OF KNEE 1 OR 2: CPT | Mod: TC,FY,PO,RT

## 2021-09-09 RX ORDER — BACLOFEN 10 MG/1
10 TABLET ORAL NIGHTLY PRN
Qty: 30 TABLET | Refills: 2 | Status: SHIPPED | OUTPATIENT
Start: 2021-09-09

## 2021-09-11 ENCOUNTER — PATIENT MESSAGE (OUTPATIENT)
Dept: FAMILY MEDICINE | Facility: CLINIC | Age: 66
End: 2021-09-11

## 2021-09-13 ENCOUNTER — TELEPHONE (OUTPATIENT)
Dept: FAMILY MEDICINE | Facility: CLINIC | Age: 66
End: 2021-09-13

## 2021-09-28 ENCOUNTER — PATIENT MESSAGE (OUTPATIENT)
Dept: FAMILY MEDICINE | Facility: CLINIC | Age: 66
End: 2021-09-28

## 2021-09-29 ENCOUNTER — CLINICAL SUPPORT (OUTPATIENT)
Dept: FAMILY MEDICINE | Facility: CLINIC | Age: 66
End: 2021-09-29
Payer: MEDICARE

## 2021-09-29 PROCEDURE — G0008 ADMIN INFLUENZA VIRUS VAC: HCPCS | Mod: PBBFAC,PO

## 2021-09-29 PROCEDURE — 99999 PR PBB SHADOW E&M-EST. PATIENT-LVL II: CPT | Mod: PBBFAC,,,

## 2021-09-29 PROCEDURE — 90694 VACC AIIV4 NO PRSRV 0.5ML IM: CPT | Mod: PBBFAC,PO

## 2021-09-29 PROCEDURE — 99212 OFFICE O/P EST SF 10 MIN: CPT | Mod: PBBFAC,PO

## 2021-09-29 PROCEDURE — 99999 PR PBB SHADOW E&M-EST. PATIENT-LVL II: ICD-10-PCS | Mod: PBBFAC,,,

## 2021-10-20 ENCOUNTER — PATIENT OUTREACH (OUTPATIENT)
Dept: ADMINISTRATIVE | Facility: OTHER | Age: 66
End: 2021-10-20

## 2021-10-21 ENCOUNTER — OFFICE VISIT (OUTPATIENT)
Dept: OPHTHALMOLOGY | Facility: CLINIC | Age: 66
End: 2021-10-21
Payer: MEDICARE

## 2021-10-21 ENCOUNTER — APPOINTMENT (OUTPATIENT)
Dept: OPHTHALMOLOGY | Facility: CLINIC | Age: 66
End: 2021-10-21
Payer: MEDICARE

## 2021-10-21 DIAGNOSIS — H02.409: ICD-10-CM

## 2021-10-21 DIAGNOSIS — Z96.1 PSEUDOPHAKIA OF BOTH EYES: ICD-10-CM

## 2021-10-21 DIAGNOSIS — H40.1231 LOW-TENSION GLAUCOMA OF BOTH EYES, MILD STAGE: Primary | ICD-10-CM

## 2021-10-21 PROCEDURE — 92083 EXTENDED VISUAL FIELD XM: CPT | Mod: PBBFAC | Performed by: OPHTHALMOLOGY

## 2021-10-21 PROCEDURE — 99999 PR PBB SHADOW E&M-EST. PATIENT-LVL III: ICD-10-PCS | Mod: PBBFAC,,, | Performed by: OPHTHALMOLOGY

## 2021-10-21 PROCEDURE — 92014 COMPRE OPH EXAM EST PT 1/>: CPT | Mod: S$PBB,,, | Performed by: OPHTHALMOLOGY

## 2021-10-21 PROCEDURE — 99213 OFFICE O/P EST LOW 20 MIN: CPT | Mod: PBBFAC | Performed by: OPHTHALMOLOGY

## 2021-10-21 PROCEDURE — 92083 HUMPHREY VISUAL FIELD - OU - BOTH EYES: ICD-10-PCS | Mod: 26,S$PBB,, | Performed by: OPHTHALMOLOGY

## 2021-10-21 PROCEDURE — 92014 PR EYE EXAM, EST PATIENT,COMPREHESV: ICD-10-PCS | Mod: S$PBB,,, | Performed by: OPHTHALMOLOGY

## 2021-10-21 PROCEDURE — 99999 PR PBB SHADOW E&M-EST. PATIENT-LVL III: CPT | Mod: PBBFAC,,, | Performed by: OPHTHALMOLOGY

## 2021-11-26 ENCOUNTER — PATIENT MESSAGE (OUTPATIENT)
Dept: FAMILY MEDICINE | Facility: CLINIC | Age: 66
End: 2021-11-26
Payer: MEDICARE

## 2021-12-02 ENCOUNTER — HOSPITAL ENCOUNTER (EMERGENCY)
Facility: HOSPITAL | Age: 66
Discharge: HOME | End: 2021-12-02
Attending: EMERGENCY MEDICINE
Payer: MEDICARE

## 2021-12-02 ENCOUNTER — OFFICE VISIT (OUTPATIENT)
Dept: OTOLARYNGOLOGY | Facility: CLINIC | Age: 66
End: 2021-12-02
Payer: MEDICARE

## 2021-12-02 VITALS
TEMPERATURE: 98 F | BODY MASS INDEX: 31.31 KG/M2 | OXYGEN SATURATION: 100 % | WEIGHT: 194 LBS | SYSTOLIC BLOOD PRESSURE: 144 MMHG | RESPIRATION RATE: 20 BRPM | HEART RATE: 61 BPM | DIASTOLIC BLOOD PRESSURE: 67 MMHG

## 2021-12-02 VITALS — BODY MASS INDEX: 32.84 KG/M2 | TEMPERATURE: 98 F | WEIGHT: 203.5 LBS

## 2021-12-02 DIAGNOSIS — R13.10 DYSPHAGIA, UNSPECIFIED TYPE: ICD-10-CM

## 2021-12-02 DIAGNOSIS — R29.818 NEUROLOGICAL IMPAIRMENT IN ADULT: ICD-10-CM

## 2021-12-02 DIAGNOSIS — R53.1 WEAKNESS: Primary | ICD-10-CM

## 2021-12-02 DIAGNOSIS — K21.9 LARYNGOPHARYNGEAL REFLUX (LPR): Primary | ICD-10-CM

## 2021-12-02 LAB
ALBUMIN SERPL BCP-MCNC: 4 G/DL (ref 3.5–5.2)
ALP SERPL-CCNC: 76 U/L (ref 55–135)
ALT SERPL W/O P-5'-P-CCNC: 21 U/L (ref 10–44)
ANION GAP SERPL CALC-SCNC: 12 MMOL/L (ref 8–16)
AST SERPL-CCNC: 26 U/L (ref 10–40)
BASOPHILS # BLD AUTO: 0.04 K/UL (ref 0–0.2)
BASOPHILS NFR BLD: 0.6 % (ref 0–1.9)
BILIRUB SERPL-MCNC: 0.9 MG/DL (ref 0.1–1)
BILIRUB UR QL STRIP: NEGATIVE
BUN SERPL-MCNC: 20 MG/DL (ref 8–23)
CALCIUM SERPL-MCNC: 9.6 MG/DL (ref 8.7–10.5)
CHLORIDE SERPL-SCNC: 106 MMOL/L (ref 95–110)
CLARITY UR: CLEAR
CO2 SERPL-SCNC: 25 MMOL/L (ref 23–29)
COLOR UR: YELLOW
CREAT SERPL-MCNC: 1.1 MG/DL (ref 0.5–1.4)
DIFFERENTIAL METHOD: ABNORMAL
EOSINOPHIL # BLD AUTO: 0.2 K/UL (ref 0–0.5)
EOSINOPHIL NFR BLD: 2.5 % (ref 0–8)
ERYTHROCYTE [DISTWIDTH] IN BLOOD BY AUTOMATED COUNT: 12.7 % (ref 11.5–14.5)
EST. GFR  (AFRICAN AMERICAN): 60 ML/MIN/1.73 M^2
EST. GFR  (NON AFRICAN AMERICAN): 52 ML/MIN/1.73 M^2
GLUCOSE SERPL-MCNC: 84 MG/DL (ref 70–110)
GLUCOSE UR QL STRIP: NEGATIVE
HCT VFR BLD AUTO: 39.8 % (ref 37–48.5)
HGB BLD-MCNC: 13 G/DL (ref 12–16)
HGB UR QL STRIP: NEGATIVE
IMM GRANULOCYTES # BLD AUTO: 0.02 K/UL (ref 0–0.04)
IMM GRANULOCYTES NFR BLD AUTO: 0.3 % (ref 0–0.5)
KETONES UR QL STRIP: NEGATIVE
LEUKOCYTE ESTERASE UR QL STRIP: NEGATIVE
LYMPHOCYTES # BLD AUTO: 2.1 K/UL (ref 1–4.8)
LYMPHOCYTES NFR BLD: 33.2 % (ref 18–48)
MCH RBC QN AUTO: 32.2 PG (ref 27–31)
MCHC RBC AUTO-ENTMCNC: 32.7 G/DL (ref 32–36)
MCV RBC AUTO: 99 FL (ref 82–98)
MONOCYTES # BLD AUTO: 0.6 K/UL (ref 0.3–1)
MONOCYTES NFR BLD: 10.2 % (ref 4–15)
NEUTROPHILS # BLD AUTO: 3.4 K/UL (ref 1.8–7.7)
NEUTROPHILS NFR BLD: 53.2 % (ref 38–73)
NITRITE UR QL STRIP: NEGATIVE
NRBC BLD-RTO: 0 /100 WBC
PH UR STRIP: 6 [PH] (ref 5–8)
PLATELET # BLD AUTO: 217 K/UL (ref 150–450)
PMV BLD AUTO: 10 FL (ref 9.2–12.9)
POCT GLUCOSE: 89 MG/DL (ref 70–110)
POTASSIUM SERPL-SCNC: 3.9 MMOL/L (ref 3.5–5.1)
PROT SERPL-MCNC: 7.4 G/DL (ref 6–8.4)
PROT UR QL STRIP: NEGATIVE
RBC # BLD AUTO: 4.04 M/UL (ref 4–5.4)
SODIUM SERPL-SCNC: 143 MMOL/L (ref 136–145)
SP GR UR STRIP: 1.02 (ref 1–1.03)
TSH SERPL DL<=0.005 MIU/L-ACNC: 3.17 UIU/ML (ref 0.4–4)
URN SPEC COLLECT METH UR: NORMAL
UROBILINOGEN UR STRIP-ACNC: NEGATIVE EU/DL
WBC # BLD AUTO: 6.3 K/UL (ref 3.9–12.7)

## 2021-12-02 PROCEDURE — 93005 ELECTROCARDIOGRAM TRACING: CPT

## 2021-12-02 PROCEDURE — 80053 COMPREHEN METABOLIC PANEL: CPT | Performed by: EMERGENCY MEDICINE

## 2021-12-02 PROCEDURE — 85025 COMPLETE CBC W/AUTO DIFF WBC: CPT | Performed by: EMERGENCY MEDICINE

## 2021-12-02 PROCEDURE — 99999 PR PBB SHADOW E&M-EST. PATIENT-LVL III: ICD-10-PCS | Mod: PBBFAC,,, | Performed by: OTOLARYNGOLOGY

## 2021-12-02 PROCEDURE — 81003 URINALYSIS AUTO W/O SCOPE: CPT | Performed by: EMERGENCY MEDICINE

## 2021-12-02 PROCEDURE — 93010 ELECTROCARDIOGRAM REPORT: CPT | Mod: ,,, | Performed by: INTERNAL MEDICINE

## 2021-12-02 PROCEDURE — 99214 PR OFFICE/OUTPT VISIT, EST, LEVL IV, 30-39 MIN: ICD-10-PCS | Mod: 25,S$PBB,, | Performed by: OTOLARYNGOLOGY

## 2021-12-02 PROCEDURE — 99999 PR PBB SHADOW E&M-EST. PATIENT-LVL III: CPT | Mod: PBBFAC,,, | Performed by: OTOLARYNGOLOGY

## 2021-12-02 PROCEDURE — 93010 EKG 12-LEAD: ICD-10-PCS | Mod: ,,, | Performed by: INTERNAL MEDICINE

## 2021-12-02 PROCEDURE — 84443 ASSAY THYROID STIM HORMONE: CPT | Performed by: EMERGENCY MEDICINE

## 2021-12-02 PROCEDURE — 31575 PR LARYNGOSCOPY, FLEXIBLE; DIAGNOSTIC: ICD-10-PCS | Mod: S$PBB,,, | Performed by: OTOLARYNGOLOGY

## 2021-12-02 PROCEDURE — 99285 EMERGENCY DEPT VISIT HI MDM: CPT | Mod: 25,27

## 2021-12-02 PROCEDURE — 99214 OFFICE O/P EST MOD 30 MIN: CPT | Mod: 25,S$PBB,, | Performed by: OTOLARYNGOLOGY

## 2021-12-02 PROCEDURE — 31575 DIAGNOSTIC LARYNGOSCOPY: CPT | Mod: PBBFAC | Performed by: OTOLARYNGOLOGY

## 2021-12-02 PROCEDURE — 99213 OFFICE O/P EST LOW 20 MIN: CPT | Mod: PBBFAC | Performed by: OTOLARYNGOLOGY

## 2021-12-02 PROCEDURE — 31575 DIAGNOSTIC LARYNGOSCOPY: CPT | Mod: S$PBB,,, | Performed by: OTOLARYNGOLOGY

## 2021-12-09 ENCOUNTER — HOSPITAL ENCOUNTER (OUTPATIENT)
Dept: RADIOLOGY | Facility: HOSPITAL | Age: 66
Discharge: HOME | End: 2021-12-09
Attending: OTOLARYNGOLOGY
Payer: MEDICARE

## 2021-12-09 DIAGNOSIS — R13.10 DYSPHAGIA, UNSPECIFIED TYPE: ICD-10-CM

## 2021-12-09 DIAGNOSIS — R13.10 DYSPHAGIA, UNSPECIFIED TYPE: Primary | ICD-10-CM

## 2021-12-09 PROCEDURE — A9698 NON-RAD CONTRAST MATERIALNOC: HCPCS | Performed by: OTOLARYNGOLOGY

## 2021-12-09 PROCEDURE — 25500020 PHARM REV CODE 255: Performed by: OTOLARYNGOLOGY

## 2021-12-09 PROCEDURE — 74230 X-RAY XM SWLNG FUNCJ C+: CPT | Mod: TC

## 2021-12-09 PROCEDURE — 92611 MOTION FLUOROSCOPY/SWALLOW: CPT

## 2021-12-09 RX ADMIN — BARIUM SULFATE 75 ML: 0.81 POWDER, FOR SUSPENSION ORAL at 02:12

## 2021-12-14 ENCOUNTER — OFFICE VISIT (OUTPATIENT)
Dept: NEUROLOGY | Facility: CLINIC | Age: 66
End: 2021-12-14
Payer: MEDICARE

## 2021-12-14 VITALS
BODY MASS INDEX: 30.93 KG/M2 | SYSTOLIC BLOOD PRESSURE: 147 MMHG | HEIGHT: 66 IN | DIASTOLIC BLOOD PRESSURE: 93 MMHG | WEIGHT: 192.44 LBS | HEART RATE: 81 BPM

## 2021-12-14 DIAGNOSIS — R47.81 SLURRING OF SPEECH: Primary | ICD-10-CM

## 2021-12-14 DIAGNOSIS — R26.9 GAIT DISORDER: ICD-10-CM

## 2021-12-14 DIAGNOSIS — H02.403 PTOSIS OF BOTH EYELIDS: ICD-10-CM

## 2021-12-14 PROCEDURE — 99999 PR PBB SHADOW E&M-EST. PATIENT-LVL IV: ICD-10-PCS | Mod: PBBFAC,,, | Performed by: PSYCHIATRY & NEUROLOGY

## 2021-12-14 PROCEDURE — 99999 PR PBB SHADOW E&M-EST. PATIENT-LVL IV: CPT | Mod: PBBFAC,,, | Performed by: PSYCHIATRY & NEUROLOGY

## 2021-12-14 PROCEDURE — 99214 OFFICE O/P EST MOD 30 MIN: CPT | Mod: PBBFAC | Performed by: PSYCHIATRY & NEUROLOGY

## 2021-12-14 PROCEDURE — 99205 PR OFFICE/OUTPT VISIT, NEW, LEVL V, 60-74 MIN: ICD-10-PCS | Mod: S$PBB,,, | Performed by: PSYCHIATRY & NEUROLOGY

## 2021-12-14 PROCEDURE — 99205 OFFICE O/P NEW HI 60 MIN: CPT | Mod: S$PBB,,, | Performed by: PSYCHIATRY & NEUROLOGY

## 2021-12-14 RX ORDER — PYRIDOSTIGMINE BROMIDE 60 MG/1
30 TABLET ORAL EVERY 6 HOURS
Qty: 60 TABLET | Refills: 3 | Status: SHIPPED | OUTPATIENT
Start: 2021-12-14 | End: 2022-07-06

## 2021-12-16 ENCOUNTER — TELEPHONE (OUTPATIENT)
Dept: NEUROLOGY | Facility: CLINIC | Age: 66
End: 2021-12-16
Payer: MEDICARE

## 2021-12-17 ENCOUNTER — PROCEDURE VISIT (OUTPATIENT)
Dept: NEUROLOGY | Facility: CLINIC | Age: 66
End: 2021-12-17
Payer: MEDICARE

## 2021-12-17 ENCOUNTER — PATIENT OUTREACH (OUTPATIENT)
Dept: ADMINISTRATIVE | Facility: OTHER | Age: 66
End: 2021-12-17
Payer: MEDICARE

## 2021-12-17 DIAGNOSIS — M54.16 LUMBAR RADICULOPATHY: ICD-10-CM

## 2021-12-17 DIAGNOSIS — G52.3 HYPOGLOSSAL NEUROPATHY: Primary | ICD-10-CM

## 2021-12-17 DIAGNOSIS — H02.403 PTOSIS OF BOTH EYELIDS: ICD-10-CM

## 2021-12-17 DIAGNOSIS — R47.81 SLURRING OF SPEECH: ICD-10-CM

## 2021-12-17 PROCEDURE — 95937 NEUROMUSCULAR JUNCTION TEST: CPT | Mod: 26,S$PBB,, | Performed by: PSYCHIATRY & NEUROLOGY

## 2021-12-17 PROCEDURE — 95887 MUSC TST DONE W/N TST NONEXT: CPT | Mod: 59,PBBFAC,PO | Performed by: PSYCHIATRY & NEUROLOGY

## 2021-12-17 PROCEDURE — 95937 PR NEUROMUSCULAR JUNCTION TEST: ICD-10-PCS | Mod: 26,S$PBB,, | Performed by: PSYCHIATRY & NEUROLOGY

## 2021-12-17 PROCEDURE — 95886 PR EMG COMPLETE, W/ NERVE CONDUCTION STUDIES, 5+ MUSCLES: ICD-10-PCS | Mod: 26,S$PBB,, | Performed by: PSYCHIATRY & NEUROLOGY

## 2021-12-17 PROCEDURE — 95887 PR MUSC TST DONE W/N TST NONEXT: ICD-10-PCS | Mod: 26,59,S$PBB, | Performed by: PSYCHIATRY & NEUROLOGY

## 2021-12-17 PROCEDURE — 95887 MUSC TST DONE W/N TST NONEXT: CPT | Mod: 26,59,S$PBB, | Performed by: PSYCHIATRY & NEUROLOGY

## 2021-12-17 PROCEDURE — 95937 NEUROMUSCULAR JUNCTION TEST: CPT | Mod: PBBFAC,PO | Performed by: PSYCHIATRY & NEUROLOGY

## 2021-12-17 PROCEDURE — 95886 MUSC TEST DONE W/N TEST COMP: CPT | Mod: 26,S$PBB,, | Performed by: PSYCHIATRY & NEUROLOGY

## 2021-12-17 PROCEDURE — 95911 NRV CNDJ TEST 9-10 STUDIES: CPT | Mod: 26,S$PBB,, | Performed by: PSYCHIATRY & NEUROLOGY

## 2021-12-17 PROCEDURE — 95911 NRV CNDJ TEST 9-10 STUDIES: CPT | Mod: PBBFAC,PO | Performed by: PSYCHIATRY & NEUROLOGY

## 2021-12-17 PROCEDURE — 95885 MUSC TST DONE W/NERV TST LIM: CPT | Mod: 26,59,S$PBB, | Performed by: PSYCHIATRY & NEUROLOGY

## 2021-12-17 PROCEDURE — 95911 PR NERVE CONDUCTION STUDY; 9-10 STUDIES: ICD-10-PCS | Mod: 26,S$PBB,, | Performed by: PSYCHIATRY & NEUROLOGY

## 2021-12-17 PROCEDURE — 95885 MUSC TST DONE W/NERV TST LIM: CPT | Mod: 59,PBBFAC,PO | Performed by: PSYCHIATRY & NEUROLOGY

## 2021-12-17 PROCEDURE — 95886 MUSC TEST DONE W/N TEST COMP: CPT | Mod: PBBFAC,PO | Performed by: PSYCHIATRY & NEUROLOGY

## 2021-12-17 PROCEDURE — 95885 PR MUSC TST DONE W/NERV TST LIM: ICD-10-PCS | Mod: 26,59,S$PBB, | Performed by: PSYCHIATRY & NEUROLOGY

## 2021-12-18 DIAGNOSIS — R00.2 PALPITATIONS: ICD-10-CM

## 2021-12-18 DIAGNOSIS — I10 ESSENTIAL HYPERTENSION: Chronic | ICD-10-CM

## 2021-12-20 ENCOUNTER — PATIENT MESSAGE (OUTPATIENT)
Dept: NEUROLOGY | Facility: CLINIC | Age: 66
End: 2021-12-20
Payer: MEDICARE

## 2021-12-20 DIAGNOSIS — R00.2 PALPITATIONS: ICD-10-CM

## 2021-12-20 DIAGNOSIS — I10 ESSENTIAL HYPERTENSION: Chronic | ICD-10-CM

## 2021-12-20 RX ORDER — ATENOLOL 100 MG/1
TABLET ORAL
Qty: 90 TABLET | Refills: 1 | Status: SHIPPED | OUTPATIENT
Start: 2021-12-20 | End: 2022-03-17 | Stop reason: SDUPTHER

## 2021-12-20 RX ORDER — ATENOLOL 50 MG/1
TABLET ORAL
Qty: 90 TABLET | Refills: 1 | Status: SHIPPED | OUTPATIENT
Start: 2021-12-20 | End: 2022-03-18 | Stop reason: SDUPTHER

## 2021-12-21 ENCOUNTER — HOSPITAL ENCOUNTER (OUTPATIENT)
Dept: RADIOLOGY | Facility: HOSPITAL | Age: 66
Discharge: HOME | End: 2021-12-21
Attending: FAMILY MEDICINE
Payer: MEDICARE

## 2021-12-21 VITALS — HEIGHT: 66 IN | BODY MASS INDEX: 30.86 KG/M2 | WEIGHT: 192 LBS

## 2021-12-21 DIAGNOSIS — Z12.31 OTHER SCREENING MAMMOGRAM: ICD-10-CM

## 2021-12-21 PROCEDURE — 77063 BREAST TOMOSYNTHESIS BI: CPT | Mod: 26,,, | Performed by: RADIOLOGY

## 2021-12-21 PROCEDURE — 77067 MAMMO DIGITAL SCREENING BILAT WITH TOMO: ICD-10-PCS | Mod: 26,,, | Performed by: RADIOLOGY

## 2021-12-21 PROCEDURE — 77067 SCR MAMMO BI INCL CAD: CPT | Mod: TC

## 2021-12-21 PROCEDURE — 77067 SCR MAMMO BI INCL CAD: CPT | Mod: 26,,, | Performed by: RADIOLOGY

## 2021-12-21 PROCEDURE — 77063 MAMMO DIGITAL SCREENING BILAT WITH TOMO: ICD-10-PCS | Mod: 26,,, | Performed by: RADIOLOGY

## 2021-12-29 ENCOUNTER — TELEPHONE (OUTPATIENT)
Dept: RADIOLOGY | Facility: HOSPITAL | Age: 66
End: 2021-12-29
Payer: MEDICARE

## 2022-01-03 ENCOUNTER — HOSPITAL ENCOUNTER (OUTPATIENT)
Dept: RADIOLOGY | Facility: HOSPITAL | Age: 67
Discharge: HOME | End: 2022-01-03
Attending: PSYCHIATRY & NEUROLOGY
Payer: MEDICARE

## 2022-01-03 ENCOUNTER — PATIENT MESSAGE (OUTPATIENT)
Dept: NEUROLOGY | Facility: CLINIC | Age: 67
End: 2022-01-03
Payer: MEDICARE

## 2022-01-03 DIAGNOSIS — R47.81 SLURRING OF SPEECH: ICD-10-CM

## 2022-01-03 DIAGNOSIS — R26.9 GAIT DISORDER: ICD-10-CM

## 2022-01-03 DIAGNOSIS — H02.403 PTOSIS OF BOTH EYELIDS: ICD-10-CM

## 2022-01-03 PROCEDURE — 70551 MRI BRAIN WITHOUT CONTRAST: ICD-10-PCS | Mod: 26,,, | Performed by: RADIOLOGY

## 2022-01-03 PROCEDURE — 70551 MRI BRAIN STEM W/O DYE: CPT | Mod: 26,,, | Performed by: RADIOLOGY

## 2022-01-03 PROCEDURE — 70551 MRI BRAIN STEM W/O DYE: CPT | Mod: TC

## 2022-01-04 ENCOUNTER — OFFICE VISIT (OUTPATIENT)
Dept: NEUROLOGY | Facility: CLINIC | Age: 67
End: 2022-01-04
Payer: MEDICARE

## 2022-01-04 ENCOUNTER — LAB VISIT (OUTPATIENT)
Dept: LAB | Facility: HOSPITAL | Age: 67
End: 2022-01-04
Attending: PSYCHIATRY & NEUROLOGY
Payer: MEDICARE

## 2022-01-04 VITALS
WEIGHT: 193 LBS | DIASTOLIC BLOOD PRESSURE: 89 MMHG | HEIGHT: 66 IN | SYSTOLIC BLOOD PRESSURE: 135 MMHG | BODY MASS INDEX: 31.02 KG/M2 | HEART RATE: 60 BPM

## 2022-01-04 DIAGNOSIS — H02.423 ACQUIRED MYOGENIC PTOSIS OF BOTH EYELIDS: ICD-10-CM

## 2022-01-04 DIAGNOSIS — R26.9 ABNORMAL GAIT: ICD-10-CM

## 2022-01-04 DIAGNOSIS — H02.423 ACQUIRED MYOGENIC PTOSIS OF BOTH EYELIDS: Primary | ICD-10-CM

## 2022-01-04 DIAGNOSIS — R25.2 SPASTICITY: ICD-10-CM

## 2022-01-04 DIAGNOSIS — R47.1 DYSARTHRIA: ICD-10-CM

## 2022-01-04 LAB
ALBUMIN SERPL BCP-MCNC: 3.9 G/DL (ref 3.5–5.2)
ALP SERPL-CCNC: 67 U/L (ref 55–135)
ALT SERPL W/O P-5'-P-CCNC: 24 U/L (ref 10–44)
ANION GAP SERPL CALC-SCNC: 9 MMOL/L (ref 8–16)
AST SERPL-CCNC: 27 U/L (ref 10–40)
BASOPHILS # BLD AUTO: 0.05 K/UL (ref 0–0.2)
BASOPHILS NFR BLD: 0.9 % (ref 0–1.9)
BILIRUB SERPL-MCNC: 0.9 MG/DL (ref 0.1–1)
BUN SERPL-MCNC: 25 MG/DL (ref 8–23)
CALCIUM SERPL-MCNC: 10 MG/DL (ref 8.7–10.5)
CERULOPLASMIN SERPL-MCNC: 33 MG/DL (ref 15–45)
CHLORIDE SERPL-SCNC: 104 MMOL/L (ref 95–110)
CK SERPL-CCNC: 365 U/L (ref 20–180)
CO2 SERPL-SCNC: 27 MMOL/L (ref 23–29)
CREAT SERPL-MCNC: 1.1 MG/DL (ref 0.5–1.4)
DIFFERENTIAL METHOD: ABNORMAL
EOSINOPHIL # BLD AUTO: 0.1 K/UL (ref 0–0.5)
EOSINOPHIL NFR BLD: 1.9 % (ref 0–8)
ERYTHROCYTE [DISTWIDTH] IN BLOOD BY AUTOMATED COUNT: 12.6 % (ref 11.5–14.5)
EST. GFR  (AFRICAN AMERICAN): 59.6 ML/MIN/1.73 M^2
EST. GFR  (NON AFRICAN AMERICAN): 51.7 ML/MIN/1.73 M^2
FOLATE SERPL-MCNC: 8.3 NG/ML (ref 4–24)
GLUCOSE SERPL-MCNC: 90 MG/DL (ref 70–110)
HCT VFR BLD AUTO: 40.8 % (ref 37–48.5)
HGB BLD-MCNC: 13.2 G/DL (ref 12–16)
IMM GRANULOCYTES # BLD AUTO: 0.01 K/UL (ref 0–0.04)
IMM GRANULOCYTES NFR BLD AUTO: 0.2 % (ref 0–0.5)
LYMPHOCYTES # BLD AUTO: 2.1 K/UL (ref 1–4.8)
LYMPHOCYTES NFR BLD: 36.1 % (ref 18–48)
MCH RBC QN AUTO: 32.4 PG (ref 27–31)
MCHC RBC AUTO-ENTMCNC: 32.4 G/DL (ref 32–36)
MCV RBC AUTO: 100 FL (ref 82–98)
MONOCYTES # BLD AUTO: 0.5 K/UL (ref 0.3–1)
MONOCYTES NFR BLD: 9.1 % (ref 4–15)
NEUTROPHILS # BLD AUTO: 3 K/UL (ref 1.8–7.7)
NEUTROPHILS NFR BLD: 51.8 % (ref 38–73)
NRBC BLD-RTO: 0 /100 WBC
PLATELET # BLD AUTO: 198 K/UL (ref 150–450)
PMV BLD AUTO: 10.8 FL (ref 9.2–12.9)
POTASSIUM SERPL-SCNC: 3.6 MMOL/L (ref 3.5–5.1)
PROT SERPL-MCNC: 7.9 G/DL (ref 6–8.4)
RBC # BLD AUTO: 4.08 M/UL (ref 4–5.4)
SODIUM SERPL-SCNC: 140 MMOL/L (ref 136–145)
VIT B12 SERPL-MCNC: 293 PG/ML (ref 210–950)
WBC # BLD AUTO: 5.74 K/UL (ref 3.9–12.7)

## 2022-01-04 PROCEDURE — 99999 PR PBB SHADOW E&M-EST. PATIENT-LVL IV: CPT | Mod: PBBFAC,,, | Performed by: PSYCHIATRY & NEUROLOGY

## 2022-01-04 PROCEDURE — 99215 OFFICE O/P EST HI 40 MIN: CPT | Mod: S$PBB,,, | Performed by: PSYCHIATRY & NEUROLOGY

## 2022-01-04 PROCEDURE — 82390 ASSAY OF CERULOPLASMIN: CPT | Performed by: PSYCHIATRY & NEUROLOGY

## 2022-01-04 PROCEDURE — 86334 IMMUNOFIX E-PHORESIS SERUM: CPT | Mod: 26,,, | Performed by: PATHOLOGY

## 2022-01-04 PROCEDURE — 80053 COMPREHEN METABOLIC PANEL: CPT | Performed by: PSYCHIATRY & NEUROLOGY

## 2022-01-04 PROCEDURE — 85025 COMPLETE CBC W/AUTO DIFF WBC: CPT | Performed by: PSYCHIATRY & NEUROLOGY

## 2022-01-04 PROCEDURE — 84165 PATHOLOGIST INTERPRETATION SPE: ICD-10-PCS | Mod: 26,,, | Performed by: PATHOLOGY

## 2022-01-04 PROCEDURE — 82550 ASSAY OF CK (CPK): CPT | Performed by: PSYCHIATRY & NEUROLOGY

## 2022-01-04 PROCEDURE — 99417 PROLNG OP E/M EACH 15 MIN: CPT | Mod: S$PBB,,, | Performed by: PSYCHIATRY & NEUROLOGY

## 2022-01-04 PROCEDURE — 99417 PR PROLONGED SVC, OUTPT, W/WO DIRECT PT CONTACT,  EA ADDTL 15 MIN: ICD-10-PCS | Mod: S$PBB,,, | Performed by: PSYCHIATRY & NEUROLOGY

## 2022-01-04 PROCEDURE — 99215 PR OFFICE/OUTPT VISIT, EST, LEVL V, 40-54 MIN: ICD-10-PCS | Mod: S$PBB,,, | Performed by: PSYCHIATRY & NEUROLOGY

## 2022-01-04 PROCEDURE — 81312 PABPN1 GENE DETC ABNOR ALLEL: CPT | Performed by: PSYCHIATRY & NEUROLOGY

## 2022-01-04 PROCEDURE — 99999 PR PBB SHADOW E&M-EST. PATIENT-LVL IV: ICD-10-PCS | Mod: PBBFAC,,, | Performed by: PSYCHIATRY & NEUROLOGY

## 2022-01-04 PROCEDURE — 36415 COLL VENOUS BLD VENIPUNCTURE: CPT | Mod: PO | Performed by: PSYCHIATRY & NEUROLOGY

## 2022-01-04 PROCEDURE — 84165 PROTEIN E-PHORESIS SERUM: CPT | Mod: 26,,, | Performed by: PATHOLOGY

## 2022-01-04 PROCEDURE — 84165 PROTEIN E-PHORESIS SERUM: CPT | Performed by: PSYCHIATRY & NEUROLOGY

## 2022-01-04 PROCEDURE — 86255 FLUORESCENT ANTIBODY SCREEN: CPT | Performed by: PSYCHIATRY & NEUROLOGY

## 2022-01-04 PROCEDURE — 82607 VITAMIN B-12: CPT | Performed by: PSYCHIATRY & NEUROLOGY

## 2022-01-04 PROCEDURE — 86334 IMMUNOFIX E-PHORESIS SERUM: CPT | Performed by: PSYCHIATRY & NEUROLOGY

## 2022-01-04 PROCEDURE — 86334 PATHOLOGIST INTERPRETATION IFE: ICD-10-PCS | Mod: 26,,, | Performed by: PATHOLOGY

## 2022-01-04 PROCEDURE — 99214 OFFICE O/P EST MOD 30 MIN: CPT | Mod: PBBFAC,PO | Performed by: PSYCHIATRY & NEUROLOGY

## 2022-01-04 PROCEDURE — 82746 ASSAY OF FOLIC ACID SERUM: CPT | Performed by: PSYCHIATRY & NEUROLOGY

## 2022-01-04 PROCEDURE — 30000890 MISCELLANEOUS SENDOUT TEST, BLOOD: Performed by: PSYCHIATRY & NEUROLOGY

## 2022-01-04 PROCEDURE — 82525 ASSAY OF COPPER: CPT | Performed by: PSYCHIATRY & NEUROLOGY

## 2022-01-04 PROCEDURE — 84425 ASSAY OF VITAMIN B-1: CPT | Performed by: PSYCHIATRY & NEUROLOGY

## 2022-01-04 NOTE — PROGRESS NOTES
NEUROLOGY  Outpatient CONSULT  [facility]  [address] [facility] [address], [place] [address]  [phone] (office) / [phone] (fax)    Patient Name:  Sophia Hope  :  1955  MR #:  [id number]  Acct #:  [id number]    Date of Neurology Consult: 2022  Name of Neurologist: Xi Frank D.O, ABPN, AOBNP, ABEM    Other Physicians:  Tamela Lance MD (Primary Care Physician); Pete Case MD (Referring)      Chief Complaint: Gait Problem, Slurred speech, and Extremity Weakness (Left lower)      History of Present Illness (HPI):  Sophia Hope is a 66 y.o. female referred by her neurologist for further evaluation of dysarthria.    She states her speech changed around May or 2021, possibly as far back as April.  She states there was some slowing of her speech and some hoarseness.  She was diagnosed with reflux at the time and started on a stomach medication.  She thought it would improve but it did not.  She does not feel that the speech has changed since then.  She has noticed that her voice has changed.  She has noticed that she sounds like she can't get air or she is short of breath.  She does not feel short of breath.  She states sometimes when she is swallowing, she will have to stop because she feels like there is a lack of air.      She has not noticed a drastic change in her facial appearance, but had noted that in  she had drooping of the eyelids, worse on the left.     She was seen by the eye doctor.  They had suggested she might need surgery for her eyelids.  She has not had eye surgery for ptosis at this time.    She was seen by the ENT in Dec 2021.  She was advised to go to the hospital for further workup.  They did a CT brain and it was normal.  They advised a swallowing test.      She was then referred to neurology.  At that appointment she noted left leg weakness.  She had no pain.  There was no numbness.  She felt her walking had changed and  she was unbalanced.  She got herself a cane around this time due to her balance concerns.      She has not done any physical therapy or speech therapy.  Her swallow study was unremarkable.    She has no family members with symptoms like this.  She is the youngest of her family.  She has no family history of alzheimers, parkinson's, et.    Sometimes she has pain at the left TMJ with motion.  Not necessarily tender to palpation.    She feels like when she walks she is a little knock-kneed.    Treatment to date:    Mestinon - no benefit    Review of Systems:   General: Weight gain: No, Weight Loss: Yes, Fatigue: No,   Fever: No, Chills: No, Night Sweats: No, Insomnia: No, Excessive sleeping: No   Respiratory:  Cough: No, Shortness of Breath: No,   Wheezing: No, Excessive Snoring: No, Coughing up blood: No  Endocrine: Heat Intolerance: No, Cold Intolerance: No,   Excessive Thirst: No, Excessive Hunger: No,   Eyes:  Blurred Vision: No, Double Vision: No,   Light Sensitivity: Yes, Eye pain: No  Musculoskeletal: Muscle Aches/Pain: No, Joint Pain/Swelling: No, Muscle Cramps: No, Muscle Weakness: Yes, Neck Pain: Yes, Back Pain: Yes   Neurological: Difficulty Walking/Falls: Yes, Headache Migraine: No, Dizziness/Vertigo: No, Fainting: No, Difficulty with Speech: Yes, Weakness: No, Tingling/Numbness: No, Tremors: No, Memory Problems: No, Seizures: No, Difficulty Swallowing: Yes, Altered Taste: No.  Cardiovascular: Chest Pain: No, Shortness of Breath: No,   Palpitations: No,  Gastrointestinal: Nausea/Vomiting: No, Constipation: Yes, Diarrhea: No, Bloody Stools: No   Psych/Cog:  Depression: No, Anxiety: No, Hallucinations: No, Problems Concentrating: No  : Frequent Urination: No, Incontinence: No, Blood of Urine: No, Urinary Infections: No, Changes in Sex Drive: No   ENT:Hearing Loss: No, Earache: No, Ringing in Ears: No,   Facial Pain: No, Chronic Congestion: No   Immune: Seasonal Allergies: Yes, Hives and/or Rashes: No  The  remainder of the review of twelve body systems was reviewed and normal.    Past Medical, Surgical, Family & Social History:   Past Medical History:   Diagnosis Date    Arthritis     Benign colon polyp     Cataract     Diverticulosis     Glaucoma (increased eye pressure)     History of abnormal Pap smear     History of herpes zoster     Right flank 4/30/12    Hypertension     Hypothyroidism     Osteopenia     Postmenopausal      Past Surgical History:   Procedure Laterality Date    BTL      CATARACT EXTRACTION W/  INTRAOCULAR LENS IMPLANT Left 12/19/2018    w/ ISTENT(INJ)    CATARACT EXTRACTION W/  INTRAOCULAR LENS IMPLANT Right 07/25/2018    W/ ISTENT    CERVIX LESION DESTRUCTION      Abnormal PAP    COLONOSCOPY N/A 5/29/2019    Procedure: COLONOSCOPY;  Surgeon: To Clay MD;  [facility];  Service: Endoscopy;  Laterality: N/A;    hysteroscopic ablation      I-STENT Right 07/25/2018    DR. HENRY    PCIOL Right 07/25/2018    DR. HENRY    GA DILATION/CURETTAGE,DIAGNOSTIC      D & C    TONSILLECTOMY       Family History   Problem Relation Age of Onset    Cancer Mother         stomach cancer    Cataracts Mother     Hypertension Sister     Cancer Sister         liver cancer    Hypertension Sister     Heart disease Brother         MI/CAD    Breast cancer Daughter     No Known Problems Son     Diabetes Neg Hx     Stroke Neg Hx     Blindness Neg Hx     Glaucoma Neg Hx     Macular degeneration Neg Hx     Retinal detachment Neg Hx     Strabismus Neg Hx     Thyroid disease Neg Hx      Alcohol use:  reports no history of alcohol use.   (Of note, 0.6 oz = 1 beer or 6 oz = 10 beers).  Tobacco use:  reports that she quit smoking about 38 years ago. Her smoking use included cigarettes. She quit after 5.00 years of use. She has never used smokeless tobacco.  Street drug use:  reports no history of drug use.  Allergies: Clonidine and Cardizem [diltiazem hcl].    Home Medications:  "    Current Outpatient Medications:     allopurinoL (ZYLOPRIM) 100 MG tablet, TAKE 1 TABLET(100 MG) BY MOUTH EVERY DAY, Disp: 90 tablet, Rfl: 1    atenoloL (TENORMIN) 100 MG tablet, TAKE 1 TABLET(100 MG) BY MOUTH EVERY DAY, Disp: 90 tablet, Rfl: 1    atenoloL (TENORMIN) 50 MG tablet, TAKE 1 TABLET(50 MG) BY MOUTH EVERY DAY, Disp: 90 tablet, Rfl: 1    baclofen (LIORESAL) 10 MG tablet, Take 1 tablet (10 mg total) by mouth nightly as needed. For back pain, Disp: 30 tablet, Rfl: 2    calcium carbonate-vitamin D2 600 mg calcium- 200 unit Cap, Take 1 tablet by mouth Twice daily., Disp: , Rfl:     COMBIGAN 0.2-0.5 % Drop, INSTILL 1 DROP IN BOTH EYES TWICE DAILY, Disp: 10 mL, Rfl: 12    dextran 70-hypromellose (TEARS) ophthalmic solution, Place 1 drop into both eyes. BOTH EYES PRN , Disp: , Rfl:     hydroCHLOROthiazide (HYDRODIURIL) 25 MG tablet, Take 1 tablet (25 mg total) by mouth once daily., Disp: 30 tablet, Rfl: 11    irbesartan (AVAPRO) 300 MG tablet, TAKE 1 TABLET(300 MG) BY MOUTH EVERY DAY, Disp: 30 tablet, Rfl: 5    latanoprost 0.005 % ophthalmic solution, INSTILL 1 DROP IN BOTH EYES EVERY DAY AT BEDTIME, Disp: 2.5 mL, Rfl: 12    levothyroxine (SYNTHROID) 88 MCG tablet, Take 1 tablet (88 mcg total) by mouth every morning., Disp: 90 tablet, Rfl: 3    meloxicam (MOBIC) 15 MG tablet, TAKE 1 TABLET(15 MG) BY MOUTH DAILY AS NEEDED, Disp: 90 tablet, Rfl: 0    montelukast (SINGULAIR) 10 mg tablet, TAKE 1 TABLET(10 MG) BY MOUTH EVERY DAY, Disp: 90 tablet, Rfl: 1    pantoprazole (PROTONIX) 40 MG tablet, Take 1 tablet (40 mg total) by mouth once daily., Disp: 30 tablet, Rfl: 11    pyridostigmine (MESTINON) 60 mg Tab, Take 0.5 tablets (30 mg total) by mouth every 6 (six) hours. (Patient not taking: Reported on 1/4/2022), Disp: 60 tablet, Rfl: 3    Physical Examination:  /89 (BP Location: Right arm, Patient Position: Sitting, BP Method: Small (Automatic))   Pulse 60   Ht 5' 6" (1.676 m)   Wt 87.5 kg " (193 lb 0.2 oz)   BMI 31.15 kg/m²     GENERAL:  General appearance: Well, non-toxic appearing.  No apparent distress.  Fundi exam: normal.  Neck: supple.  Carotid auscultation: normal.  Heart auscultation: normal.  Peripheral pulses: normal.  Extremities: normal.    MENTAL STATUS:  Alertness, attention span & concentration: normal.  Language: normal.  Orientation to self, place & time:  normal.  Memory, recent & remote: normal.  Fund of knowledge: normal.    SPEECH:  Dysarthric and fluent.  Follows complex commands.    CRANIAL NERVES:  Cranial Nerves II-XII were examined.  II - Visual fields: normal.  III, IV, VI: PERRL, EOMI, bilateral ptosis, No nystagmus.  V - Facial sensation: normal.  VII - Face symmetry & mobility: normal.  VIII - Hearing: normal.  IX, X - Palate: mobile & midline.  XI - Shoulder shrug: normal.  XII - Tongue protrusion: normal.    GROSS MOTOR:  Gait & station: slightly spastic appearing gait.  Tone: normal.  Abnormal movements: none.  Finger-nose & Heel-knee-shin: normal.  Rapid alternating movements & drift: normal.  Romberg: absent    MUSCLE STRENGTH:     Fascics Atrophy RIGHT    LEFT Atrophy Fascics     5 Neck Ext. 5       5 Neck Flex 5       5 Deltoids 5       5 Sh.Ext.Rot. 5       5 Sh.Int.Rot. 5       5 Biceps 5       5 Triceps 5       5 Forearm.Pr. 5       5 Wrist Ext. 5       5 Wrist Flex 5       5 Finger Ext. 5       5 Finger Flex 5       5 FPL 5       5 Inteross. 5                         5 Iliopsoas 5       5 Hip Abduct 5       5 Hip Adduct 5       5 Quads 5       5 Hams 5       5 Dorsiflex 5       5 Plantar Flex 5       5 Ankle Jared 5       5 Ankle Invert 5       5 Toe Ext. 5       5 Toe Flex 5                         REFLEXES:    RIGHT Reflex   LEFT   2++ Biceps 2++   2++ Brachiorad. 2++   2++ Triceps 2++    Pectoralis     Jaw Jerk     Knowles's         2+ Patellar 2+   2+ Ankle 2+    Suprapatellar              Down PLANTAR Down         SENSORY:  Light touch: Normal  throughout.  Sharp touch: Normal throughout.  Vibration: Normal throughout.    Diagnostic Data Reviewed:   MRI brain 1/3/22:  No acute abnormality  Moderate degree of scattered nonspecific T2/FLAIR hyperintense signal throughout the supratentorial white matter which most likely represents sequela of chronic microvascular ischemic disease.No acute abnormality     EMG 12/17/21:  This is an abnormal EMG of the right upper and lower extremities.  Due to the concerns listed in the history above, additional testing was added to include repetitive nerve stimulation as well as EMG examination of the tongue, paraspinal muscles, and left lower extremity.  The findings are as follows:  1.  Slow repetitive stimulation of the median and facial nerves at rest and following exercise is normal.  There is no evidence, by repetitive stimulations, of a neuromuscular junction defect of the postsynaptic type, as seen with myasthenia gravis.     2. There are focal changes seen in the left lower extremity most consistent with a left L5 radiculopathy, clinical correlation is recommended.   3. The chronic, borderline changes noted in the tongue muscles are consistent with bilateral hypoglossal neuropathy.  This neuropathy can be a symptom of progressive bulbar palsy.  This study does not meet el Escorial criteria for motor neuron disease.  4. The low amplitude noted in the right extensor digitorum brevis muscle appears to be a normal variant of disuse atrophy.  There are no associated neurogenic changes.  There is no evidence of a peripheral neuropathy, plexopathy, or radiculopathy on this study.  In addition, there is no evidence of myopathy or neuromuscular junction disorder on this study.    Swallow study 12/9/21:  Pt presents with a safe functional swallow across tested consistencies.  No aspiration or penetration was observed and her swallow strength and coordination was adequate.       Assessment and Plan:  Sophia Hope is a 66  y.o., woman with persistent dysarthria.  She was initially evaluated for myasthenia gravis because the were some ocular concerns as well.  Her myasthenia panel was negative and an EMG revealed no issues with the neuromuscular junction.  Her EMG did show neurogenic changes of the tongue which could be suggestive of a hypoglossal neuropathy however this would be unusual to be present bilaterally.  Thus other conditions need to be assessed.    She has a slightly spastic appearing gait.  The etiology is unclear at this time.  This could be related to her above symptoms.  Will likely need to pursue MRI of the spine if no answers from her pending blood work.    Bulbar onset motor neuron disease is always a concern in these scenarios, unfortunately there is no specific test for this.  We discussed today that further workup will be helpful to rule out other conditions that may be causing these symptoms.  Given her reports of bilateral eyelid ptosis in addition 2 dysarthria condition such as ocular pharyngeal muscular dystrophy should be considered.  This can have an older age of onset with slow progression.    Labs will be sent today for neuropathies, 0PMD, and other conditions that may affect the muscle.    Important to note, also  has a past medical history of Arthritis, Benign colon polyp, Cataract, Diverticulosis, Glaucoma (increased eye pressure), History of abnormal Pap smear, History of herpes zoster, Hypertension, Hypothyroidism, Osteopenia, and Postmenopausal.    Time Spent: I spent a total of 80 minutes on the day of the visit.This includes face to face time and non-face to face time preparing to see the patient (eg, review of tests), Obtaining and/or reviewing separately obtained history, Documenting clinical information in the electronic or other health record, Independently interpreting resultsand communicating results to the patient/family/caregiver, or Care coordination.       Xi Frank D.O, ABPN,  JESSICA JUAREZ    This note was created with voice recognition software.  Grammatical, syntax and spelling errors may be inevitable.

## 2022-01-05 LAB
ALBUMIN SERPL ELPH-MCNC: 3.93 G/DL (ref 3.35–5.55)
ALPHA1 GLOB SERPL ELPH-MCNC: 0.36 G/DL (ref 0.17–0.41)
ALPHA2 GLOB SERPL ELPH-MCNC: 0.87 G/DL (ref 0.43–0.99)
B-GLOBULIN SERPL ELPH-MCNC: 0.98 G/DL (ref 0.5–1.1)
GAMMA GLOB SERPL ELPH-MCNC: 1.25 G/DL (ref 0.67–1.58)
INTERPRETATION SERPL IFE-IMP: NORMAL
PROT SERPL-MCNC: 7.4 G/DL (ref 6–8.4)

## 2022-01-06 LAB
PATHOLOGIST INTERPRETATION IFE: NORMAL
PATHOLOGIST INTERPRETATION SPE: NORMAL

## 2022-01-07 LAB
ANCA AB TITR SER IF: NORMAL TITER
COPPER SERPL-MCNC: 1425 UG/L (ref 810–1990)
P-ANCA TITR SER IF: NORMAL TITER

## 2022-01-10 LAB
DEPRECATED GD1B DISIALYL IGG SER QL: NORMAL
DEPRECATED GD1B DISIALYL IGM SER QL: NORMAL
GM1 ASIALO IGG SER QL: NORMAL
GM1 ASIALO IGM SER QL: NORMAL
GM1 GANGL IGG SER QL: NORMAL
GM1 GANGL IGM SER QL: NORMAL
VIT B1 BLD-MCNC: 77 UG/L (ref 38–122)

## 2022-01-12 ENCOUNTER — TELEPHONE (OUTPATIENT)
Dept: NEUROLOGY | Facility: CLINIC | Age: 67
End: 2022-01-12
Payer: MEDICARE

## 2022-01-12 DIAGNOSIS — R47.1 DYSARTHRIA: ICD-10-CM

## 2022-01-12 DIAGNOSIS — H02.423 ACQUIRED MYOGENIC PTOSIS OF BOTH EYELIDS: ICD-10-CM

## 2022-01-12 DIAGNOSIS — R25.2 SPASTICITY: ICD-10-CM

## 2022-01-12 DIAGNOSIS — R47.81 SLURRING OF SPEECH: Primary | ICD-10-CM

## 2022-01-12 NOTE — TELEPHONE ENCOUNTER
----- Message from Francheska Felix sent at 1/12/2022 10:36 AM CST -----  There was an issue with the ganglioside test ordered  01/04/2022.  The specimen was quantity not sufficient.  The ordered has been cancelled and will need to be reordered and recollected.  If there are any questions please call the sendout laboratory. Anyone in the sendout lab will be able to assist you.

## 2022-01-12 NOTE — TELEPHONE ENCOUNTER
Lab reordered. Waiting to make sure other labs come back without issue. Will call patient to inform once all labs in. Dr. Frank aware.

## 2022-01-13 ENCOUNTER — PATIENT MESSAGE (OUTPATIENT)
Dept: NEUROLOGY | Facility: CLINIC | Age: 67
End: 2022-01-13
Payer: MEDICARE

## 2022-01-13 LAB
AMPHIPHYSIN AB TITR SER: NEGATIVE TITER
CV2 IGG TITR SER: NEGATIVE TITER
GLIAL NUC TYPE 1 AB TITR SER: NEGATIVE TITER
HU1 AB TITR SER: NEGATIVE TITER
HU2 AB TITR SER IF: NEGATIVE TITER
HU3 AB TITR SER: NEGATIVE TITER
IMMUNOLOGIST REVIEW: NORMAL
NACHR AB SER-SCNC: 0 NMOL/L
PAVAL REFLEX TEST ADDED: NORMAL
PCA-1 AB TITR SER: NEGATIVE TITER
PCA-TR AB TITR SER: NEGATIVE TITER
PURKINJE CELL CYTOPLASMIC AB TYPE2: NEGATIVE TITER
VGCC-P/Q BIND AB SER-SCNC: 0 NMOL/L
VGKC AB SER-SCNC: 0 NMOL/L

## 2022-01-15 ENCOUNTER — PATIENT MESSAGE (OUTPATIENT)
Dept: ADMINISTRATIVE | Facility: OTHER | Age: 67
End: 2022-01-15
Payer: MEDICARE

## 2022-01-21 ENCOUNTER — PATIENT MESSAGE (OUTPATIENT)
Dept: NEUROLOGY | Facility: CLINIC | Age: 67
End: 2022-01-21
Payer: MEDICARE

## 2022-01-21 ENCOUNTER — TELEPHONE (OUTPATIENT)
Dept: NEUROLOGY | Facility: CLINIC | Age: 67
End: 2022-01-21
Payer: MEDICARE

## 2022-01-21 LAB
MISCELLANEOUS TEST NAME: NORMAL
REFERENCE LAB: NORMAL
SPECIMEN TYPE: NORMAL
TEST RESULT: NORMAL

## 2022-01-21 NOTE — TELEPHONE ENCOUNTER
----- Message from Kaylan Cheng LPN sent at 1/12/2022 11:28 AM CST -----  Make sure other labs come back without issue then  let patient know this lab needs to be reordered.

## 2022-01-22 ENCOUNTER — PATIENT MESSAGE (OUTPATIENT)
Dept: ADMINISTRATIVE | Facility: OTHER | Age: 67
End: 2022-01-22
Payer: MEDICARE

## 2022-01-24 ENCOUNTER — LAB VISIT (OUTPATIENT)
Dept: LAB | Facility: HOSPITAL | Age: 67
End: 2022-01-24
Attending: PSYCHIATRY & NEUROLOGY
Payer: MEDICARE

## 2022-01-24 DIAGNOSIS — R25.2 SPASTICITY: ICD-10-CM

## 2022-01-24 DIAGNOSIS — R47.81 SLURRING OF SPEECH: ICD-10-CM

## 2022-01-24 DIAGNOSIS — R47.1 DYSARTHRIA: ICD-10-CM

## 2022-01-24 DIAGNOSIS — H02.423 ACQUIRED MYOGENIC PTOSIS OF BOTH EYELIDS: ICD-10-CM

## 2022-01-24 PROCEDURE — 36415 COLL VENOUS BLD VENIPUNCTURE: CPT | Mod: PO | Performed by: PSYCHIATRY & NEUROLOGY

## 2022-02-01 ENCOUNTER — PATIENT OUTREACH (OUTPATIENT)
Dept: ADMINISTRATIVE | Facility: HOSPITAL | Age: 67
End: 2022-02-01
Payer: MEDICARE

## 2022-02-01 NOTE — PROGRESS NOTES
[facility] chart audits-HYPERTENSION.      OV  12.14.21                       B/P 147/93    HTN diagnosis documented within time frame of measurement year.

## 2022-02-06 LAB
DEPRECATED GD1B DISIALYL IGG SER QL: NEGATIVE
DEPRECATED GD1B DISIALYL IGM SER QL: POSITIVE
GM1 ASIALO IGG SER QL: NEGATIVE
GM1 ASIALO IGM SER QL: NEGATIVE
GM1 GANGL IGG SER QL: NEGATIVE
GM1 GANGL IGM SER QL: NEGATIVE

## 2022-02-08 LAB — GD1B GANGL IGM TITR SER: NORMAL TITER

## 2022-02-15 ENCOUNTER — PATIENT MESSAGE (OUTPATIENT)
Dept: NEUROLOGY | Facility: CLINIC | Age: 67
End: 2022-02-15
Payer: MEDICARE

## 2022-02-18 ENCOUNTER — PATIENT MESSAGE (OUTPATIENT)
Dept: NEUROLOGY | Facility: CLINIC | Age: 67
End: 2022-02-18
Payer: MEDICARE

## 2022-02-18 ENCOUNTER — PATIENT OUTREACH (OUTPATIENT)
Dept: ADMINISTRATIVE | Facility: OTHER | Age: 67
End: 2022-02-18
Payer: MEDICARE

## 2022-02-19 NOTE — PROGRESS NOTES
Health Maintenance Due   Topic Date Due    Shingles Vaccine (2 of 3) 04/14/2014     Updates were requested from care everywhere.  Chart was reviewed for overdue Proactive [facility] Encounters (MORE) topics (CRS, Breast Cancer Screening, Eye exam)  Health Maintenance has been updated.  LINKS immunization registry triggered.  Immunizations were reconciled.

## 2022-02-21 ENCOUNTER — OFFICE VISIT (OUTPATIENT)
Dept: OPHTHALMOLOGY | Facility: CLINIC | Age: 67
End: 2022-02-21
Payer: MEDICARE

## 2022-02-21 DIAGNOSIS — Z96.1 PSEUDOPHAKIA OF BOTH EYES: ICD-10-CM

## 2022-02-21 DIAGNOSIS — H40.1231 LOW-TENSION GLAUCOMA OF BOTH EYES, MILD STAGE: Primary | ICD-10-CM

## 2022-02-21 DIAGNOSIS — H02.409: ICD-10-CM

## 2022-02-21 PROCEDURE — 92133 CPTRZD OPH DX IMG PST SGM ON: CPT | Mod: PBBFAC | Performed by: OPHTHALMOLOGY

## 2022-02-21 PROCEDURE — 99214 OFFICE O/P EST MOD 30 MIN: CPT | Mod: S$PBB,,, | Performed by: OPHTHALMOLOGY

## 2022-02-21 PROCEDURE — 99999 PR PBB SHADOW E&M-EST. PATIENT-LVL III: ICD-10-PCS | Mod: PBBFAC,,, | Performed by: OPHTHALMOLOGY

## 2022-02-21 PROCEDURE — 92133 POSTERIOR SEGMENT OCT OPTIC NERVE(OCULAR COHERENCE TOMOGRAPHY) - OU - BOTH EYES: ICD-10-PCS | Mod: 26,S$PBB,, | Performed by: OPHTHALMOLOGY

## 2022-02-21 PROCEDURE — 99213 OFFICE O/P EST LOW 20 MIN: CPT | Mod: PBBFAC | Performed by: OPHTHALMOLOGY

## 2022-02-21 PROCEDURE — 99214 PR OFFICE/OUTPT VISIT, EST, LEVL IV, 30-39 MIN: ICD-10-PCS | Mod: S$PBB,,, | Performed by: OPHTHALMOLOGY

## 2022-02-21 PROCEDURE — 99999 PR PBB SHADOW E&M-EST. PATIENT-LVL III: CPT | Mod: PBBFAC,,, | Performed by: OPHTHALMOLOGY

## 2022-02-21 NOTE — PROGRESS NOTES
SUBJECTIVE  Sophia JACK Hope is 66 y.o. female  Uncorrected distance visual acuity was 20/25 -1 in the right eye and 20/40 +1 in the left eye.   Chief Complaint   Patient presents with    Glaucoma     Pt in today for 4m IOP check and GOCT. Taking Combigan QAM OD and Xalatan QHS OU. Denies any pain or discomfort in eyes.           HPI     Glaucoma      Additional comments: Pt in today for 4m IOP check and GOCT. Taking   Combigan QAM OD and Xalatan QHS OU. Denies any pain or discomfort in eyes.                 Comments     1. Mild LTG No FHx (init 17/17=21/21 adjusted for thin K's)  Goal <14  CCT /  2. PCIOL I-Stent (inject) OS +20.5 SN60WF (distance)12/19/2018  PCIOL / I-Stent OD +20.5 SN60WF (distance) 7-25-18  3. Ptosis    AT's prn OU    Combigan QAM OD  Xalatan QHS OU            Last edited by Delano Preciado MA on 2/21/2022  9:19 AM. (History)         Assessment /Plan :  1. Low-tension glaucoma of both eyes, mild stage Doing well, IOP within acceptable range relative to target IOP and no evidence of progression. Continue current treatment. Reviewed importance of continued compliance with treatment and follow up.      Patient instructed to continue using the following glaucoma medication as follows:  Latanoprost one drop in each eye nightly and Combigan one drop in each eye every 12 hours    Return to clinic in 4 months  or as needed.  With IOP Check       2. Pseudophakia of both eyes  -- Condition stable, no therapeutic change required. Monitoring routinely.     3. Acquired involutional ptosis of eyelid, unspecified laterality - monitor for now

## 2022-02-22 ENCOUNTER — TELEPHONE (OUTPATIENT)
Dept: NEUROLOGY | Facility: CLINIC | Age: 67
End: 2022-02-22
Payer: MEDICARE

## 2022-02-22 ENCOUNTER — OFFICE VISIT (OUTPATIENT)
Dept: NEUROLOGY | Facility: CLINIC | Age: 67
End: 2022-02-22
Payer: MEDICARE

## 2022-02-22 DIAGNOSIS — G12.20 BULBAR MOTOR NEURON DISEASE: Primary | ICD-10-CM

## 2022-02-22 DIAGNOSIS — R47.1 DYSARTHRIA: ICD-10-CM

## 2022-02-22 PROCEDURE — 99215 PR OFFICE/OUTPT VISIT, EST, LEVL V, 40-54 MIN: ICD-10-PCS | Mod: 95,,, | Performed by: PSYCHIATRY & NEUROLOGY

## 2022-02-22 PROCEDURE — 99215 OFFICE O/P EST HI 40 MIN: CPT | Mod: 95,,, | Performed by: PSYCHIATRY & NEUROLOGY

## 2022-02-22 RX ORDER — RILUZOLE 50 MG/1
50 TABLET, FILM COATED ORAL EVERY 12 HOURS
Qty: 60 TABLET | Refills: 11 | Status: SHIPPED | OUTPATIENT
Start: 2022-02-22 | End: 2023-02-22

## 2022-02-22 NOTE — PROGRESS NOTES
NEUROLOGY  Outpatient Tele-Follow Up  [facility]  [address], [place] [address]  [phone] (office) / [phone] (fax)    Patient Name:  Sophia Hope  :  1955  MR #:  [id number]  Acct #:  [id number]    Date of Neurology Visit: 2022  Name of Neurologist: Xi Frank D.O, ABPN, AOBNP, ABEM    Other Physicians:  Tamela Lance MD (Primary Care Physician); No ref. provider found (Referring)      Chief Complaint: Extremity Weakness and Aphasia      History of Present Illness (HPI):  Sophia Hope is a 66 y.o. female seen for follow up of extremity weakness and dysarthria.    Since her last visit she has noticed more trouble walking.  She feels her left leg is buckling and bending backwards more.    She does not feel her swallowing has changed.  She has had some coughing when drinking too fast.      She denies any shortness of breath except sometimes she feels a little short of breath when talking.  Less so when eating.  She can have trouble speaking loudly.  She does not get short of breath on her back.      She feels her cough is sufficient.    She has trouble whistling, she used to be able to.    She has had a couple falls.  The last was recent when she tripped over something in the house.      She thinks she has lost a couple of pounds because her clothes are fitting looser.     She denies any muscle cramps.  She has no muscle twitching.      She does not feel she is any different in the morning compared to the evening.    She feels her speech is better when sitting up straight.  She can't speak as well if she is slumped down.    She saw her eye doctor yesterday.  Her eyes were fine.  There were no new thoughts on the eyelids.  She had an appt to have an eyelid lift, but she is waiting until this workup is complete.  She thinks she has had this somewhat for a while.    Per the records  Eyelid droop onset sometime between  and Oct 2020, seen in  ophthalmology clinic.  She started seeing ENT in Jul 2021 for hoarseness, then notes slurred speech to her PCP in Sept 2021.        Interval Hx:  Last visit 1/4/22  Sophia Hope is a 66 y.o. female referred by her neurologist for further evaluation of dysarthria.    She states her speech changed around May or June of 2021, possibly as far back as April.  She states there was some slowing of her speech and some hoarseness.  She was diagnosed with reflux at the time and started on a stomach medication.  She thought it would improve but it did not.  She does not feel that the speech has changed since then.  She has noticed that her voice has changed.  She has noticed that she sounds like she can't get air or she is short of breath.  She does not feel short of breath.  She states sometimes when she is swallowing, she will have to stop because she feels like there is a lack of air.      She has not noticed a drastic change in her facial appearance, but had noted that in Jan of 2021 she had drooping of the eyelids, worse on the left.     She was seen by the eye doctor.  They had suggested she might need surgery for her eyelids.  She has not had eye surgery for ptosis at this time.    She was seen by the ENT in Dec 2021.  She was advised to go to the hospital for further workup.  They did a CT brain and it was normal.  They advised a swallowing test.      She was then referred to neurology.  At that appointment she noted left leg weakness.  She had no pain.  There was no numbness.  She felt her walking had changed and she was unbalanced.  She got herself a cane around this time due to her balance concerns.      She has not done any physical therapy or speech therapy.  Her swallow study was unremarkable.    She has no family members with symptoms like this.  She is the youngest of her family.  She has no family history of alzheimers, parkinson's, et.    Sometimes she has pain at the left TMJ with motion.  Not necessarily  tender to palpation.    She feels like when she walks she is a little knock-kneed.    Treatment to date:    Mestinon - no benefit    Review of Systems:    See HPI    Past Medical, Surgical, Family & Social History:   Reviewed and updated.    Home Medications:     Current Outpatient Medications:     allopurinoL (ZYLOPRIM) 100 MG tablet, TAKE 1 TABLET(100 MG) BY MOUTH EVERY DAY, Disp: 90 tablet, Rfl: 1    atenoloL (TENORMIN) 100 MG tablet, TAKE 1 TABLET(100 MG) BY MOUTH EVERY DAY, Disp: 90 tablet, Rfl: 1    atenoloL (TENORMIN) 50 MG tablet, TAKE 1 TABLET(50 MG) BY MOUTH EVERY DAY, Disp: 90 tablet, Rfl: 1    baclofen (LIORESAL) 10 MG tablet, Take 1 tablet (10 mg total) by mouth nightly as needed. For back pain, Disp: 30 tablet, Rfl: 2    calcium carbonate-vitamin D2 600 mg calcium- 200 unit Cap, Take 1 tablet by mouth Twice daily., Disp: , Rfl:     COMBIGAN 0.2-0.5 % Drop, INSTILL 1 DROP IN BOTH EYES TWICE DAILY, Disp: 10 mL, Rfl: 12    dextran 70-hypromellose (TEARS) ophthalmic solution, Place 1 drop into both eyes. BOTH EYES PRN , Disp: , Rfl:     hydroCHLOROthiazide (HYDRODIURIL) 25 MG tablet, Take 1 tablet (25 mg total) by mouth once daily., Disp: 30 tablet, Rfl: 11    irbesartan (AVAPRO) 300 MG tablet, TAKE 1 TABLET(300 MG) BY MOUTH EVERY DAY, Disp: 30 tablet, Rfl: 5    latanoprost 0.005 % ophthalmic solution, INSTILL 1 DROP IN BOTH EYES EVERY DAY AT BEDTIME, Disp: 2.5 mL, Rfl: 12    levothyroxine (SYNTHROID) 88 MCG tablet, Take 1 tablet (88 mcg total) by mouth every morning., Disp: 90 tablet, Rfl: 3    meloxicam (MOBIC) 15 MG tablet, TAKE 1 TABLET(15 MG) BY MOUTH DAILY AS NEEDED, Disp: 90 tablet, Rfl: 0    montelukast (SINGULAIR) 10 mg tablet, TAKE 1 TABLET(10 MG) BY MOUTH EVERY DAY, Disp: 90 tablet, Rfl: 1    pantoprazole (PROTONIX) 40 MG tablet, Take 1 tablet (40 mg total) by mouth once daily., Disp: 30 tablet, Rfl: 11    pyridostigmine (MESTINON) 60 mg Tab, Take 0.5 tablets (30 mg total) by  mouth every 6 (six) hours. (Patient not taking: Reported on 2/22/2022), Disp: 60 tablet, Rfl: 3    Physical Examination:  There were no vitals taken for this visit.  Virtual visit.    GENERAL:  General appearance: Well, non-toxic appearing.  No apparent distress.  Extremities: normal.    MENTAL STATUS:  Alertness, attention span & concentration: normal.  Language: normal.  Orientation to self, place & time:  normal.    SPEECH:  Dysarthric and fluent.  Follows complex commands.    CRANIAL NERVES:  Cranial Nerves II-XII were examined.  II - Visual fields: normal.  III, IV, VI: PERRL, EOMI, bilateral ptosis, No nystagmus.  V - Facial sensation: normal.  VII - Face symmetry & mobility: normal.  VIII - Hearing: normal.  IX, X - Palate: mobile & midline.  XI - Shoulder shrug: normal.  XII - Tongue protrusion: normal.      Diagnostic Data Reviewed:   MRI brain 1/3/22:  No acute abnormality  Moderate degree of scattered nonspecific T2/FLAIR hyperintense signal throughout the supratentorial white matter which most likely represents sequela of chronic microvascular ischemic disease.No acute abnormality     EMG 12/17/21:  This is an abnormal EMG of the right upper and lower extremities.  Due to the concerns listed in the history above, additional testing was added to include repetitive nerve stimulation as well as EMG examination of the tongue, paraspinal muscles, and left lower extremity.  The findings are as follows:  1.  Slow repetitive stimulation of the median and facial nerves at rest and following exercise is normal.  There is no evidence, by repetitive stimulations, of a neuromuscular junction defect of the postsynaptic type, as seen with myasthenia gravis.     2. There are focal changes seen in the left lower extremity most consistent with a left L5 radiculopathy, clinical correlation is recommended.   3. The chronic, borderline changes noted in the tongue muscles are consistent with bilateral hypoglossal neuropathy.   This neuropathy can be a symptom of progressive bulbar palsy.  This study does not meet el Escorial criteria for motor neuron disease.  4. The low amplitude noted in the right extensor digitorum brevis muscle appears to be a normal variant of disuse atrophy.  There are no associated neurogenic changes.  There is no evidence of a peripheral neuropathy, plexopathy, or radiculopathy on this study.  In addition, there is no evidence of myopathy or neuromuscular junction disorder on this study.    Swallow study 12/9/21:  Pt presents with a safe functional swallow across tested consistencies.  No aspiration or penetration was observed and her swallow strength and coordination was adequate.     Component      Latest Ref Rng & Units 1/24/2022 1/4/2022   WBC      3.90 - 12.70 K/uL  5.74   RBC      4.00 - 5.40 M/uL  4.08   Hemoglobin      12.0 - 16.0 g/dL  13.2   Hematocrit      37.0 - 48.5 %  40.8   MCV      82 - 98 fL  100 (H)   MCH      27.0 - 31.0 pg  32.4 (H)   MCHC      32.0 - 36.0 g/dL  32.4   RDW      11.5 - 14.5 %  12.6   Platelets      150 - 450 K/uL  198   MPV      9.2 - 12.9 fL  10.8   Immature Granulocytes      0.0 - 0.5 %  0.2   Gran # (ANC)      1.8 - 7.7 K/uL  3.0   Immature Grans (Abs)      0.00 - 0.04 K/uL  0.01   Lymph #      1.0 - 4.8 K/uL  2.1   Mono #      0.3 - 1.0 K/uL  0.5   Eos #      0.0 - 0.5 K/uL  0.1   Baso #      0.00 - 0.20 K/uL  0.05   nRBC      0 /100 WBC  0   Gran %      38.0 - 73.0 %  51.8   Lymph %      18.0 - 48.0 %  36.1   Mono %      4.0 - 15.0 %  9.1   Eosinophil %      0.0 - 8.0 %  1.9   Basophil %      0.0 - 1.9 %  0.9   Differential Method        Automated   Sodium      136 - 145 mmol/L  140   Potassium      3.5 - 5.1 mmol/L  3.6   Chloride      95 - 110 mmol/L  104   CO2      23 - 29 mmol/L  27   Glucose      70 - 110 mg/dL  90   BUN      8 - 23 mg/dL  25 (H)   Creatinine      0.5 - 1.4 mg/dL  1.1   Calcium      8.7 - 10.5 mg/dL  10.0   PROTEIN TOTAL      6.0 - 8.4 g/dL  7.9    Albumin      3.5 - 5.2 g/dL  3.9   BILIRUBIN TOTAL      0.1 - 1.0 mg/dL  0.9   Alkaline Phosphatase      55 - 135 U/L  67   AST      10 - 40 U/L  27   ALT      10 - 44 U/L  24   Anion Gap      8 - 16 mmol/L  9   eGFR if African American      >60 mL/min/1.73 m:2  59.6 (A)   eGFR if non African American      >60 mL/min/1.73 m:2  51.7 (A)   NMO Interpretive Comments        SEE BELOW   PAVAL NESS-1, Serum      <1:240 titer  Negative   PAVAL reflex test added        None.   PAVAL NESS-2, Serum      <1:240 titer  Negative   PAVAL NESS-3, Serum      <1:240 titer  Negative   PAVAL AGNA-1, Serum      <1:240 titer  Negative   PAVAL, PCA-1, Serum      <1:240 titer  Negative   Purkinje Cell Cytoplasmic Ab, Type 2      <1:240 titer  Negative   PAVAL, PCA-Tr, Serum      <1:240 titer  Negative   PAVAL,  Amphiphysin Ab, Serum      <1:240 titer  Negative   CRMP-5 IgG      <1:240 titer  Negative   P/Q Type Calcium Channel Ab      <=0.02 nmol/L  0.00   AChR Ganglionic Neuronal Ab      <=0.02 nmol/L  0.00   Neuronal (V-G) K+ Channel Ab, Serum      <=0.02 nmol/L  0.00   Protein, Serum      6.0 - 8.4 g/dL  7.4   Albumin grams/dl      3.35 - 5.55 g/dL  3.93   Alpha-1 grams/dl      0.17 - 0.41 g/dL  0.36   Alpha-2      0.43 - 0.99 g/dL  0.87   Beta      0.50 - 1.10 g/dL  0.98   Gamma      0.67 - 1.58 g/dL  1.25   IgG Monos, Gm2      Negative Negative Test Not Performed   IgM Monos, Gm2      Negative Negative Test Not Performed   IgG Asialo, Gm2      Negative Negative Test Not Performed   IgM Asialo, Gm2      Negative Negative Test Not Performed   IgG Disialo, Gd1B      Negative Negative Test Not Performed   IgM Disialo, Gd1B      Negative Positive (H) Test Not Performed   Miscellaneous Test Name        See BELOW   Specimen Type        Blood   Test Result        See result image under hyperlink   Reference Lab        SEE COMMENT   Cytoplasmic Neutrophilic Ab      <1:20 Titer  <1:20   Perinuclear (P-ANCA)      <1:20 Titer  <1:20   Vitamin  B-12      210 - 950 pg/mL  293   Folate      4.0 - 24.0 ng/mL  8.3   Immunofix Interp.        SEE COMMENT   Thiamine      38 - 122 ug/L  77   CPK      20 - 180 U/L  365 (H)   Copper      810 - 1990 ug/L  1425   CERULOPLASMIN      15.0 - 45.0 mg/dL  33.0   Pathologist Interpretation VANIA        REVIEWED   Pathologist Interpretation SPE        REVIEWED   IgM Disialo, GD1b Titer      <1:2000 titer <1:2000        Assessment and Plan:  Sophia Hope is a 66 y.o., woman with persistent dysarthria.  She was initially evaluated for myasthenia gravis because the were some ocular concerns as well.  Her myasthenia panel was negative and an EMG revealed no issues with the neuromuscular junction.  Her EMG did show neurogenic changes of the tongue which could be suggestive of a hypoglossal neuropathy however this would be unusual to be present bilaterally.  Superimposed on this she was found to have an elevated CK.    She has a slightly spastic appearing gait. Suggesting upper motor neuron changes.      Labs including testing for OPMD were negative aside from elevated CK.    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  Her son was also on the call so was able to ask additional questions.  After discussion she is agreeable to starting right little 50 mg twice a day to try to slow progression.  We also discussed medications such as Nuedexta, will consider these in the near future.  She will have blood work to monitor this medication.    We reviewed that she would benefit from follow-up in the ALS multidisciplinary Clinic.  She is agreeable to this.  Will make arrangements for an appointment there.      Important to note, also  has a past medical history of Arthritis, Benign colon polyp, Cataract, Diverticulosis, Glaucoma (increased eye pressure), History of abnormal Pap smear, History of herpes zoster, Hypertension, Hypothyroidism,  Osteopenia, and Postmenopausal.    Time Spent: I spent a total of 53 minutes on the day of the visit.This includes face to face time and non-face to face time preparing to see the patient (eg, review of tests), Obtaining and/or reviewing separately obtained history, Documenting clinical information in the electronic or other health record, Independently interpreting resultsand communicating results to the patient/family/caregiver, or Care coordination.        Xi Frank D.O, ABPN, AOBNP, ABEM     This note was created with voice recognition software.  Grammatical, syntax and spelling errors may be inevitable.      The patient location is: home  The chief complaint leading to consultation is: weakness and dysarthria    Visit type: audiovisual    53 minutes of total time spent on the encounter, which includes face to face time and non-face to face time preparing to see the patient (eg, review of tests), Obtaining and/or reviewing separately obtained history, Documenting clinical information in the electronic or other health record, Independently interpreting results (not separately reported) and communicating results to the patient/family/caregiver, or Care coordination (not separately reported).     Each patient to whom he or she provides medical services by telemedicine is:  (1) informed of the relationship between the physician and patient and the respective role of any other health care provider with respect to management of the patient; and (2) notified that he or she may decline to receive medical services by telemedicine and may withdraw from such care at any time.

## 2022-02-28 ENCOUNTER — TELEPHONE (OUTPATIENT)
Dept: ADMINISTRATIVE | Facility: HOSPITAL | Age: 67
End: 2022-02-28
Payer: MEDICARE

## 2022-03-04 ENCOUNTER — PATIENT MESSAGE (OUTPATIENT)
Dept: FAMILY MEDICINE | Facility: CLINIC | Age: 67
End: 2022-03-04
Payer: MEDICARE

## 2022-03-04 DIAGNOSIS — E03.9 HYPOTHYROIDISM, UNSPECIFIED TYPE: ICD-10-CM

## 2022-03-04 NOTE — TELEPHONE ENCOUNTER
No new care gaps identified.  Powered by [employer] by [employer] Reference number: [id number].   3/04/2022 2:05:56 PM CST

## 2022-03-08 ENCOUNTER — PATIENT MESSAGE (OUTPATIENT)
Dept: FAMILY MEDICINE | Facility: CLINIC | Age: 67
End: 2022-03-08
Payer: MEDICARE

## 2022-03-08 ENCOUNTER — TELEPHONE (OUTPATIENT)
Dept: FAMILY MEDICINE | Facility: CLINIC | Age: 67
End: 2022-03-08
Payer: MEDICARE

## 2022-03-08 DIAGNOSIS — E03.9 HYPOTHYROIDISM, UNSPECIFIED TYPE: ICD-10-CM

## 2022-03-08 RX ORDER — LEVOTHYROXINE SODIUM 88 UG/1
88 TABLET ORAL EVERY MORNING
Qty: 90 TABLET | Refills: 3 | Status: SHIPPED | OUTPATIENT
Start: 2022-03-08 | End: 2022-03-11 | Stop reason: SDUPTHER

## 2022-03-08 NOTE — TELEPHONE ENCOUNTER
----- Message from Andrew Ruiz sent at 3/8/2022  8:22 AM CST -----  Contact: Sophia Bhagat is requesting a refill on levothyroxine. Please call her back at [phone].            [id number] - [facility] [address] [address] [address] AT [facility] OF  [address] [facility]  [address] [address] [address]  Phone: [phone] Fax: [phone]          Thanks  DD

## 2022-03-08 NOTE — TELEPHONE ENCOUNTER
I have put the following orders and/or medications to this note.  Please advise pt and assist.    No orders of the defined types were placed in this encounter.      Medications Ordered This Encounter   Medications    levothyroxine (SYNTHROID) 88 MCG tablet     Sig: Take 1 tablet (88 mcg total) by mouth every morning.     Dispense:  90 tablet     Refill:  3

## 2022-03-08 NOTE — TELEPHONE ENCOUNTER
Ok for mobility impairment form. Please complete for #6, temp and put at desk for me to sign. Then advise pt can . Thanks.

## 2022-03-09 ENCOUNTER — TELEPHONE (OUTPATIENT)
Dept: ADMINISTRATIVE | Facility: HOSPITAL | Age: 67
End: 2022-03-09

## 2022-03-09 ENCOUNTER — OFFICE VISIT (OUTPATIENT)
Dept: FAMILY MEDICINE | Facility: CLINIC | Age: 67
End: 2022-03-09
Payer: MEDICARE

## 2022-03-09 VITALS
HEIGHT: 66 IN | DIASTOLIC BLOOD PRESSURE: 78 MMHG | BODY MASS INDEX: 30.04 KG/M2 | TEMPERATURE: 97 F | HEART RATE: 78 BPM | WEIGHT: 186.94 LBS | SYSTOLIC BLOOD PRESSURE: 112 MMHG | OXYGEN SATURATION: 95 %

## 2022-03-09 DIAGNOSIS — E03.9 HYPOTHYROIDISM, UNSPECIFIED TYPE: ICD-10-CM

## 2022-03-09 DIAGNOSIS — E66.9 OBESITY (BMI 30.0-34.9): ICD-10-CM

## 2022-03-09 DIAGNOSIS — K63.5 BENIGN COLON POLYP: ICD-10-CM

## 2022-03-09 DIAGNOSIS — M85.80 OSTEOPENIA, UNSPECIFIED LOCATION: ICD-10-CM

## 2022-03-09 DIAGNOSIS — Z78.0 POSTMENOPAUSAL: ICD-10-CM

## 2022-03-09 DIAGNOSIS — H40.1231 LOW-TENSION GLAUCOMA OF BOTH EYES, MILD STAGE: ICD-10-CM

## 2022-03-09 DIAGNOSIS — Z12.31 OTHER SCREENING MAMMOGRAM: ICD-10-CM

## 2022-03-09 DIAGNOSIS — I10 ESSENTIAL HYPERTENSION: Primary | ICD-10-CM

## 2022-03-09 DIAGNOSIS — G12.20 BULBAR MOTOR NEURON DISEASE: ICD-10-CM

## 2022-03-09 DIAGNOSIS — E79.0 HYPERURICEMIA: ICD-10-CM

## 2022-03-09 PROCEDURE — 99215 OFFICE O/P EST HI 40 MIN: CPT | Mod: PBBFAC,PO | Performed by: FAMILY MEDICINE

## 2022-03-09 PROCEDURE — 99215 PR OFFICE/OUTPT VISIT, EST, LEVL V, 40-54 MIN: ICD-10-PCS | Mod: S$PBB,,, | Performed by: FAMILY MEDICINE

## 2022-03-09 PROCEDURE — 99215 OFFICE O/P EST HI 40 MIN: CPT | Mod: S$PBB,,, | Performed by: FAMILY MEDICINE

## 2022-03-09 PROCEDURE — 99999 PR PBB SHADOW E&M-EST. PATIENT-LVL V: ICD-10-PCS | Mod: PBBFAC,,, | Performed by: FAMILY MEDICINE

## 2022-03-09 PROCEDURE — 99999 PR PBB SHADOW E&M-EST. PATIENT-LVL V: CPT | Mod: PBBFAC,,, | Performed by: FAMILY MEDICINE

## 2022-03-09 NOTE — PROGRESS NOTES
HISTORY OF PRESENT ILLNESS: Ms. Hope comes in today fasting and with taking medication without acute problems for annual wellness examination and for follow up of chronic medical problems.      She is accompanied by her son Jose M Hope. She states she has been living with her daughter since the beginning on 2022. She states her son cooks for her.    END OF LIFE DECISION: She has no living will and does not desire life support.    Current Outpatient Medications   Medication Sig    allopurinoL (ZYLOPRIM) 100 MG tablet TAKE 1 TABLET(100 MG) BY MOUTH EVERY DAY    atenoloL (TENORMIN) 100 MG tablet TAKE 1 TABLET(100 MG) BY MOUTH EVERY DAY    atenoloL (TENORMIN) 50 MG tablet TAKE 1 TABLET(50 MG) BY MOUTH EVERY DAY    baclofen (LIORESAL) 10 MG tablet Take 1 tablet (10 mg total) by mouth nightly as needed. For back pain    calcium carbonate-vitamin D2 600 mg calcium- 200 unit Cap Take 1 tablet by mouth Twice daily.    COMBIGAN 0.2-0.5 % Drop INSTILL 1 DROP IN BOTH EYES TWICE DAILY    dextran 70-hypromellose (TEARS) ophthalmic solution Place 1 drop into both eyes. BOTH EYES PRN     hydroCHLOROthiazide (HYDRODIURIL) 25 MG tablet Take 1 tablet (25 mg total) by mouth once daily.    latanoprost 0.005 % ophthalmic solution INSTILL 1 DROP IN BOTH EYES EVERY DAY AT BEDTIME    meloxicam (MOBIC) 15 MG tablet TAKE 1 TABLET(15 MG) BY MOUTH DAILY AS NEEDED    montelukast (SINGULAIR) 10 mg tablet TAKE 1 TABLET(10 MG) BY MOUTH EVERY DAY    pantoprazole (PROTONIX) 40 MG tablet Take 1 tablet (40 mg total) by mouth once daily.    pyridostigmine (MESTINON) 60 mg Tab Take 0.5 tablets (30 mg total) by mouth every 6 (six) hours.    riluzole 50 mg Tab tablet Take 1 tablet (50 mg total) by mouth every 12 (twelve) hours.    irbesartan (AVAPRO) 300 MG tablet TAKE 1 TABLET(300 MG) BY MOUTH EVERY DAY    levothyroxine (SYNTHROID) 88 MCG tablet Take 1 tablet (88 mcg total) by mouth every morning.     SCREENINGS:   Cholesterol: March 9,  2021.  FFS/Colonoscopy: May 29, 2019 - benign colon polyp, diverticulosis; repeat in 5 years.  Mammogram: December 21, 2021 - okay.   GYN Exam: March 9, 2021 - okay. Pap smears 2014 - 2018 - okay. Pap smear no longer needed.  Dexa Scan: March 9, 2015 - osteopenia; repeat in 5 years.  Eye Exam: February 21, 2022 with Dr. CATRACHO Scott and every 4 months for glaucoma surveillance and scheduled for July 7, 2022.  PPD: Never.  Immunizations: Td/Tdap - March 6, 2017.  Gardasil - N./A.  Zostavax - February 17, 2014.  Shingles - Never. Advised patient insurance-covered benefit at local pharmacy.  Pneumovax - March 9, 2020.  Prevnar-13 shot - March 7, 2019.   Seasonal Flu - September 29, 2021.  Covid-19 (Moderna) vaccine - March 5, 2021, April 5, 2021, October 29, 2021.    ROS:  GENERAL: Denies fever, chills, fatigue or unusual weight change. Appetite normal. Weight 92.3 kg (203 lb 7.8 oz) at September 9, 2021 visit. Monitors diet. Exercises on stationary bike 2 to 3 times per week, 20 to 30 minutes each time.    SKIN: Denies rashes, itching, changes in mole, color or texture of skin or easy bruising.  HEAD: Denies headaches except reports recent head trauma as she states on Saturday, March 5, 2022 as she reached for her dresser and slip and fell onto her face.  She states she was evaluated at [facility] ER and received 3 sutures at her face and was told to have sutures removed in 10 days.  EYES: Denies change in vision, pain, diplopia, redness or discharge. She wears glasses.   EARS: Denies ear pain, discharge, vertigo or decreased hearing.  NOSE: Denies loss of smell, epistaxis or rhinitis.  MOUTH & THROAT: Denies hoarseness or change in voice. Denies excessive gum bleeding or mouth sores. Denies sore throat.  NODES: Denies swollen glands.  CHEST: Denies LUCERO, wheezing, cough, or sputum production.   CARDIOVASCULAR: Denies chest pain, PND, orthopnea or reduced exercise tolerance. Denies palpitations.   ABDOMEN: Denies  "diarrhea, constipation, nausea, vomiting, abdominal pain, or blood in stool.  URINARY: Denies flank pain, dysuria or hematuria.  GENITOURINARY: Denies flank pain, dysuria, frequency or hematuria. She does not perform monthly breast self exams.  ENDOCRINE: Denies diabetes or cholesterol problems.  HEME/LYMPH: Denies bleeding problems.  PERIPHERAL VASCULAR:Denies claudication or cyanosis.  MUSCULOSKELETAL: Denies joint stiffness, pain or swelling. Denies edema.                                    NEUROLOGIC: Denies history of seizures, tremors, paralysis, alteration of gait or coordination. Saw Dr. Xi Frank with neurology on February 22, 2022 for Bulbar                                 motor neuron disease and dysarthria and started on Riluzole 50 mg twice daily with CBC, CMP and follow up in 1 month advised.                                   PSYCHIATRIC: Denies mood swings, depression, anxiety, homicidal or suicidal thoughts. Denies sleep problems.     PE:   /78   Pulse 78   Temp 97.1 °F (36.2 °C)   Ht 5' 6" (1.676 m)   Wt 84.8 kg (186 lb 15.2 oz)   SpO2 95%   BMI 30.17 kg/m²   APPEARANCE: Well nourished, well developed female, pleasant and obese, alert and oriented in no acute distress.   HEAD: Non tender. Full range of motion.  EYES: PERRL, conjunctiva pink, lids no edema.  EARS: External canal patent, no swelling or redness. TM's shiny and clear.  NOSE: Mucosa and turbinates pink, not swollen. No discharge. Non tender sinuses.  THROAT: No pharyngeal erythema or exudate. No stridor.   NECK: Supple, no mass, thyroid not enlarged.  NODES: No cervical, axillary lymph node enlargement.  CHEST: Normal respiratory effort. Lungs clear to auscultation.  CARDIOVASCULAR: Normal S1, S2. No rubs, murmurs or gallops. PMI not displaced. No carotid bruit. Pedal pulses palpable bilaterally. No edema.  ABDOMEN: Bowel sounds present. Not distended. Soft. No tenderness, masses or organomegaly.  BREAST EXAM: " Symmetrical, no external lesions, no discharge, no masses palpated.  PELVIC EXAM: Not examined per patient request today.   RECTAL EXAM: Not examined per patient request today.  MUSCULOSKELETAL: No joint deformities or stiffness. She is ambulatory with assistance of rolling walker.   SKIN: No rashes or suspicious lesions, normal color and turgor.  NEUROLOGIC: Cranial Nerves: II-XII grossly intact. DTR's: Knees, Ankles 2+ and equal bilaterally. Gait & Posture: Normal gait and fine motion. Except slower speech noted.  PSYCHIATRIC: Patient alert, oriented x 3. Mood/Affect normal without acute anxiety or depression noted. Judgment/insight good as she makes appropriate decisions during today's examination.      CMP  Sodium   Date Value Ref Range Status   01/04/2022 140 136 - 145 mmol/L Final     Potassium   Date Value Ref Range Status   01/04/2022 3.6 3.5 - 5.1 mmol/L Final     Chloride   Date Value Ref Range Status   01/04/2022 104 95 - 110 mmol/L Final     CO2   Date Value Ref Range Status   01/04/2022 27 23 - 29 mmol/L Final     Glucose   Date Value Ref Range Status   01/04/2022 90 70 - 110 mg/dL Final     BUN   Date Value Ref Range Status   01/04/2022 25 (H) 8 - 23 mg/dL Final     Creatinine   Date Value Ref Range Status   01/04/2022 1.1 0.5 - 1.4 mg/dL Final     Calcium   Date Value Ref Range Status   01/04/2022 10.0 8.7 - 10.5 mg/dL Final     Total Protein   Date Value Ref Range Status   01/04/2022 7.9 6.0 - 8.4 g/dL Final     Albumin   Date Value Ref Range Status   01/04/2022 3.9 3.5 - 5.2 g/dL Final     Total Bilirubin   Date Value Ref Range Status   01/04/2022 0.9 0.1 - 1.0 mg/dL Final     Comment:     For infants and newborns, interpretation of results should be based  on gestational age, weight and in agreement with clinical  observations.    Premature Infant recommended reference ranges:  Up to 24 hours.............<8.0 mg/dL  Up to 48 hours............<12.0 mg/dL  3-5 days..................<15.0 mg/dL  6-29  days.................<15.0 mg/dL       Alkaline Phosphatase   Date Value Ref Range Status   01/04/2022 67 55 - 135 U/L Final     AST   Date Value Ref Range Status   01/04/2022 27 10 - 40 U/L Final     ALT   Date Value Ref Range Status   01/04/2022 24 10 - 44 U/L Final     Anion Gap   Date Value Ref Range Status   01/04/2022 9 8 - 16 mmol/L Final     eGFR if    Date Value Ref Range Status   01/04/2022 59.6 (A) >60 mL/min/1.73 m^2 Final     eGFR if non    Date Value Ref Range Status   01/04/2022 51.7 (A) >60 mL/min/1.73 m^2 Final     Comment:     Calculation used to obtain the estimated glomerular filtration  rate (eGFR) is the CKD-EPI equation.        Lab Results   Component Value Date    WBC 5.74 01/04/2022    HGB 13.2 01/04/2022    HCT 40.8 01/04/2022     (H) 01/04/2022     01/04/2022     Protein, UA                12/02/2021 Negative       ASSESSMENT:    ICD-10-CM ICD-9-CM    1. Essential hypertension  I10 401.9 Lipid Panel   2. Hypothyroidism, unspecified type  E03.9 244.9 TSH   3. Hyperuricemia  E79.0 790.6 Uric Acid   4. Osteopenia, unspecified location  M85.80 733.90    5. Low-tension glaucoma of both eyes, mild stage  H40.1231 365.12      365.71    6. Bulbar motor neuron disease  G12.20 335.20    7. Benign colon polyp  K63.5 211.3    8. Obesity (BMI 30.0-34.9)  E66.9 278.00    9. Postmenopausal  Z78.0 V49.81    10. Other screening mammogram  Z12.31 V76.12 Mammo Digital Screening Bilat w/ James     PLAN:  1. Age-appropriate counseling-appropriate low-sodium, low-cholesterol, low carbohydrate diet and exercise daily, monthly breast self exam, annual wellness examination.   2. Patient advised to call for results.  3. Continue current medications.  4. Keep follow up with specialists.  5. Follow up in about 6 months (around 9/9/2022) for hypertension follow up. But see me on 3/15/2022 for suture removal.  6. [facility] [facility] Screening form to be completed once labs  reviewed.    40 minutes of total time spent on the encounter, which includes face to face time and non-face to face time preparing to see the patient (eg, review of tests), Obtaining and/or reviewing separately obtained history, Documenting clinical information in the electronic or other health record, Independently interpreting results (not separately reported) and communicating results to the patient/family/caregiver, or Care coordination (not separately reported).

## 2022-03-10 ENCOUNTER — PATIENT MESSAGE (OUTPATIENT)
Dept: NEUROLOGY | Facility: CLINIC | Age: 67
End: 2022-03-10
Payer: MEDICARE

## 2022-03-11 RX ORDER — IRBESARTAN 300 MG/1
TABLET ORAL
Qty: 90 TABLET | Refills: 3 | Status: SHIPPED | OUTPATIENT
Start: 2022-03-11

## 2022-03-11 RX ORDER — LEVOTHYROXINE SODIUM 88 UG/1
88 TABLET ORAL EVERY MORNING
Qty: 90 TABLET | Refills: 3 | Status: SHIPPED | OUTPATIENT
Start: 2022-03-11

## 2022-03-11 NOTE — TELEPHONE ENCOUNTER
No new care gaps identified.  Powered by [employer]. Reference number: [id number].   3/11/2022 8:08:38 AM CST

## 2022-03-11 NOTE — TELEPHONE ENCOUNTER
Refill Authorization Note   Sophia Hope  is requesting a refill authorization.  Brief Assessment and Rationale for Refill:  Approve     Medication Therapy Plan:  Previous orders from 3/8/2022 did not go to the pharmacy the patient indicated    Medication Reconciliation Completed: No   Comments:   --->Care Gap information included below if applicable.   Orders Placed This Encounter    levothyroxine (SYNTHROID) 88 MCG tablet      Requested Prescriptions   Signed Prescriptions Disp Refills    levothyroxine (SYNTHROID) 88 MCG tablet 90 tablet 3     Sig: Take 1 tablet (88 mcg total) by mouth every morning.       Endocrinology:  Hypothyroid Agents Passed - 3/4/2022  2:05 PM        Passed - Patient is at least 18 years old        Passed - Valid encounter within last 15 months     Recent Visits  Date Type Provider Dept   03/09/22 Office Visit Tamela Lance MD [facility]   09/09/21 Office Visit Tamela Lance MD [facility]   03/09/21 Office Visit Tamela Lance MD [facility]   09/09/20 Office Visit aTmela Lance MD [facility]   Showing recent visits within past 720 days and meeting all other requirements  Future Appointments  No visits were found meeting these conditions.  Showing future appointments within next 150 days and meeting all other requirements      Future Appointments              [facility]    In 1 week LABORATORY, [facility]    In 1 week LABORATORY, [facility]    In 3 months Jose Scott MD [facility] - Ophthalmology 3rd Fl, [facility]    In 6 months Tamela Lance MD [facility] Medicine, [address]    In 9 months [facility] [place] MAMMO1-SCR [facility] - Mammography 1st Fl, [facility]                Passed - Manual Review: FT4 is not required if last TSH is WNL.        Passed - TSH in normal  range and within 360 days     TSH   Date Value Ref Range Status   12/02/2021 3.170 0.400 - 4.000 uIU/mL Final   03/09/2021 2.837 0.400 - 4.000 uIU/mL Final   03/09/2020 1.797 0.400 - 4.000 uIU/mL Final              Passed - T4 free within 1080 days     Free T4   Date Value Ref Range Status   09/09/2019 1.47 0.71 - 1.51 ng/dL Final   03/07/2019 1.35 0.71 - 1.51 ng/dL Final   03/26/2012 1.30 0.71 - 1.51 ng/dl Final                  Appointments  past 12m or future 3m with PCP    Date Provider   Last Visit   9/9/2021 Tamela Lance MD   Next Visit   3/8/2022 Tamela Lance MD   ED visits in past 90 days: 0     Note composed:4:15 PM 03/11/2022

## 2022-03-11 NOTE — TELEPHONE ENCOUNTER
Refill Authorization Note   Sophia Hope  is requesting a refill authorization.  Brief Assessment and Rationale for Refill:  Approve     Medication Therapy Plan:       Medication Reconciliation Completed: No   Comments:   --->Care Gap information included below if applicable.       Requested Prescriptions   Pending Prescriptions Disp Refills    irbesartan (AVAPRO) 300 MG tablet [Pharmacy Med Name: IRBESARTAN 300MG TABLETS] 90 tablet 3     Sig: TAKE 1 TABLET(300 MG) BY MOUTH EVERY DAY       Cardiovascular:  Angiotensin Receptor Blockers Passed - 3/11/2022  8:08 AM        Passed - Patient is at least 18 years old        Passed - Last BP in normal range within 360 days     BP Readings from Last 1 Encounters:   03/09/22 112/78               Passed - Valid encounter within last 15 months     Recent Visits  Date Type Provider Dept   03/09/22 Office Visit Tamela Lance MD [facility]   09/09/21 Office Visit Tamela Lance MD [facility]   03/09/21 Office Visit Tamela Lance MD [facility]   09/09/20 Office Visit Tamela Lance MD [facility]   Showing recent visits within past 720 days and meeting all other requirements  Future Appointments  No visits were found meeting these conditions.  Showing future appointments within next 150 days and meeting all other requirements      Future Appointments              In 4 days Tamela Lance MD [facility], [address]    In 1 week LABORATORY, [facility] - [facility]    In 1 week LABORATORY, [facility]    In 3 months Jose Scott MD [facility] - Ophthalmology 3rd Fl, [facility]    In 6 months [facility] MD [facility]    In 9 months [facility] MAMMO1-SCR [facility] [place] - Mammography 1st Fl, [facility]                Passed - Cr is 1.39 or below and within 360 days     Lab Results   Component  Value Date    CREATININE 1.1 01/04/2022    CREATININE 1.1 12/02/2021    CREATININE 1.0 03/09/2021              Passed - K is 5.2 or below and within 360 days     Potassium   Date Value Ref Range Status   01/04/2022 3.6 3.5 - 5.1 mmol/L Final   12/02/2021 3.9 3.5 - 5.1 mmol/L Final   03/09/2021 3.5 3.5 - 5.1 mmol/L Final              Passed - eGFR within 360 days     Lab Results   Component Value Date    EGFRNONAA 51.7 (A) 01/04/2022    EGFRNONAA 52 (A) 12/02/2021    EGFRNONAA 58.4 (A) 03/09/2021                    Appointments  past 12m or future 3m with PCP    Date Provider   Last Visit   3/9/2022 Tamela Lance MD   Next Visit   3/15/2022 Tamela Lance MD   ED visits in past 90 days: 0     Note composed:8:32 AM 03/11/2022

## 2022-03-15 ENCOUNTER — OFFICE VISIT (OUTPATIENT)
Dept: FAMILY MEDICINE | Facility: CLINIC | Age: 67
End: 2022-03-15
Payer: MEDICARE

## 2022-03-15 VITALS
BODY MASS INDEX: 30.25 KG/M2 | HEART RATE: 73 BPM | OXYGEN SATURATION: 99 % | WEIGHT: 188.25 LBS | TEMPERATURE: 98 F | HEIGHT: 66 IN | DIASTOLIC BLOOD PRESSURE: 72 MMHG | SYSTOLIC BLOOD PRESSURE: 114 MMHG

## 2022-03-15 DIAGNOSIS — S01.81XS FACIAL LACERATION, SEQUELA: Primary | ICD-10-CM

## 2022-03-15 PROCEDURE — 99213 PR OFFICE/OUTPT VISIT, EST, LEVL III, 20-29 MIN: ICD-10-PCS | Mod: S$PBB,,, | Performed by: FAMILY MEDICINE

## 2022-03-15 PROCEDURE — 99999 PR PBB SHADOW E&M-EST. PATIENT-LVL V: CPT | Mod: PBBFAC,,, | Performed by: FAMILY MEDICINE

## 2022-03-15 PROCEDURE — 99999 PR PBB SHADOW E&M-EST. PATIENT-LVL V: ICD-10-PCS | Mod: PBBFAC,,, | Performed by: FAMILY MEDICINE

## 2022-03-15 PROCEDURE — 99213 OFFICE O/P EST LOW 20 MIN: CPT | Mod: S$PBB,,, | Performed by: FAMILY MEDICINE

## 2022-03-15 PROCEDURE — 99215 OFFICE O/P EST HI 40 MIN: CPT | Mod: PBBFAC,PO | Performed by: FAMILY MEDICINE

## 2022-03-15 NOTE — PROGRESS NOTES
Sophia Hope    Chief Complaint   Patient presents with    Suture / Staple Removal       History of Present Illness:   Sophia Hope is a 66 y.o. female who presents today for suture removal. She is accomapanied by her son Jose M Hope.    She states she slipped and fell onto her face as she reached for her dresser on Saturday, March 5, 2022.  She states she was evaluated at [facility] ER and received 3 sutures at her face and was told to have sutures removed in 10 days.  She states she was also prescribed Bactroban ointment and LET gel to put on the wound. She reports she feels fine today.     Otherwise, she denies having fever, chills, fatigue, appetite changes; shortness of breath, cough, wheezing; chest pain, palpitations,  leg swelling; abdominal pain, nausea, vomiting, diarrhea, constipation; unusual urinary symptoms; back pain; headache, face pain, numbness; anxiety, depression, homicidal or suicidal thoughts.             Current Outpatient Medications   Medication Sig    allopurinoL (ZYLOPRIM) 100 MG tablet TAKE 1 TABLET(100 MG) BY MOUTH EVERY DAY    atenoloL (TENORMIN) 100 MG tablet TAKE 1 TABLET(100 MG) BY MOUTH EVERY DAY    atenoloL (TENORMIN) 50 MG tablet TAKE 1 TABLET(50 MG) BY MOUTH EVERY DAY    baclofen (LIORESAL) 10 MG tablet Take 1 tablet (10 mg total) by mouth nightly as needed. For back pain    calcium carbonate-vitamin D2 600 mg calcium- 200 unit Cap Take 1 tablet by mouth Twice daily.    COMBIGAN 0.2-0.5 % Drop INSTILL 1 DROP IN BOTH EYES TWICE DAILY    dextran 70-hypromellose (TEARS) ophthalmic solution Place 1 drop into both eyes. BOTH EYES PRN     hydroCHLOROthiazide (HYDRODIURIL) 25 MG tablet Take 1 tablet (25 mg total) by mouth once daily.    irbesartan (AVAPRO) 300 MG tablet TAKE 1 TABLET(300 MG) BY MOUTH EVERY DAY    latanoprost 0.005 % ophthalmic solution INSTILL 1 DROP IN BOTH EYES EVERY DAY AT BEDTIME    levothyroxine (SYNTHROID) 88 MCG tablet Take 1 tablet (88  mcg total) by mouth every morning.    meloxicam (MOBIC) 15 MG tablet TAKE 1 TABLET(15 MG) BY MOUTH DAILY AS NEEDED    montelukast (SINGULAIR) 10 mg tablet TAKE 1 TABLET(10 MG) BY MOUTH EVERY DAY    pantoprazole (PROTONIX) 40 MG tablet Take 1 tablet (40 mg total) by mouth once daily.    pyridostigmine (MESTINON) 60 mg Tab Take 0.5 tablets (30 mg total) by mouth every 6 (six) hours.    riluzole 50 mg Tab tablet Take 1 tablet (50 mg total) by mouth every 12 (twelve) hours.     Review of Systems   Constitutional: Negative for activity change, appetite change, chills, fatigue and fever.   HENT:        See history of present illness.   Respiratory: Negative for cough, shortness of breath and wheezing.    Cardiovascular: Negative for chest pain, palpitations and leg swelling.   Gastrointestinal: Negative for abdominal pain, constipation, diarrhea, nausea and vomiting.   Genitourinary: Negative for difficulty urinating.   Musculoskeletal: Negative for back pain.   Skin: Positive for wound.        See history of present illness.   Neurological: Negative for numbness and headaches.        See history of present illness.   Psychiatric/Behavioral: Negative for dysphoric mood and suicidal ideas. The patient is not nervous/anxious.         Negative for homicidal ideas.       Objective:  Physical Exam  Vitals reviewed.   Constitutional:       General: She is not in acute distress.     Appearance: Normal appearance. She is well-developed. She is obese. She is not ill-appearing, toxic-appearing or diaphoretic.   Neck:      Thyroid: No thyromegaly.   Cardiovascular:      Rate and Rhythm: Normal rate and regular rhythm.      Pulses: Normal pulses.      Heart sounds: Normal heart sounds. No murmur heard.  Pulmonary:      Effort: Pulmonary effort is normal. No respiratory distress.      Breath sounds: Normal breath sounds. No wheezing.   Abdominal:      General: Bowel sounds are normal. There is no distension.      Palpations:  Abdomen is soft. There is no mass.      Tenderness: There is no abdominal tenderness. There is no guarding or rebound.   Musculoskeletal:         General: No swelling or tenderness. Normal range of motion.      Cervical back: No muscular tenderness.      Comments: She is ambulatory with assistance of rolling walker.    Skin:     Comments: 3 intact sutures across nasal bridge with good laceration healing noted.    PROCEDURE:  3 intact sutures removed without problems.  Good wound healing noted.   Neurological:      General: No focal deficit present.      Mental Status: She is alert and oriented to person, place, and time.   Psychiatric:         Mood and Affect: Mood normal.         Behavior: Behavior normal.         Thought Content: Thought content normal.         Judgment: Judgment normal.         ASSESSMENT:  1. Facial laceration, sequela        PLAN:  Sophia was seen today for suture / staple removal.    Diagnoses and all orders for this visit:    Facial laceration, sequela       Suture removal as noted above.  Wound care discussed with patient.  Continue current medications, follow low sodium, low cholesterol, low carb diet, daily walks.  Follow up if symptoms worsen or fail to improve.    Scribe Attestation:   I, Milo Hensley, am scribing for, and in the presence of, Dr. Lance. I performed the above scribed service and the documentation accurately describes the services I performed. I attest to the accuracy of the note.    I, Dr. Lance, reviewed documentation as scribed above. I personally performed the services described in this documentation.  I agree that the record reflects my personal performance and is accurate and complete. . 03/15/2022

## 2022-03-17 DIAGNOSIS — R00.2 PALPITATIONS: ICD-10-CM

## 2022-03-17 DIAGNOSIS — I10 ESSENTIAL HYPERTENSION: Chronic | ICD-10-CM

## 2022-03-17 RX ORDER — ATENOLOL 100 MG/1
100 TABLET ORAL DAILY
Qty: 90 TABLET | Refills: 1 | Status: SHIPPED | OUTPATIENT
Start: 2022-03-17 | End: 2022-06-18

## 2022-03-17 NOTE — TELEPHONE ENCOUNTER
No new care gaps identified.  Powered by [employer]. Reference number: [id number].   3/17/2022 9:47:28 AM CDT

## 2022-03-17 NOTE — TELEPHONE ENCOUNTER
----- Message from Tamela Bush sent at 3/17/2022  9:42 AM CDT -----  Regarding: refill  Contact: patient  Type:  RX Refill Request    Who Called: patient  Refill or New Rx:refill  RX Name and Strength:Atenolol- 50mg  How is the patient currently taking it? (ex. 1XDay):once daily  Is this a 30 day or 90 day RX:90  Preferred Pharmacy with phone number:[facility]  Local or Mail Order:local  Ordering Provider:Dr Lance  Would the patient rather a call back or a response via [facility]? call  Best Call Back Number:[phone]   Additional Information:

## 2022-03-18 ENCOUNTER — PATIENT MESSAGE (OUTPATIENT)
Dept: FAMILY MEDICINE | Facility: CLINIC | Age: 67
End: 2022-03-18
Payer: MEDICARE

## 2022-03-18 DIAGNOSIS — R00.2 PALPITATIONS: ICD-10-CM

## 2022-03-18 DIAGNOSIS — I10 ESSENTIAL HYPERTENSION: Chronic | ICD-10-CM

## 2022-03-18 RX ORDER — ATENOLOL 50 MG/1
50 TABLET ORAL DAILY
Qty: 90 TABLET | Refills: 1 | Status: SHIPPED | OUTPATIENT
Start: 2022-03-18 | End: 2022-06-18

## 2022-03-18 NOTE — TELEPHONE ENCOUNTER
No new care gaps identified.  Powered by [employer]. Reference number: [id number].   3/18/2022 9:45:31 AM CDT

## 2022-03-18 NOTE — TELEPHONE ENCOUNTER
[facility]-3/15/22  [facility]-2018  Upcoming appt-9/19/22    Pt requesting- atenolol 50mg     Last fill-12/20/21

## 2022-03-21 ENCOUNTER — TELEPHONE (OUTPATIENT)
Dept: ADMINISTRATIVE | Facility: HOSPITAL | Age: 67
End: 2022-03-21
Payer: MEDICARE

## 2022-03-24 ENCOUNTER — TELEPHONE (OUTPATIENT)
Dept: NEUROLOGY | Facility: CLINIC | Age: 67
End: 2022-03-24
Payer: MEDICARE

## 2022-03-24 ENCOUNTER — LAB VISIT (OUTPATIENT)
Dept: LAB | Facility: HOSPITAL | Age: 67
End: 2022-03-24
Attending: FAMILY MEDICINE
Payer: MEDICARE

## 2022-03-24 DIAGNOSIS — E03.9 HYPOTHYROIDISM, UNSPECIFIED TYPE: ICD-10-CM

## 2022-03-24 DIAGNOSIS — I10 ESSENTIAL HYPERTENSION: ICD-10-CM

## 2022-03-24 DIAGNOSIS — E79.0 HYPERURICEMIA: ICD-10-CM

## 2022-03-24 LAB
CHOLEST SERPL-MCNC: 190 MG/DL (ref 120–199)
CHOLEST/HDLC SERPL: 5.1 {RATIO} (ref 2–5)
HDLC SERPL-MCNC: 37 MG/DL (ref 40–75)
HDLC SERPL: 19.5 % (ref 20–50)
LDLC SERPL CALC-MCNC: 128.8 MG/DL (ref 63–159)
NONHDLC SERPL-MCNC: 153 MG/DL
TRIGL SERPL-MCNC: 121 MG/DL (ref 30–150)
TSH SERPL DL<=0.005 MIU/L-ACNC: 2.46 UIU/ML (ref 0.4–4)
URATE SERPL-MCNC: 4.9 MG/DL (ref 2.4–5.7)

## 2022-03-24 PROCEDURE — 80061 LIPID PANEL: CPT | Performed by: FAMILY MEDICINE

## 2022-03-24 PROCEDURE — 84443 ASSAY THYROID STIM HORMONE: CPT | Performed by: FAMILY MEDICINE

## 2022-03-24 PROCEDURE — 84550 ASSAY OF BLOOD/URIC ACID: CPT | Performed by: FAMILY MEDICINE

## 2022-03-24 NOTE — TELEPHONE ENCOUNTER
Spoke with Mrs. Hope regarding [facility] appt. She accepted the 4/13 appt. She is not certain if her son or niece will accompany her to the clinic appt.     She denied experiencing difficulty swallowing; however, really tired.       Mrs. Cortes declined [facility] Reservations.         Jerrell Hernandes RN, BSN, BS  ALS Clinical Care Coordinator  [phone]

## 2022-03-28 ENCOUNTER — TELEPHONE (OUTPATIENT)
Dept: FAMILY MEDICINE | Facility: CLINIC | Age: 67
End: 2022-03-28
Payer: MEDICARE

## 2022-03-28 ENCOUNTER — TELEPHONE (OUTPATIENT)
Dept: NEUROLOGY | Facility: CLINIC | Age: 67
End: 2022-03-28
Payer: MEDICARE

## 2022-03-28 NOTE — TELEPHONE ENCOUNTER
Please see desk for completed [employer] form to submit accordingly. Let patient know and get original to her and keep copy for chart. Thanks.

## 2022-03-28 NOTE — TELEPHONE ENCOUNTER
Incoming call from Mrs. Cortes regarding [facility] Reservations. She accepted hotel accommodations for 1 room double beds. Check in on 4/12 and check out on 4/13. [facility] Reservations completed.

## 2022-04-01 ENCOUNTER — PATIENT MESSAGE (OUTPATIENT)
Dept: NEUROLOGY | Facility: CLINIC | Age: 67
End: 2022-04-01
Payer: MEDICARE

## 2022-04-01 ENCOUNTER — TELEPHONE (OUTPATIENT)
Dept: NEUROLOGY | Facility: CLINIC | Age: 67
End: 2022-04-01
Payer: MEDICARE

## 2022-04-01 NOTE — TELEPHONE ENCOUNTER
----- Message from Xi Frank DO sent at 4/1/2022 10:15 AM CDT -----  Chronic GFR changes, but mild. Otherwise looks ok.  The patient was notified by portal.

## 2022-04-05 DIAGNOSIS — Z71.89 COMPLEX CARE COORDINATION: ICD-10-CM

## 2022-04-12 NOTE — PROGRESS NOTES
Patient and Family Members Demographics:  Patient is a 66 year old female present for today's ALS clinic.    Patient accompanied by their daughter Keila, daughter Elena, and  Nancy Munoz.   Patient resides in [place], [place]     (CONTACT INFO)    [email or web address]  Keila's phone number [phone]    SUPPORTS:   Lives with daughter and daughter's family.     provided handout with following information on ALS family supports.     Family Caregiver  Primarily, caregiving is provided by family members. Family caregivers provide care day and night, over weekends and on demand. Caregiving can include personal care, assistance with mobility in the home, transportation, housework, and grocery shopping, along with looking after other family members needs. Caregivers are often employed outside the home and may be the primary source of household income which adds even more demands, responsibilities and stress. The family caregiver spouse, partner, adult child, parent, brother, sister -- needs acknowledgement and support in the process of starting and maintaining the care-providing relationship.    Community bitmovin Waiver (CCW)- information attached  There is a program under the [place] Long Term Medicaid Benefit called Community Choices Waiver (CCW). People with ALS will generally qualify medically, but you also have to qualify financially. They will use ( Medicaid ) criteria to be eligible for this program. This program has some benefits as listed on the handout and would allow for a person to sit with person with ALS. The sitter will be limited to what they are allowed to do and they usually dont handle medications, machines, or administer tube feeding. They would be able to assist with dressing, bathing, transfers, and other small tasks.    Home Health These are specialized healthcare-related services that are provided by medical professionals in the comfort of your own home. Home  health will be ordered by your doctor as needs arise. These services cover a wide range of medical, social and therapeutic treatments, as well as activities associated with daily living. These are time limited and will generally last a few hours a day for a few weeks. This is not intended to be 24-hour daily care. This is covered by private insurance, Medicare, and Medicaid.    Private sitters  This would be an out-of-pocket expense to the patient and family. This is not covered by private insurance or Medicare. The cost typically starts around $20-$25 per hour. The general process would be that you can reach out to a perspective agency and let them know what your needs are. They will do a more needs-based evaluation and make the determination. These agencies may provide a Sitter, Certified Nursing Assistant, Licensed Practical Nurse or Registered Nurse.    [employer] or LISA   they are not yet linked to team [place].    Patient and family spoke to DEACONA representative at [facility].    INSURANCE COVERAGE:   Patient confirmed the following insurance coverage:   Medicare A and B and BCBS - part of a state plan.   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       [place] benefits:   Patient confirmed they are not a .     Waiver Services:   [place]: MICHELLE provided handout out Community Furious waiver for [place]   What is the Community SellAnyCar.ru Waiver Program?  The Community SellAny[employer] Waiver program provides services in the home and in the community to elders or adults with disabilities who qualify.  [place] [facility] Long Term Care at [phone]    Additional Concerns:   Patient's daughter rightfully so, brings up the concern that this patient does not have a formal diagnosis of ALS.   MICHELLE and LISA apologized for any discomfort they may have caused and reported they will continue to support as needed.     SW TO FOLLOW UP:    will discuss with clinic coordinator and medical  team in regards to the family's concerns regarding diagnosis.     Benedict Casey, RONNIEW

## 2022-04-13 ENCOUNTER — OFFICE VISIT (OUTPATIENT)
Dept: NEUROLOGY | Facility: CLINIC | Age: 67
End: 2022-04-13
Payer: MEDICARE

## 2022-04-13 VITALS — WEIGHT: 183.44 LBS | BODY MASS INDEX: 29.48 KG/M2 | HEIGHT: 66 IN | OXYGEN SATURATION: 98 %

## 2022-04-13 DIAGNOSIS — M19.012 PRIMARY OSTEOARTHRITIS OF LEFT SHOULDER: ICD-10-CM

## 2022-04-13 DIAGNOSIS — R47.1 DYSARTHRIA: ICD-10-CM

## 2022-04-13 DIAGNOSIS — G12.20 BULBAR MOTOR NEURON DISEASE: Primary | ICD-10-CM

## 2022-04-13 DIAGNOSIS — Z00.8 NUTRITIONAL ASSESSMENT: ICD-10-CM

## 2022-04-13 DIAGNOSIS — M54.50 CHRONIC MIDLINE LOW BACK PAIN WITHOUT SCIATICA: ICD-10-CM

## 2022-04-13 DIAGNOSIS — G89.29 CHRONIC MIDLINE LOW BACK PAIN WITHOUT SCIATICA: ICD-10-CM

## 2022-04-13 DIAGNOSIS — R26.9 ABNORMAL GAIT: ICD-10-CM

## 2022-04-13 DIAGNOSIS — M17.0 PRIMARY OSTEOARTHRITIS OF BOTH KNEES: ICD-10-CM

## 2022-04-13 DIAGNOSIS — M47.816 SPONDYLOSIS OF LUMBAR REGION WITHOUT MYELOPATHY OR RADICULOPATHY: ICD-10-CM

## 2022-04-13 DIAGNOSIS — R13.12 OROPHARYNGEAL DYSPHAGIA: ICD-10-CM

## 2022-04-13 DIAGNOSIS — R25.2 SPASTICITY: ICD-10-CM

## 2022-04-13 DIAGNOSIS — R05.8 WEAK COUGH: ICD-10-CM

## 2022-04-13 PROCEDURE — 99999 PR PBB SHADOW E&M-EST. PATIENT-LVL III: ICD-10-PCS | Mod: PBBFAC,,,

## 2022-04-13 PROCEDURE — 99203 PR OFFICE/OUTPT VISIT, NEW, LEVL III, 30-44 MIN: ICD-10-PCS | Mod: S$PBB,,, | Performed by: PHYSICAL MEDICINE & REHABILITATION

## 2022-04-13 PROCEDURE — 92522 EVALUATE SPEECH PRODUCTION: CPT | Mod: PO

## 2022-04-13 PROCEDURE — 97163 PT EVAL HIGH COMPLEX 45 MIN: CPT | Mod: PN

## 2022-04-13 PROCEDURE — 99213 OFFICE O/P EST LOW 20 MIN: CPT | Mod: PBBFAC

## 2022-04-13 PROCEDURE — 99215 PR OFFICE/OUTPT VISIT, EST, LEVL V, 40-54 MIN: ICD-10-PCS | Mod: S$PBB,,, | Performed by: PSYCHIATRY & NEUROLOGY

## 2022-04-13 PROCEDURE — 99999 PR PBB SHADOW E&M-EST. PATIENT-LVL III: CPT | Mod: PBBFAC,,,

## 2022-04-13 PROCEDURE — 99203 PR OFFICE/OUTPT VISIT, NEW, LEVL III, 30-44 MIN: ICD-10-PCS | Mod: S$PBB,,, | Performed by: INTERNAL MEDICINE

## 2022-04-13 PROCEDURE — 97165 OT EVAL LOW COMPLEX 30 MIN: CPT | Mod: PO

## 2022-04-13 PROCEDURE — 99203 OFFICE O/P NEW LOW 30 MIN: CPT | Mod: S$PBB,,, | Performed by: INTERNAL MEDICINE

## 2022-04-13 PROCEDURE — 92610 EVALUATE SWALLOWING FUNCTION: CPT | Mod: PO

## 2022-04-13 PROCEDURE — 99203 OFFICE O/P NEW LOW 30 MIN: CPT | Mod: S$PBB,,, | Performed by: PHYSICAL MEDICINE & REHABILITATION

## 2022-04-13 PROCEDURE — 99215 OFFICE O/P EST HI 40 MIN: CPT | Mod: S$PBB,,, | Performed by: PSYCHIATRY & NEUROLOGY

## 2022-04-13 NOTE — PROGRESS NOTES
NEUROLOGY  Neuromuscular and ALS Clinic  [facility]  [address] [place] [address]  [phone] (office) / [phone] (fax)    Patient Name:  Sophia Hope  :  1955  MR #:  [id number]  Acct #:  [id number]    Date of Clinic Consult: 2022  Name of Neurologist: Xi Frank D.O, ABPN, AOBNP, ABEM    Other Physicians:  Tamela Lance MD (Primary Care Physician)    Chief Complaint: Nutrition Counseling (Initial ALS clinic)      History of Present Illness (HPI):  Sophia Hope is a 66 y.o. female referred here today to establish care for motor neuron disease. She was diagnosed by Dr. Frank bulbar onset ALS following development of extremity weakness and dysarthria. Her symptoms began with changes in speech beginning around May/2021.     Here with:  Petty (daughter), EUGENIE (son in law), and Elena (granddaughter) whom she lives with in [facility]    Strength:  Decreased strength since she saw Dr. Frank, states she is completely reliant on wheelchair now    Dysarthria:  Some difficulty with swallowing liquids, choking reported on liquids but able to eat normal diet    Dysphagia: Slow speech with slurring of some words    Sialorrhea:  Moderate sialorrhea but not drooling, just pooling of saliva in her mouth    Constipation:  Occasionally constipation but no complaints    Sleep:  Sleeps through the night     Gait:  Walks with rollator    Falls:  2 falls in the last 3 months both of them she was walking on the carpet with socks and slipped     Breathing:  Occasional SOB reported due to choking while drinking liquids    Bladder:  Reports some urgency but denies any accidents    Pain:  Denies    Mood:  Good    PBA:  Denies     Memory:  Denies      Treatment to date:    Riluzole 50mg BID  Radicava- Discussion with family in clinic today, they are deciding     Review of Systems:   As above    ALSFRS:  36     FVC: 68%    Past Medical, Surgical, Family & Social  History:   Past Medical History:   Diagnosis Date    Arthritis     Benign colon polyp     Cataract     Diverticulosis     Glaucoma (increased eye pressure)     History of abnormal Pap smear     History of herpes zoster     Right flank 4/30/12    Hypertension     Hypothyroidism     Osteopenia     Postmenopausal      Past Surgical History:   Procedure Laterality Date    BTL      CATARACT EXTRACTION W/  INTRAOCULAR LENS IMPLANT Left 12/19/2018    w/ ISTENT(INJ)    CATARACT EXTRACTION W/  INTRAOCULAR LENS IMPLANT Right 07/25/2018    W/ ISTENT    CERVIX LESION DESTRUCTION      Abnormal PAP    COLONOSCOPY N/A 5/29/2019    Procedure: COLONOSCOPY;  Surgeon: To Clay MD;  [facility];  Service: Endoscopy;  Laterality: N/A;    hysteroscopic ablation      I-STENT Right 07/25/2018    DR. HENRY    PCIOL Right 07/25/2018    DR. HENRY    AR DILATION/CURETTAGE,DIAGNOSTIC      D & C    TONSILLECTOMY       Family History   Problem Relation Age of Onset    Cancer Mother         stomach cancer    Cataracts Mother     Hypertension Sister     Cancer Sister         liver cancer    Hypertension Sister     Heart disease Brother         MI/CAD    Breast cancer Daughter     No Known Problems Son     Diabetes Neg Hx     Stroke Neg Hx     Blindness Neg Hx     Glaucoma Neg Hx     Macular degeneration Neg Hx     Retinal detachment Neg Hx     Strabismus Neg Hx     Thyroid disease Neg Hx        Home Medications:     Current Outpatient Medications:     allopurinoL (ZYLOPRIM) 100 MG tablet, TAKE 1 TABLET(100 MG) BY MOUTH EVERY DAY, Disp: 90 tablet, Rfl: 1    atenoloL (TENORMIN) 100 MG tablet, Take 1 tablet (100 mg total) by mouth once daily., Disp: 90 tablet, Rfl: 1    atenoloL (TENORMIN) 50 MG tablet, Take 1 tablet (50 mg total) by mouth once daily., Disp: 90 tablet, Rfl: 1    baclofen (LIORESAL) 10 MG tablet, Take 1 tablet (10 mg total) by mouth nightly as needed. For back pain, Disp: 30  "tablet, Rfl: 2    calcium carbonate-vitamin D2 600 mg calcium- 200 unit Cap, Take 1 tablet by mouth Twice daily., Disp: , Rfl:     COMBIGAN 0.2-0.5 % Drop, INSTILL 1 DROP IN BOTH EYES TWICE DAILY, Disp: 10 mL, Rfl: 12    dextran 70-hypromellose (TEARS) ophthalmic solution, Place 1 drop into both eyes. BOTH EYES PRN , Disp: , Rfl:     hydroCHLOROthiazide (HYDRODIURIL) 25 MG tablet, Take 1 tablet (25 mg total) by mouth once daily., Disp: 30 tablet, Rfl: 11    irbesartan (AVAPRO) 300 MG tablet, TAKE 1 TABLET(300 MG) BY MOUTH EVERY DAY, Disp: 90 tablet, Rfl: 3    latanoprost 0.005 % ophthalmic solution, INSTILL 1 DROP IN BOTH EYES EVERY DAY AT BEDTIME, Disp: 2.5 mL, Rfl: 12    levothyroxine (SYNTHROID) 88 MCG tablet, Take 1 tablet (88 mcg total) by mouth every morning., Disp: 90 tablet, Rfl: 3    meloxicam (MOBIC) 15 MG tablet, TAKE 1 TABLET(15 MG) BY MOUTH DAILY AS NEEDED, Disp: 90 tablet, Rfl: 0    montelukast (SINGULAIR) 10 mg tablet, TAKE 1 TABLET(10 MG) BY MOUTH EVERY DAY, Disp: 90 tablet, Rfl: 1    pantoprazole (PROTONIX) 40 MG tablet, Take 1 tablet (40 mg total) by mouth once daily., Disp: 30 tablet, Rfl: 11    pyridostigmine (MESTINON) 60 mg Tab, Take 0.5 tablets (30 mg total) by mouth every 6 (six) hours., Disp: 60 tablet, Rfl: 3    riluzole 50 mg Tab tablet, Take 1 tablet (50 mg total) by mouth every 12 (twelve) hours., Disp: 60 tablet, Rfl: 11    Physical Examination:  Ht 5' 6" (1.676 m)   Wt 83.2 kg (183 lb 6.8 oz)   BMI 29.61 kg/m²     GENERAL:  General appearance: Well, non-toxic appearing.  No apparent distress.  Fundi exam: normal.  Neck: supple.  Carotid auscultation: normal.  Heart auscultation: normal.  Respiratory: normal.  Peripheral pulses: normal.  Extremities: normal.    MENTAL STATUS:  Alertness, attention span & concentration: normal.  Language: normal.  Orientation to self, place & time:  normal.  Memory, recent & remote: normal.  Fund of knowledge: normal.    SPEECH:  Slow " speech, occasional word slurring    CRANIAL NERVES:  Cranial Nerves II-XII were examined.  II - Visual fields: normal.  III, IV, VI: PERRL, EOMI, bilateral ptosis   V - Facial sensation: normal.  VII - Face symmetry & mobility: normal.  VIII - Hearing: normal.  IX, X - Palate: mobile & midline.  XI - Shoulder shrug: normal.  XII - Tongue protrusion: R >L weakness very mild,    GROSS MOTOR:  Gait & station: normal.  Tone: Slightly increased tone of the L lower extremity   Abnormal movements: none.  Finger-nose & Heel-knee-shin: normal.  Rapid alternating movements & drift: normal.    MUSCLE STRENGTH:     Fascics Atrophy RIGHT    LEFT Atrophy Fascics     4+ Neck Ext. 4+       5 Neck Flex 5       5 Deltoids 5       5 Sh.Ext.Rot. 5       5 Sh.Int.Rot. 5       5 Biceps 5       5 Triceps 5       5 Forearm.Pr. 5       5 Wrist Ext. 5       5 Wrist Flex 5       5 Finger Ext. 5       5 Finger Flex 5       5 FPL 5       4 Inteross. 5                         4+ Iliopsoas 4+       4+ Hip Abduct 4+       4+ Hip Adduct 4+       4+ Quads 4+       4+ Hams 4+       4+ Dorsiflex 4+       5 Plantar Flex 5       5 Ankle Jared 5       5 Ankle Invert 5       4+ Toe Ext. 4+       4+ Toe Flex 4+                         REFLEXES:    RIGHT Reflex   LEFT   2+ Biceps 2+   2+ Brachiorad. 2+   2+ Triceps 2+    Pectoralis     Jaw Jerk     Knowles's         3+ Patellar 3+   3+ Ankle 3+    Suprapatellar              Up PLANTAR Down       SENSORY:  Light touch: Normal throughout.  Sharp touch: Normal throughout.  Vibration: Normal throughout.    Diagnostic Data Reviewed:  MRI brain 1/3/22:  No acute abnormality  Moderate degree of scattered nonspecific T2/FLAIR hyperintense signal throughout the supratentorial white matter which most likely represents sequela of chronic microvascular ischemic disease.No acute abnormality     EMG 12/17/21:  This is an abnormal EMG of the right upper and lower extremities.  Due to the concerns listed in the history  above, additional testing was added to include repetitive nerve stimulation as well as EMG examination of the tongue, paraspinal muscles, and left lower extremity.  The findings are as follows:  1.  Slow repetitive stimulation of the median and facial nerves at rest and following exercise is normal.  There is no evidence, by repetitive stimulations, of a neuromuscular junction defect of the postsynaptic type, as seen with myasthenia gravis.     2. There are focal changes seen in the left lower extremity most consistent with a left L5 radiculopathy, clinical correlation is recommended.   3. The chronic, borderline changes noted in the tongue muscles are consistent with bilateral hypoglossal neuropathy.  This neuropathy can be a symptom of progressive bulbar palsy.  This study does not meet el Escorial criteria for motor neuron disease.  4. The low amplitude noted in the right extensor digitorum brevis muscle appears to be a normal variant of disuse atrophy.  There are no associated neurogenic changes.  There is no evidence of a peripheral neuropathy, plexopathy, or radiculopathy on this study.  In addition, there is no evidence of myopathy or neuromuscular junction disorder on this study.     Swallow study 12/9/21:  Pt presents with a safe functional swallow across tested consistencies.  No aspiration or penetration was observed and her swallow strength and coordination was adequate.    Assessment and Plan:    Sophia Hope is a 66 y.o.female here for follow up of ALS. Discussion with patient and family members regarding the patients diagnosis and reviewed the role of the clinic in this regard. I and the members of the multidisciplinary team have assessed Sophia Hope and have made the following recommendations:    Problem List Items Addressed This Visit        Neuro    Bulbar motor neuron disease - Primary    Relevant Orders    Ambulatory referral/consult to Physical/Occupational Therapy    HME - OTHER     Ambulatory referral/consult to Speech Therapy      Other Visit Diagnoses     Dysarthria        Relevant Orders    Ambulatory referral/consult to Speech Therapy    Spasticity        Nutritional assessment        Abnormal gait        Chronic midline low back pain without sciatica        Spondylosis of lumbar region without myelopathy or radiculopathy        Primary osteoarthritis of both knees        Primary osteoarthritis of left shoulder        Weak cough        Relevant Orders    HME - OTHER    Oropharyngeal dysphagia        Relevant Orders    Ambulatory referral/consult to Speech Therapy          The patient will return to clinic in 3 months.    Important to note, also  has a past medical history of Arthritis, Benign colon polyp, Cataract, Diverticulosis, Glaucoma (increased eye pressure), History of abnormal Pap smear, History of herpes zoster, Hypertension, Hypothyroidism, Osteopenia, and Postmenopausal.    This note was created with voice recognition software.  Grammatical, syntax and spelling errors may be inevitable.    Time Spent: I spent a total of 44 minutes on the day of the visit.This includes face to face time and non-face to face time preparing to see the patient (eg, review of tests), Obtaining and/or reviewing separately obtained history, Documenting clinical information in the electronic or other health record, Independently interpreting resultsand communicating results to the patient/family/caregiver, or Care coordination.           Xi Frank D.O, ABPN, AOBNP, ABEM     Patient seen with Fany RICHTER who acted as scribe.

## 2022-04-13 NOTE — PROGRESS NOTES
"[facility]   Physical Therapy Evaluation     at [facility]     Date: 4/13/2022    Name: Sophia Hope   MRN: [id number]    Therapy Diagnosis:   Encounter Diagnoses   Name Primary?    Bulbar motor neuron disease Yes    Dysarthria     Spasticity       Physician: Dr. Jose Drake, Dr. Xi Frank and Dr. Maria G Baltazar  Physician Orders: Ambulatory Referral for Physical Therapy at ALS Clinic  Plan of Care Expiration: Evaluation Only  Medical Diagnosis: ALS (G12.21)     Initial Clinic Date: 4/13/2022  ALS Clinic Number: 1    Time In: 0932  Time Out: 1011  Total Billable Time: 19.5 minutes     Precautions: Standard and Fall    SUBJECTIVE   Patient reports: She has had a couple falls when she is on carpet due to her feet "slipping" with socks on. Sophia states that she has noticed in the past few weeks that she is getting more fatigued than normal. Patient and family seeking recommendations from therapist on safety and appropriate activities within the home.    History of Present Illness: Sophia is a 66 y.o. female that presents to [facility] secondary to dx of bulbar upper motor neuron disease. Symptom onset was April of 2021 with primarily speech and swallowing deficits.     Medical History:   Past Medical History:   Diagnosis Date    Arthritis     Benign colon polyp     Cataract     Diverticulosis     Glaucoma (increased eye pressure)     History of abnormal Pap smear     History of herpes zoster     Right flank 4/30/12    Hypertension     Hypothyroidism     Osteopenia     Postmenopausal      Surgical History:   Sophia Hope  has a past surgical history that includes Cervix lesion destruction; hysteroscopic ablation; pr dilation/curettage,diagnostic; BTL; Tonsillectomy; PCIOL (Right, 07/25/2018); I-STENT (Right, 07/25/2018); Cataract extraction w/  intraocular lens implant (Left, 12/19/2018); Cataract extraction w/  " "intraocular lens implant (Right, 07/25/2018); and Colonoscopy (N/A, 5/29/2019).  Medications:   Sophia has a current medication list which includes the following prescription(s): allopurinol, atenolol, atenolol, baclofen, calcium carbonate-vitamin d2, combigan, dextran 70-hypromellose, hydrochlorothiazide, irbesartan, latanoprost, levothyroxine, meloxicam, montelukast, pantoprazole, pyridostigmine, and riluzole.  Allergies:   Review of patient's allergies indicates:   Allergen Reactions    Clonidine      Other reaction(s): Rash    Cardizem [diltiazem hcl] Rash     Years ago per patient        Prior or current therapy:    [x] denies current home health services or hospice care    Family Present at time of Eval: Daughter, son in law, and granddaughter     Social History: lives with their family    Home Environment: single level home with threshold entry   DME: rollator and walker      Prior Level of Function: independence   Occupation: retired; hobbies include "relaxing and watching TV"  Exercise Routine / History: None reported    Current Level of Function: modified independence     Falls: 2 falls over the last few months with patient "slipping" in her socks on the carpet; one fall resulting in stitches on the bridge of her nose.    Near falls: none reported.      Pain:  Current 0/10, worst 0/10, best 0/10   Location:  Not Applicable     Pt's goals: to obtain therapy recommendations for equipment and home safety/activity levels.     OBJECTIVE   Patient presents to clinic: manual wheelchair given by clinic    Mental status: alert, oriented to person, place, and time  Appearance: Casually dressed  Behavior:  calm and cooperative  Attention Span and Concentration:  Normal    Dominant hand:  right     Posture Alignment: [] forward head  [x] forward/rounded shoulders  [] head drop   [x] increased thoracic kyphosis  [x] sacral sitting  [] unremarkable    Lower Extremity ROM    AROM WNL    PROM WNL     Lower Extremity " Strength  Right LE  Left LE    Hip flexion: 4/5 Hip flexion: 4/5   Hip extension:  4/5 Hip extension: 4/5   Hip abduction: 4+/5 Hip abduction: 4+/5   Hip adduction: 4+/5 Hip adduction 4+/5   Knee extension: 4+/5 Knee extension: 4+/5   Knee flexion: 4+/5 Knee flexion: 4+/5   Ankle dorsiflexion: 4+/5 Ankle dorsiflexion: 4+/5   Ankle plantarflexion: 4+/5 Ankle plantarflexion: 4+/5     Tone:  No tonal abnormalities noted today in bilateral lower extremities     Edema: [x] dependent edema of bilateral lower extremities   [] pitting [] non-pitting  [] none observed today     Coordination:   - fine motor: impaired  - UE coordination: impaired  - LE coordination: intact     Initial Evaluation      5x Chair Rise Test  Attempted but unable to complete (4 sit to stands with use of arms in 42 seconds)   Functional Reach Sitting 12 inches   Functional Reach Standing 6 inches     Sitting balance static: good  Sitting balance dynamic: good  Standing balance static: fair  Standing balance dynamic:  fair    Gait:   [] patient is no longer ambulatory   - AD used: Standard Walker(with tennis balls on all 4 legs)  - Assistance: modified independence  - Distance: limited household distances  - Deviations:  decreased frankie, increased time in double stance and decreased step length     Endurance Deficit: moderate    Functional Mobility (Bed mobility, transfers)  Bed mobility: modified independence  Supine to sit: modified independence  Sit to supine: modified independence  Transfers to bed: modified independence  Transfers to toilet: modified independence  Tub/shower transfers: modified independence  Sit to stand:  modified independence  Stand pivot: modified independence  Car transfers: modified independence  Wheelchair mobility: Not Applicable       Treatment    No Treatment Provided today.     Education Provided     Education provided re: POC, Educated pt on purpose of PT services now and in the future to assist with mobility  training and adaptation education as the ALS disease process progresses. Sophia verbalized good understanding of education provided. Pt has no cultural, educational or language barriers to learning provided. Educated Sophia and her family on utilizing slippers or house shoes with  on the bottom to prevent sliding of socks on carpet and increase her safety within the home. Provided handouts for when to begin using a wheelchair (though not indicated at this moment) and interventions that should be focused on with home health therapy if they seek this in the future. Also educated on not participating in resistance training exercises at home.     Written Home Exercises Provided: none provided today    ASSESSMENT    Sophia is a 66 y.o. female referred to outpatient Physical Therapy with a medical diagnosis of ALS. Pt presents with chief complaints of 2 falls, and mild declines in her endurance. She was recommended to try slippers or house shoes with  while ambulating around her home to decrease her risk for falls. Her upper extremities seem to be more impaired than lower extremities at this time. Her lower extremity strength was relatively within functional limits, however, she quickly fatigued with the 5 times sit to stand test and was unable to complete. She was receptive to education of optimal use of assistive devices, appropriate home activities, and handouts for future use as needed. No further PT needs at this time.       Equipment recommendations:    none     Order/referral recommendations:    none   (OT placing order for home health OT to provide home evaluation)      Pt prognosis is Fair.     Plan of care discussed with patient: Yes  Pt's spiritual, cultural and educational needs considered and patient is agreeable to the plan of care.    Anticipated Barriers for therapy: Progressive nature of ALS     Medical Necessity is demonstrated by the following  History  Co-morbidities and personal factors that  may impact the plan of care Co-morbidities: see above     Personal Factors:   age     moderate   Examination  Body Structures and Functions, activity limitations and participation restrictions that may impact the plan of care Body Regions:   back  lower extremities  upper extremities  trunk    Body Systems:    strength  gross coordinated movement  balance  gait  transfers  transitions  motor control  motor learning  heart rate  respiratory rate  blood pressure  edema    Activity limitations:   Learning and applying knowledge  no deficits    General Tasks and Commands  undertaking a single task  undertaking multiple tasks    Communication  communicating with/receiving spoken language    Mobility  lifting and carrying objects  fine hand use (grasping/picking up)  walking  moving around using equipment (WC)  using transportation (bus, train, plane, car)  driving (bike, car, motorcycle)    Self care  washing oneself (bathing, drying, washing hands)  caring for body parts (brushing teeth, shaving, grooming)  toileting  dressing  eating  drinking  looking after one's health    Domestic Life  shopping  cooking  doing house work (cleaning house, washing dishes, laundry)  assisting others    Life Areas  employment    Community and Social Life  community life  recreation and leisure         high   Clinical Presentation unstable clinical presentation with unpredictable characteristics high   Decision Making/ Complexity Score: high       Plan   Plan of care Certification: 4/13/2022 Evaluation Only    Pt to continue to follow up with referring provider and ALS clinic at physician recommended intervals.       Kristopher Reid, PT

## 2022-04-13 NOTE — PROGRESS NOTES
CHIEF COMPLAINT:  Nutrition Counseling (Initial ALS clinic)    HISTORY OF PRESENT ILLNESS: Sophia Hope is a 66 y.o. female with onset of speech slowing and hoarseness in May/June 2021, then left leg weakness.  No history of lung disease, notes dyspnea while coordinating swallow.  Otherwise comfortable.  No orthopnea.  + sinus congestion treated year round with montelukast, more active now.  Occ excessive saliva.          PAST MEDICAL HISTORY:    Past Medical History:   Diagnosis Date    Arthritis     Benign colon polyp     Cataract     Diverticulosis     Glaucoma (increased eye pressure)     History of abnormal Pap smear     History of herpes zoster     Right flank 4/30/12    Hypertension     Hypothyroidism     Osteopenia     Postmenopausal        PAST SURGICAL HISTORY:    Past Surgical History:   Procedure Laterality Date    BTL      CATARACT EXTRACTION W/  INTRAOCULAR LENS IMPLANT Left 12/19/2018    w/ ISTENT(INJ)    CATARACT EXTRACTION W/  INTRAOCULAR LENS IMPLANT Right 07/25/2018    W/ ISTENT    CERVIX LESION DESTRUCTION      Abnormal PAP    COLONOSCOPY N/A 5/29/2019    Procedure: COLONOSCOPY;  Surgeon: To Clay MD;  Location: [facility];  Service: Endoscopy;  Laterality: N/A;    hysteroscopic ablation      I-STENT Right 07/25/2018    DR. HENRY    PCIOL Right 07/25/2018    DR. HENRY    LA DILATION/CURETTAGE,DIAGNOSTIC      D & C    TONSILLECTOMY         FAMILY HISTORY:                Family History   Problem Relation Age of Onset    Cancer Mother         stomach cancer    Cataracts Mother     Hypertension Sister     Cancer Sister         liver cancer    Hypertension Sister     Heart disease Brother         MI/CAD    Breast cancer Daughter     No Known Problems Son     Diabetes Neg Hx     Stroke Neg Hx     Blindness Neg Hx     Glaucoma Neg Hx     Macular degeneration Neg Hx     Retinal detachment Neg Hx     Strabismus Neg Hx     Thyroid disease Neg Hx        SOCIAL  HISTORY:          Occupation / Environment: Tax preparation, retired twice, 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  Social support: lives in [place], daughter Petty Sandoval, granddaughter Elena  Social History     Tobacco Use   Smoking Status Former Smoker    Years: 5.00    Types: Cigarettes    Quit date: 1983    Years since quittin.8   Smokeless Tobacco Never Used       ALLERGIES:    Review of patient's allergies indicates:   Allergen Reactions    Clonidine      Other reaction(s): Rash    Cardizem [diltiazem hcl] Rash     Years ago per patient       CURRENT MEDICATIONS:    Current Outpatient Medications   Medication Sig Dispense Refill    allopurinoL (ZYLOPRIM) 100 MG tablet TAKE 1 TABLET(100 MG) BY MOUTH EVERY DAY 90 tablet 1    atenoloL (TENORMIN) 100 MG tablet Take 1 tablet (100 mg total) by mouth once daily. 90 tablet 1    atenoloL (TENORMIN) 50 MG tablet Take 1 tablet (50 mg total) by mouth once daily. 90 tablet 1    baclofen (LIORESAL) 10 MG tablet Take 1 tablet (10 mg total) by mouth nightly as needed. For back pain 30 tablet 2    calcium carbonate-vitamin D2 600 mg calcium- 200 unit Cap Take 1 tablet by mouth Twice daily.      COMBIGAN 0.2-0.5 % Drop INSTILL 1 DROP IN BOTH EYES TWICE DAILY 10 mL 12    dextran 70-hypromellose (TEARS) ophthalmic solution Place 1 drop into both eyes. BOTH EYES PRN       hydroCHLOROthiazide (HYDRODIURIL) 25 MG tablet Take 1 tablet (25 mg total) by mouth once daily. 30 tablet 11    irbesartan (AVAPRO) 300 MG tablet TAKE 1 TABLET(300 MG) BY MOUTH EVERY DAY 90 tablet 3    latanoprost 0.005 % ophthalmic solution INSTILL 1 DROP IN BOTH EYES EVERY DAY AT BEDTIME 2.5 mL 12    levothyroxine (SYNTHROID) 88 MCG tablet Take 1 tablet (88 mcg total) by mouth every morning. 90 tablet 3    meloxicam (MOBIC) 15 MG tablet TAKE 1 TABLET(15 MG) BY MOUTH DAILY AS NEEDED 90 tablet 0    montelukast (SINGULAIR) 10 mg tablet TAKE 1 TABLET(10 MG) BY MOUTH EVERY DAY 90 tablet 1     "pantoprazole (PROTONIX) 40 MG tablet Take 1 tablet (40 mg total) by mouth once daily. 30 tablet 11    pyridostigmine (MESTINON) 60 mg Tab Take 0.5 tablets (30 mg total) by mouth every 6 (six) hours. 60 tablet 3    riluzole 50 mg Tab tablet Take 1 tablet (50 mg total) by mouth every 12 (twelve) hours. 60 tablet 11     No current facility-administered medications for this visit.                  REVIEW OF SYSTEMS:   Pulmonary related symptoms as per HPI.  Gen:  + weight loss, no fever, no night sweats  HEENT:  + sinus congestion, + dysphagia, ++ voice changes - slowed, no sialorrhea  CV:  no chest pain, no orthopnea, no paroxysmal nocturnal dyspnea  GI:  no melena, no hematochezia, no diarrhea, no constipation.  :  no dysuria, no hematuria, no incontinence  Neuro:  + focal weakness.  Sleep:  restful         PHYSICAL EXAM:   Respiratory Rate: 14 NIV during clinic visit?  Vitals:    04/13/22 0831   Weight: 83.2 kg (183 lb 6.8 oz)   Height: 5' 6" (1.676 m)     GENERAL:  Alert, calm, in no  distress  HEENT:  Normal conjunctiva, ptosis, normal EOM, nasal and oral mucosa normal, tongue normal  NECK:  supple; no palpable lymphadenopathy or masses, no jugular venous distention.  CVS: regular rate and rhythm, no murmers, gallops or rubs  PULM: Grossly normal inspiratory capacity, normal to percussion and palpation, clear to auscultation bilaterally with no wheezes, crackles or rhonchi, ++ accessory muscle use with inspiratory effort  ABDOMEN:  soft nontender/nondistended, rise with inspiration, contraction with cough  EXTREMITIES no cyanosis, clubbing, tr edema  NEURO:  Focal weakness, ambulatory with support    CONTRIBUTORY STUDIES:     PULMONARY FUNCTION TESTS: FVC 63% (1.68), , 98%      ACTIVE PULMONARY PROBLEMS:    ICD-10-CM ICD-9-CM    1. Bulbar motor neuron disease  G12.20 335.20    2. Dysarthria  R47.1 784.51    3. Spasticity  R25.2 781.0    4. Nutritional assessment  Z00.8 V72.85    5. Abnormal gait  R26.9 " 781.2    6. Chronic midline low back pain without sciatica  M54.50 724.2     G89.29 338.29    7. Spondylosis of lumbar region without myelopathy or radiculopathy  M47.816 721.3    8. Primary osteoarthritis of both knees  M17.0 715.16    9. Primary osteoarthritis of left shoulder  M19.012 715.11        ASSESSMENT&PLAN:  1. Neuromuscular respiratory weakness  2. Decreased cough efficacy.  Will benefit from mechanical cough assist device daily and as needed for emergent clearance of aspirated secretions or food.  Preliminary settings should be +40 cm H2O and -40 cm H2O, with low inspiratory flow, titrate to enhance compliance.  Manual cough assist is proven less effective than mechanical cough assist for patients with ALS, and is not indicated for cough support in this population.  CPT and flutter valve are not effective or indicated in ALS patients.  3. Allergic rhinitis - add cetirizine to montelukast      No follow-ups on file.      [Greater than 50% of this 35 minute visit spent counseling patient and family]

## 2022-04-13 NOTE — PROGRESS NOTES
Subjective:       Patient ID: Sophia Hope is a 66 y.o. female.    Chief Complaint: No chief complaint on file.      HPI    Mrs. Hope is a 66-year-old female who is presenting to the ALS Clinic for multidisciplinary care.  She was diagnosed with motor neuron disease after a progressive course of trouble walking and bulbar symptoms.  Her past medical history is also significant hypertension, hypothyroidism, osteopenia and arthritis She is being seen by the physical medicine and rehabilitation service for pain management.    The patient has been complaining low back pain for couple years.  She denies any preceding injuries.  Her pain has been up and down.  It is usually flares up once every couple months.  She recalls having x-rays and being told she has DJD of the lumbar spine.  Currently her low back pain is intermittent aching in the lower lumbar spine.  She denies any radiation to her legs it is aggravated mostly by weather changes.  Her max pain is 3-4/10 and minimum 0/10.  Today it is 0 10.  She has mild lower extremity weakness.  She denies any leg numbness.  She denies any bowel or bladder incontinence    The patient has history of chronic knee pain.  X-rays in 09/2021 showed OA.  Her knee pain has been under good control.  She also has intermittent left shoulder pain.  Review of the chart shows that she had x-rays of the left shoulder in 2011 which were positive for degenerative changes.    She is currently taking:  Meloxicam 15 mg p.o. once per day (prescribed by her Primary Care Provider).  Baclofen 10 mg p.r.n. for pain and spasms, usually few times month  Tylenol p.r.n. usually few times per month.        Past Medical History:   Diagnosis Date    Arthritis     Benign colon polyp     Cataract     Diverticulosis     Glaucoma (increased eye pressure)     History of abnormal Pap smear     History of herpes zoster     Right flank 4/30/12    Hypertension     Hypothyroidism     Osteopenia      Postmenopausal         Review of patient's allergies indicates:   Allergen Reactions    Clonidine      Other reaction(s): Rash    Cardizem [diltiazem hcl] Rash     Years ago per patient        Review of Systems   Constitutional: Positive for fatigue. Negative for chills and fever.   HENT: Negative for trouble swallowing.    Eyes: Negative for visual disturbance.   Respiratory: Negative for shortness of breath.    Cardiovascular: Negative for chest pain.   Gastrointestinal: Positive for constipation. Negative for blood in stool, nausea and vomiting.   Genitourinary: Negative for difficulty urinating.   Musculoskeletal: Positive for arthralgias, back pain and gait problem. Negative for neck pain.   Neurological: Positive for speech difficulty and weakness. Negative for dizziness and headaches.   Psychiatric/Behavioral: Negative for behavioral problems.             Objective:      Physical Exam  Vitals reviewed.   Constitutional:       General: She is not in acute distress.     Appearance: She is well-developed.      Comments: Coming to the clinic in a manual wheelchair propelled by family member     HENT:      Head: Normocephalic and atraumatic.   Musculoskeletal:      Cervical back: Normal range of motion.      Comments: BUE:  ROM:   RUE: full.   LUE: full.  Strength:    RUE: 5/5 at shoulder abduction, 5 elbow flexion, 5 elbow extension, 5 hand .   LUE: 5/5 at shoulder abduction, 5 elbow flexion, 5 elbow extension, 5 hand .  Sensation to pinprick:   RUE: intact.   LUE: intact.      BLE:  ROM:     RLE: full. Mild increase tone.     LLE: full. Mild increase tone.   Knee crepitus.:     RLE: +ve.      LLE: +ve.   Strength:    RLE: 4/5 at hip flexion, 5 knee extension, 5 ankle DF/PF.   LLE: 4/5 at hip flexion, 5 knee extension, 5 ankle DF/PF.  Sensation to pinprick:     RLE: intact.      LLE: intact.   SLR (sitting):      RLE: -ve.      LLE: -ve.     -ve tenderness over lumbar spine.     Skin:     General: Skin  is warm.   Neurological:      Mental Status: She is alert and oriented to person, place, and time.         Assessment:       1. Bulbar motor neuron disease    2. Dysarthria    3. Spasticity    4. Nutritional assessment    5. Abnormal gait    6. Chronic midline low back pain without sciatica    7. Spondylosis of lumbar region without myelopathy or radiculopathy    8. Primary osteoarthritis of both knees    9. Primary osteoarthritis of left shoulder        Plan:         - Continue meloxicam 15 mg tablets, take 1 tablet by mouth once daily  - Continue Tylenol 500 mg tablets, 1-2 tablets by mouth 3 times per day as needed for pain  - Continue baclofen 10 mg tablets, 1 tablet by mouth 3 times per day as needed for muscle spasms  - Follow up in ALS clinic.      This note was partly generated with jigl voice recognition software. I apologize for any possible typographical errors.

## 2022-04-13 NOTE — PROGRESS NOTES
[facility]      SPEECH THERAPY NEUROLOGICAL REHABILITATION EVALUATION    ALS MULTIDISCIPLINARY CLINIC     Date: 4/13/2022    Name: Sophia Hope   MRN: [id number]    Therapy Diagnosis:   Encounter Diagnoses   Name Primary?    Bulbar motor neuron disease Yes    Dysarthria     Spasticity     Nutritional assessment       Physician: Dr. Jose Drake and Dr. Xi Frank  Physician Orders: Ambulatory Referral for Speech  Plan of Care Expiration: Evaluation Only  Medical Diagnosis: ALS     Initial Clinic Evaluation Date: 4/13/2022  ALS Clinic Number: 1    Time In: 8:27am  Time Out: 9:11 am  Procedure Min.   Evaluation of Speech Sound Production  30   Swallow and Oral Function Evaluation   14     Precautions: progressive degenerative disease, Standard and Fall, speech difficulty     Subjective   Date of Onset: March/April 2022. Symptoms began with left sided weakness, voice changes, and speech and swallowing difficulties   History of Current Condition: Sophia Hope  presents to the [facility] secondary to the diagnosis of ALS. No new medical changes since last clinic. This is her first clinic visit. However patient reported changes in her speech and family has more difficulty understanding her. Patient also has difficulty closing and keeping her eyes closed. She reported choking and gasping when swallowing thin liquids. Patient's daughter, son-in-law, and granddaughter were present today.   Chief Complaint: Speech and swallowing difficulties   Past Medical History:  has a past medical history of Arthritis, Benign colon polyp, Cataract, Diverticulosis, Glaucoma (increased eye pressure), History of abnormal Pap smear, History of herpes zoster, Hypertension, Hypothyroidism, Osteopenia, and Postmenopausal. oSphia Hope  has a past surgical history that includes Cervix lesion destruction; hysteroscopic ablation; pr  "dilation/curettage,diagnostic; BTL; Tonsillectomy; PCIOL (Right, 07/25/2018); I-STENT (Right, 07/25/2018); Cataract extraction w/  intraocular lens implant (Left, 12/19/2018); Cataract extraction w/  intraocular lens implant (Right, 07/25/2018); and Colonoscopy (N/A, 5/29/2019).  Medical Hx and Allergies:  Sophia has a current medication list which includes the following prescription(s): allopurinol, atenolol, atenolol, baclofen, calcium carbonate-vitamin d2, combigan, dextran 70-hypromellose, hydrochlorothiazide, irbesartan, latanoprost, levothyroxine, meloxicam, montelukast, pantoprazole, pyridostigmine, and riluzole.   Review of patient's allergies indicates:   Allergen Reactions    Clonidine      Other reaction(s): Rash    Cardizem [diltiazem hcl] Rash     Years ago per patient     Prior Therapy: None                      Home Health Therapy at present time: No  Social History: Sophia lives in [place] with her daughter, Petty, son-in-law, Ava, and three grandchildren. Her son, Jose M, and his family live in the same neighborhood.  Prior Level of Function:  Independent  Current Level of Function: Modified independent for communication  Nutrition: regular with thin liquids  Recent MBSS:  12/9/2021- "Pt presents with a safe functional swallow across tested consistencies.  No aspiration or penetration was observed and her swallow strength and coordination was adequate."   Pain: 0/10  Pain Location/Description: n/a   Respiratory Status: Room air, appeared to be within functional limits   Vision: Appeared to be within functional limits. Reports she wears reading glasses, although she did not have them today.   Hearing: Appeared to be within functional limits in conversation. Will monitor and refer as necessary.   Objective     Patient's motor speech, fluency, and voice were informally assessed. OME performed within Cranial Nerve Assessment detailed below. Motor speech skills were assessed and were not " functional for daily communication with a variety of communication partners (i.e. Family, friends, medical personnel).   Respiration / Phonation:   Average Norms  /WFL Maximum Phon. Time (ah) Words Per Minute:   P^ 10  5.0-7.1 2  Trial 1: 8.9 99  words per minute   T^ 12  4.8-7.1 2.4  Trial 2:  12.7 >125 = WFL    K^ 10  4.4-7.4 2   <125 = refer for AAC eval   P^T^K^ 5  3.6-7.5 1  Avg: 10.8 Norm: 160-170  (paragraph reading)       Norm (F 10-26, M 13-34.6) Norm: 150 to 250 (norm conversation)        Phonation Oral Reading Conversation   Stimulus Sustained ah:   The [place] History and Conversation   Quality hoarse, dysarthria, muffled, nasal hoarse, dysarthria, muffled, nasal hoarse, dysarthria, muffled, nasal   Duration  10.8 seconds  1 minute  N/a   Loudness   variable Loudness  variable Loudness  variable Loudness   Steadiness varied pitch varied pitch WFL     Overall Speech Intelligibility: fair; 100% intelligible but demonstrated muffled, nasal vocal quality with decreased loudness with slow, imprecise articulation. Reduced breath-speech coordination evident.  Fatigue affects intelligibility.     Awareness / Strategy Use:   Patient demonstrates  adequate awareness of motor speech impairment. Speech was 100% intelligible with Sophia requiring no cues but patient has to repeat herself as speech becomes more imprecise.    Impact of Motor Speech Impairment on Functioning:   Decreased speech intensity and clarity negatively impacts functional communication in all/most contexts.    Safety Risks: Decreased efficiency and effectiveness to communicate in an emergency situation.     Communicative Effectiveness Survey: is a questionnaire given to the patient to  the effectiveness of her communicative function.       Communication Situation  Rating on a scale of 1 - 4  1= not at all effective   4= very effective   Having a conversation with a family member or friends at home  2   Participating in  conversation with strangers in a quiet place 3    Conversing with a familiar person over the telephone  2   Conversing with a stranger over the telephone  2    Being part of a conversation in a noisy environment (social gathering) 2    Speaking to a friend when you are emotionally upset of you are angry 2    Having a conversation while traveling in a car 2    Having a conversation with someone at a distance (across a room) 1     Augmentative/Alternative Communication (AAC): Patient is not currently using an augmentative/ alternative communication device. She will be monitored for the need of a full AAC evaluation given current motor speech and voice skills and progressive nature of ALS disease. It was recommended that the patient use communication apps such as Speech Assistant to supplement speech.   Type of AAC Device:  n/a  Recommend AAC Eval: no        Clinical Swallow Exam/ Cranial Nerve Exam:  Cranial Nerve 5: Trigeminal Nerve  Motor Jaw Posture at rest: Closed  Mandible Elevation/Depression: WFL  Mandible lateralization: WFL  Abnormal movement: absent Interpretation: within functional limits   Sensory Forehead: WFL  Cheek: WFL  Jaw: WFL  Facial Pain: None noted Interpretation: within functional limits     Cranial Nerve 7: Facial Nerve  Motor Facial Symmetry:  WNL  Wrinkle Forehead: WFL  Close eyes tightly: reduced, unable to keep eyes closed  Labial Protrusion: reduced  Labial Retraction: reduced  Labial alternating movement: reduced  Cheek puffing sustained: reduced  Cheek puffing against resistance: reducferd  Abnormal movement: absent Interpretation: reduced   Sensory Formal testing not completed. Pt denied any changes in taste      Cranial Nerves IX and X: Glossopharyngeal and Vagus Nerves  Motor Palatal Symmetry (Rest): WNL  Palatal Symmetry: (Movement) WNL  Palatal Elevation (Movement): WNL   Palatal Elevation (Sustained): WNL  Cough: Perceptually weak  Voice Prior to PO intake: hoarse  Resonance:  muffled  Abnormal movement: absent Interpretation: within functional limits     Cranial Nerve XII: Hypoglossal Nerve  Motor Tongue at rest: atrophy and fasciculations  Lingual Protrusion: reduced  Lingual Protrusion against Resistance: reduced  Lingual Lateralization: reduced  Abnormal movement: present Interpretation: reduced     Other information:   Volitional Swallow: Able to palpate laryngeal rise   Mucosal Quality: No abnormal findings   Secretion Management: Patient reported no drooling but saliva pooling.   Dentition: Good condition for speech and mastication      EAT-10 Score:    Eating Assessment Tool (EAT-10) is a questionnaire given to the patient to  her swallowing function.     Key: 0= no problem; 4= severe problem  1. My swallowing problem has caused me to lose weight. - 0  2. My swallowing problem interferes with my ability to go out for meals. - 0  3. Swallowing liquids takes extra effort. - 3  4. Swallowing solids takes extra effort. - 1  5. Swallowing pills takes extra effort. - 1  6. Swallowing is painful. - 0  7. The pleasure of eating is affected by my swallowing. - 1  8. When I swallow food sticks in my throat. - 2  9. I cough when I eat. - 0  10. Swallowing is stressful. - 2    Sophia ranked her swallowing function as a 10/40     Interpretation: Score of greater than 3 is indicative of a swallowing disorder (Zoila et al., 2008); higher scores correlate with increased penetration-aspiration  scale scores, and scores greater than 15 results in the patient being 2.2 times more likely to aspirate (Namita et al., 2015)    References:   NA Cummings., MICKEY Bhatt., El, CATRACHO DEL VALLE., RONALD Barone., Zoya, G. N., RONALD Salguero, & Ash, JAYE DEL VALLE. (2008). Validity and reliability of the Eating Assessment Tool (EAT-10). Annals of Otology, Rhinology & Laryngology, 117(12), 919-924. [email or web address]://doi.[email or web address]/[email or web address]  MICKEY Breaux., IRON Hatch, RONALD Jensen., Quincy, M. A., & NA Cummings  CLIFTON. (2015). The ability of the 10-item eating assessment tool (EAT-10) to predict aspiration risk in persons with dysphagia. Annals of Otology, Rhinology & Laryngology, 124(5), 351-354. [email or web address]    PILL-5: (Richmond et al., 2019)    Key: 0= never; 1= almost never; 2=sometimes; 3= almost always; 4=always  1. Pills stick in my throat- 1  2. Pills stick in my chest- 0  3. I have a fear of swallowing pills- 0  4. My problem swallowing pills interferes with my ability to take my medicine- 0  5. I cant take my pills without crushing, coating, or using other forms of assistance- 0  Total score: 1/20    Interpretation*: less than 6 results in minimal or no pill dysphagia; greater than or equal to 6 but less than 11 results in mild to moderate, may manage with pill lubricants*; and greater than or equal to 11 results in moderate to severe pill dysphagia, may require an altered formulation of medication    *Referral to a swallowing specialist is warranted in individuals with a PILL-5 is greater than or  equal to 6 to rule out obstructing pathology such an esophageal or cricopharyngeal stricture or web that may be easily treatable.    Reference: TONG Fragoso., POLLY Guerrier, CRISTOBAL Kim, IRON Montalvo, ALEK Crawford, Quincy, MMj A., & NA Cummings. (2019). Validation of the PILL-5: a 5-item patient reported outcome measure for pill dysphagia. Frontiers in surgery, 6, 43.https://[email or web address]/[email or web address]    Independence Swallow Protocol:  PASSED  Independence Swallow Protocol dictates pt remain NPO if fail screener; (Raphaelr et al. 2014) however, as objective swallow assessment is not available for greater than a week, pt will remain on current diet until objective assessment is completed unless otherwise indicated.     Clinical Swallow Examination:   Pt presented with:   THIN:- 3 oz water test    PUREE:- tsp bites of pudding x2    SOLID: -bite of jorge luis cracker x1     DESCRIPTION: Oral Phase: The  patient had no anterior loss of the bolus with adequate closure of the lips around the cup. No residue remained in the oral cavity following the swallow of pudding or jorge luis cracker.   Pharyngeal:  Laryngeal elevation palpated. Gasping sound noted after drinking multiple sips of water. Patient with overt clinical signs/symptoms of aspiration on any PO trials given.   Recommend Modified Barium Swallow Study (MBSS) or Fiberoptic Endoscopic Evaluation of Swallowing (FEES): yes    Education   Patient and her family were educated on the recommendations, AAC, voice banking, message banking and swallowing precautions. They verbalized understanding of all discussed.     Assessment   Impressions: Sophia Hope exhibited mixed spastic-flaccid dysarthria c/b imprecise consonants, slow rate of speech and inadequate breath- speech coordination  and hypernasality, low intensity, hoarseness  and muffled vocal quality and suspected oropharyngeal dysphagia c/b patient reporting swallowing liquids takes extra effort, food sticking in her throat occasionally when swallowing, and coughing and gasping on thin liquids  due to ALS. Demonstrates impairments including limitations as described in the problem list. Positive prognostic factors include family support. Negative prognostic factors include progressive nature of disease. Barriers to progress include complex medical history and progressive nature of disease.     Patient's spiritual, cultural and educational needs considered and pt agreeable to plan of care and goals.    ALS Severity Scale:  Stage 3: Behavioral Modifications: Speaking rate is much slower than normal. The speaker repeats specific words in adverse listening situations and may limit the complexity or length of messages. ALS Severity Scale: 5 or 6       Rehab Potential: fair to guarded     Plan    Recommended Treatment Plan:   1. Follow up with [facility] in 3 months   2. Home health ST to  train use of communication apps and monitor for AAC. Monitor swallowing function  3. FEES  4. Initiate voice banking/message banking     Plan of care expiration: Eval Only     Other Recommendations: none    SAVITA Dejesus SLP Graduate Clinician    I certify that I was present in the room directing the student in service delivery and guiding them using my skilled judgment. As the co-signing therapist I have reviewed the students documentation and am responsible for the treatment, assessment, and plan.     SUMMER Manning., CCC-SLP, CBIS  Speech-Language Pathologist

## 2022-04-13 NOTE — PATIENT INSTRUCTIONS
"ALS Clinic Notes  Neurology:      Respiratory:  We will order the cough assist machine and portable suction machine. Please learn how to do the cough assist machine and use it daily to get comfortable with it - very helpful if you something in your bronchial tubes that is hard to cough up.    PMR:      Physical Therapy: try slippers or house shoes with  on the bottom to increase safety and decrease risk for falls when on the carpet.       Occupational Therapy: OT HH for home eval/Fall prevention and equipment suggestions  See below for energy banking       Speech Language Pathology: 1. Home health speech therapy for training on compensatory speech strategies and use communication apps such as Speech Assistant to supplement speech.  2. Fiberoptic Endoscopic Evaluation of Swallowing (FEES) to further assess swallowing.  3. Initiate voice banking and message banking. Sign up with Team [employer] for assistance ([email or web address]).       Nutrition:  continue high calorie diet as tolerated, with po liquid supplements ad tracey      :            Saving Energy: Motion Economy     AVOID RUSHING   Organize activities and try to do them at the same way at all times. Repetition  makes you proficient and will save time and energy.    Take frequents rests. This will prevent over fatigue and leave you ready to go on  with other activities. Do diaphragmatic breathing when resting.   Spread heavy and light tasks throughout the day. Consider when the best time is  for each activity. When do you have the most energy and move your best?   Set priorities and eliminate unnecessary tasks.   Analyze each task to be done, develop an easier method- "work simplification".    AVOID UNNECESSARY MOTIONS   Sit instead of stand for any lengthy task (greater than 5-10 minutes).   Use both hands in a smooth flowing motion, in opposite and symmetrical  Direction.   Avoid holding or lifting. Use a wheeled cart or slide things.   Avoid " over reach and bending by arranging work area within normal reach.   Avoid lifting, pushing, or carrying heavy things.   Avoid running up or down stairs.   Arrange work space for specific tasks with supplies and equipment arranged well.   Live simply, avoid unnecessary cluttering of items.    Use Labor saving equipment.   Good posture prevents fatigue.    USE GOOD BODY MECHANICS   Use stronger muscles to push, pull, lifting, and climbing.    PROPER WORKING CONDITIONS   Correct counter height for standing- 2 inch below bent elbow.   Correct chair height. 2 inch below elbow.    Work within range of reach putting frequent used items for easier reach.    Obtain items needed prior to starting task.    Good air flow.   Good lighting.   Work in a Relaxed manner- use music, simplify task, use diaphragmatic breathing, walk slowly.   Exhale when you do strenuous part of activity.    Mental hygiene- control worrying, use your energy to change things you can and accept those things you can't change.   Encourage family teamwork to help.           Energy Conservation  TAKE TIME to plan a good work program.   Pace yourself daily and build activity level slowly.   Space activities over a period of time; use a calendar to plan ahead.   Plan a balance of work, recreation, exercise and rest.     CANCEL all tasks that are not necessary.   Let dishes air dry; eliminate ironing if possible.   Put away furnishing that need extra work. For example, knick knacks that  need dusting.    DELEGATE responsibility to others   Allow family and friends to help with tasks that are difficult or tiring.    ARRANGE equipment and supplies to save time and movement.    Organize work areas to make tools or appliances easy to reach and grasp.    SIT to work whenever possible.   Keep a stool or chair at different work areas; specifically kitchen and  Bathroom. Sit to fold clothes or to do food prep are examples of when to sit.    AVOID holding and carrying  objects.   Use a cart on wheels to move items like garbage cans or laundry. Use rolling    Mop bucket.    Slide whenever possible like pots on countertop.    ADJUST the heights of work surfaces for good posture.   Work surface should be 2 inches below elbows. Have materials located near  you.     INCLUDE rest periods in your daily schedule.   Avoid fatigue, rest before you tire.   Hurry and worry will increase fatigue.    SIMPLIFY your methods of work until they are habit.   Use prepared foods, one step or one meal dishes.   Serve from pots on stove instead of transferring to serving dishes.   Do not begin activities that cannot be stopped if it is too fatiguing.   Use energy saving equipment (, electric can opener, etc.).        Grooming: Brushing Teeth        Use electric toothbrush to brush teeth. Sit to brush. Support elbow on table or counter.  Copyright © I. All rights reserved.     Grooming: Drying Hair        Support elbows to dry hair. This is a good technique for other grooming tasks. Move head to save arm movements.  Copyright © I. All rights reserved.     Grooming: Shaving        Sit in chair to use electric shaver.  Support arm using other hand, or rest elbow on vanity.  Copyright © I. All rights reserved.     Bathing: Drying Off        Put on ganesh ayala immediately after stepping out of shower or bath.  Sit down and pat to dry off.  Copyright © I. All rights reserved.     Food Preparation: Pots / Dishware        Do not lift pots to serve food. Instead, tip pots and ladle food onto plates. Avoid heavy pans and dishes as much as possible.  Copyright © I. All rights reserved.     Home Management: Pots / Pans        Leave frequently used pots on stove or counter instead of putting away and taking out each time.  Copyright © I. All rights reserved.     Kitchen Clean Up        Place towel on work surface and washable non-skid mat on floor to minimize need to wipe counter or mop  floor.  Copyright © I. All rights reserved.     Home / Work Management: Laundry - Loading Wash        When preparing clothes for washing, keep items at waist height. Choose a top-loading rather than front-loading washing machine, if possible.  Copyright © I. All rights reserved.     Housework: Sweeping        When sweeping, avoid bending over. Instead use a long-handled broom and dustpan.  Copyright © I. All rights reserved.     Making Bed        Try sitting when tucking sheets.  Place bed in area where you can reach all corners without straining.  Use lightweight bedding. Down comforter or cotton coverlet can serve as cover.  Copyright © I. All rights reserved.     Gardening        Use a tool belt or vest to carry tools. This saves extra trips to the tool shed. Alternate heavy work such as pruning, with lighter tasks. Sit whenever possible. Rest often.  Copyright © I. All rights reserved.

## 2022-04-13 NOTE — PROGRESS NOTES
"Referring Physician:  Jose Drake MD    Reason for visit:  Chief Complaint   Patient presents with    Nutrition Counseling     Initial ALS clinic    SLP Initial Evaluation      Initial Visit    :1955     Allergies Reviewed  Meds Reviewed    Anthropometrics  Weight:83.2 kg (183 lb 6.8 oz) - standing scale  Height:5' 6" (1.676 m)  BMI:Body mass index is 29.61 kg/m².   IBW: 59.0 kg  +/-10%    Meds:  Outpatient Medications Prior to Visit   Medication Sig Dispense Refill    allopurinoL (ZYLOPRIM) 100 MG tablet TAKE 1 TABLET(100 MG) BY MOUTH EVERY DAY 90 tablet 1    atenoloL (TENORMIN) 100 MG tablet Take 1 tablet (100 mg total) by mouth once daily. 90 tablet 1    atenoloL (TENORMIN) 50 MG tablet Take 1 tablet (50 mg total) by mouth once daily. 90 tablet 1    baclofen (LIORESAL) 10 MG tablet Take 1 tablet (10 mg total) by mouth nightly as needed. For back pain 30 tablet 2    calcium carbonate-vitamin D2 600 mg calcium- 200 unit Cap Take 1 tablet by mouth Twice daily.      COMBIGAN 0.2-0.5 % Drop INSTILL 1 DROP IN BOTH EYES TWICE DAILY 10 mL 12    dextran 70-hypromellose (TEARS) ophthalmic solution Place 1 drop into both eyes. BOTH EYES PRN       hydroCHLOROthiazide (HYDRODIURIL) 25 MG tablet Take 1 tablet (25 mg total) by mouth once daily. 30 tablet 11    irbesartan (AVAPRO) 300 MG tablet TAKE 1 TABLET(300 MG) BY MOUTH EVERY DAY 90 tablet 3    latanoprost 0.005 % ophthalmic solution INSTILL 1 DROP IN BOTH EYES EVERY DAY AT BEDTIME 2.5 mL 12    levothyroxine (SYNTHROID) 88 MCG tablet Take 1 tablet (88 mcg total) by mouth every morning. 90 tablet 3    meloxicam (MOBIC) 15 MG tablet TAKE 1 TABLET(15 MG) BY MOUTH DAILY AS NEEDED 90 tablet 0    montelukast (SINGULAIR) 10 mg tablet TAKE 1 TABLET(10 MG) BY MOUTH EVERY DAY 90 tablet 1    pantoprazole (PROTONIX) 40 MG tablet Take 1 tablet (40 mg total) by mouth once daily. 30 tablet 11    pyridostigmine (MESTINON) 60 mg Tab Take 0.5 tablets (30 " mg total) by mouth every 6 (six) hours. 60 tablet 3    riluzole 50 mg Tab tablet Take 1 tablet (50 mg total) by mouth every 12 (twelve) hours. 60 tablet 11     No facility-administered medications prior to visit.       Food/Drug Interactions Noted:  n/a    Vitamins/Supplements/Herbs:  Vit D/Calcium (difficult to swallow)    Labs:   Alb  3.6     Nutrition Prescription: 1770 Kcals/day( 30 kcal/kg IBW),  59 g protein( 1.0 g/kg IBW)     Support System:   Pt, her daughter, BRENDAN and granddaughter present for today's encounter.  Pt lives with her daughter, who does the grocery shopping.  Pt's son lives in the same neighborhood, and prepares hot lunch every day for pt and brings it to her.  Pt is alone during the day, as daughter and BRENDAN work.  Pt is able to get food/beverages from kitchen for herself.    Diet Hx:   Pt states she is not having any difficulty chewing/swallowing, and is satisfied with eating two regular meals/day.  Has a small portion-sized snack in the evening.  Has been drinking one vanilla Boost/Boost Plus every day.  Family uses [employer] for groceries, and depending upon availability will get the regular calorie or high calorie po supplement. Pt drinks whole milk and water with meal.    Breakfast:   Oatmeal and banana.  Lunch:   Yesterday her son brought over baked chicken leg quarter, shrimp pasta, lima beans.  Dinner:   Snack/small portions    Current activity level and/or physical limitations:    Uses a walker at home    Motivation to make changes/anticipated barriers and/or expected adherence:  No barriers identified    Nutrition-Focus Physical Findings:  Pt wearing face covering per COVID19 protocol; pt appears well nourished      Assessment:  Pt and her family members present today attentive and asked relevant questions about foods/beverages/supplements recommended; importance of adequate energy intake-including healthy snacks in daily diet regimen.  All questions answered, and they verbalized  understanding of information.  Pt's daughter asked if it would be acceptable for pt to drink 2 po supplements daily; answered yes.    Nutrition Diagnosis:   None at this time; will monitor weight status at subsequent ALS clinic visits    Recommendations:   Continue two hearty meals daily, with po liquid supplements ad tracey and substantial evening snack.    Strategies Implemented:    High calorie po liquid supplements when available    Consultation Time:20 minutes.  Communicated with referring healthcare provider:  Consult note available in pt's Epic chart per MD discretion  Follow Up:  Pt's daughter provided with dietitian contact number and advised to call with questions or make future appointment if further intervention needed prior to next ALS clinic visit.

## 2022-04-13 NOTE — PROGRESS NOTES
[facility]   Occupational Therapy Evaluation     at ALS [facility]ULTIDISCIPLINARY CLINIC     Date: 4/13/2022    Name: Sophia Hope   MRN: [id number]    Therapy Diagnosis:   Encounter Diagnoses   Name Primary?    Bulbar motor neuron disease Yes    Dysarthria     Spasticity     Nutritional assessment     Abnormal gait     Chronic midline low back pain without sciatica     Spondylosis of lumbar region without myelopathy or radiculopathy     Primary osteoarthritis of both knees     Primary osteoarthritis of left shoulder       Physician: Dr. Jose Drake, Dr. Xi Frank and Dr. Maria G Baltazar  Physician Orders: Ambulatory Referral for Occupational Therapy at ALS Clinic  Plan of Care Expiration: Evaluation Only  Medical Diagnosis: UMND    Initial Clinic Date: 4/13/22  ALS Clinic Number: 1    Time In: 9:32am  Time Out: 10:11am   Total Billable Time: 19.5 minutes     Precautions: Standard and Fall    SUBJECTIVE   Patient reports: Feels herself getting weaker     History of Present Illness: Sophia is a 66 y.o. female that presents to [facility] secondary to dx of ALS. Pt was first diagnosed 2021. Symptom onset was eyelid droopiness in 2020     Medical History:   Past Medical History:   Diagnosis Date    Arthritis     Benign colon polyp     Cataract     Diverticulosis     Glaucoma (increased eye pressure)     History of abnormal Pap smear     History of herpes zoster     Right flank 4/30/12    Hypertension     Hypothyroidism     Osteopenia     Postmenopausal      Surgical History:   Sophia Hope  has a past surgical history that includes Cervix lesion destruction; hysteroscopic ablation; pr dilation/curettage,diagnostic; BTL; Tonsillectomy; PCIOL (Right, 07/25/2018); I-STENT (Right, 07/25/2018); Cataract extraction w/  intraocular lens implant (Left, 12/19/2018); Cataract extraction w/  intraocular lens implant (Right, 07/25/2018); and  "Colonoscopy (N/A, 5/29/2019).  Medications:   Sophia has a current medication list which includes the following prescription(s): allopurinol, atenolol, atenolol, baclofen, calcium carbonate-vitamin d2, combigan, dextran 70-hypromellose, hydrochlorothiazide, irbesartan, latanoprost, levothyroxine, meloxicam, montelukast, pantoprazole, pyridostigmine, and riluzole.  Allergies:   Review of patient's allergies indicates:   Allergen Reactions    Clonidine      Other reaction(s): Rash    Cardizem [diltiazem hcl] Rash     Years ago per patient        Prior or current therapy: n/a    [x] denies current home health services or hospice care    Family Present at time of Eval: Daughter Petty with Son-in-law "D"      Social History: lives with their daughter    Home Access: single level home    DME: rollator and walker      Occupation/hobbies/homemaking: Relaxing and watching TV  Job description includes: n/a  Driving status: no longer    Prior Level of Function: modified independence   Current Level of Function: modified independence      Falls: 2, one recent where she hit her head   Near falls: yes     Pain:  Current 0/10, worst 0/10, best 0/10   Location: n/a    Pt's goals: Education and equipment needs    OBJECTIVE   Patient presents to clinic: transport w/c    Cognitive Exam:  Oriented: Person, Place, Time and Situation  Behaviors: normal, cooperative  Follows Commands/attention: Follows multistep  commands  Communication: dysarthria  Memory: No Deficits noted as determined by 3 word recall after 1 minute and 3 minutes  Safety awareness/insight to disability: aware of diagnosis, treatment, and prognosis  Coping skills/emotional control: Appropriate to situation    Dominant hand:  right     Posture Alignment: [] forward head  [x] forward/rounded shoulders  [] head drop   [] increased thoracic kyphosis  [] sacral sitting  [] unremarkable    Postural examination/scapula alignment: Rounded shoulder and Slouched " posture  Joint integrity: Firm end feeling  Skin integrity: warm/dry    Tone:  [] hypotonic [] hypertonic/spastic     [] Fasciculations   Limbs/muscles affected: [] neck musculature  [] trunk [] right arm  [] left arm  [] right leg  [] left leg      Edema: [x] Moderate LEs  [] pitting [] non-pitting      Coordination:   - fine motor: impaired  - UE coordination: intact  - LE coordination: intact    Gross motor coordination:   ROSALIE:slowed  FTN:slowed    Fine motor coordination: 9 hole peg test  Date 4/13/2022   R hand(secs) 30.84s   L hand (secs) 41.13s     Sensation: Pt. Reports normal sensation       Joint Evaluation AROM 4/13/2022    Left Right   Shoulder flex 0-180 WFL WFL   Shoulder Abd 0-180 WFL WFL   Shoulder ER 0-90 WFL WFL   Shoulder IR 0-90 WFL WFL   Shoulder Extension 0-80 WFL WFL   Shoulder Horizontal adduction 0-90 WFL WFL   Elbow flex/ext 0-150 WFL WFL   Wrist flex 0-80 WFL WFL   Wrist ext 0-70 WFL WFL   Supination 0-80 WFL WFL   Pronation 0-80 WFL WFL   UD WFL WFL   RD WFL WFL     Fist: WFL        Strength 4/13/2022 4/13/2022   **within available ROM** Left Right   Shoulder flex 4/5 4/5   Shoulder abd 4/5 4/5   Shoulder ER 4/5 4/5   Shoulder IR 4/5 4/5   Shoulder Extension 4/5 4/5   Shoulder Horizontal adduction 4/5 4/5   Elbow flex 4/5 4/5   Elbow ext 4/5 4/5   Wrist flex 4/5 4/5   Wrist ext 4/5 4/5   Supination 4/5 4/5   Pronation 4/5 4/5       Sitting balance static: good  Sitting balance dynamic: good  Standing balance static: fair  Standing balance dynamic:  fair    Wheelchair mobility: n/a    ADL's:  Feeding: I  Grooming: I  Hygiene: I  UB Dressing: I  LB Dressing: Mod I, sometimes needs help for socks due to mobility limits  Toileting: I  Bathing: Mod I    IADL's:  Homecare: Mod I  Cooking: Mod I  Laundry: Mod I  Yard work: n/a  Telephone/technology use:  I  Money management: I  Medication management: I     Disabilities of the Arm, Shoulder and Hand (DASH) questionnaire: 53%     Treatment    No  Treatment Provided today.     Education Provided     Education provided re: POC; Purpose of OT services now and in the future to assist with maximizing independence, adaptation/modifications, mobility training, and the ALS disease process.  Sophia verbalized good understanding of education provided. Pt has no cultural, educational or language barriers to learning provided.      Written Home Exercises Provided: none provided today    ASSESSMENT    Sophia is a 66 y.o. female referred to outpatient Occupational Therapy with a medical diagnosis of UMND. Pt presents with increased fatigue and strength/endurance limiattions slowing ADL/IADL completion.       Equipment recommendations:    none     Referral recommendations: Home eval/fall prevention equipment mods   home health occupational therapy        Pt prognosis is Fair.     Plan of care discussed with patient: Yes  Pt's spiritual, cultural and educational needs considered and patient is agreeable to the plan of care.    Anticipated Barriers for therapy: Progressive nature of ALS       Profile and History Assessment of Occupational Performance Level of Clinical Decision Making Complexity Score   Occupational Profile:   Sophia Hope is a 66 y.o. female who lives with their daughter and is currently a . Sophia Hope has difficulty with  dressing  housework/household chores  affecting his/her daily functional abilities. His/her main goal for therapy is education and equipment needs.     Comorbidities:   see chart    Medical and Therapy History Review:   Brief               Performance Deficits    Physical:  Joint Mobility  Muscle Power/Strength  Muscle Endurance  Fine Motor Coordination    Cognitive:  No Deficits    Psychosocial:    No Deficits     Clinical Decision Making:  low    Assessment Process:  Problem-Focused Assessments    Modification/Need for Assistance:  Minimal-Moderate Modifications/Assistance    Intervention Selection:  Limited Treatment  Options       low  Based on PMHX, co morbidities , data from assessments and functional level of assistance required with task and clinical presentation directly impacting function.            Plan   Plan of care Certification: 4/13/2022 Evaluation Only    Pt to continue to follow up with referring provider and ALS clinic at physician recommended intervals.       JAZ Quiroga

## 2022-04-23 ENCOUNTER — PES CALL (OUTPATIENT)
Dept: ADMINISTRATIVE | Facility: CLINIC | Age: 67
End: 2022-04-23
Payer: MEDICARE

## 2022-05-06 ENCOUNTER — TELEPHONE (OUTPATIENT)
Dept: ADMINISTRATIVE | Facility: CLINIC | Age: 67
End: 2022-05-06
Payer: MEDICARE

## 2022-05-09 ENCOUNTER — PATIENT MESSAGE (OUTPATIENT)
Dept: ADMINISTRATIVE | Facility: CLINIC | Age: 67
End: 2022-05-09
Payer: MEDICARE

## 2022-05-09 ENCOUNTER — OFFICE VISIT (OUTPATIENT)
Dept: HOME HEALTH SERVICES | Facility: CLINIC | Age: 67
End: 2022-05-09
Payer: MEDICARE

## 2022-05-09 ENCOUNTER — TELEPHONE (OUTPATIENT)
Dept: ADMINISTRATIVE | Facility: CLINIC | Age: 67
End: 2022-05-09
Payer: MEDICARE

## 2022-05-09 DIAGNOSIS — M85.80 OSTEOPENIA, UNSPECIFIED LOCATION: Chronic | ICD-10-CM

## 2022-05-09 DIAGNOSIS — R26.9 ABNORMALITY OF GAIT AND MOBILITY: ICD-10-CM

## 2022-05-09 DIAGNOSIS — H40.1231 LOW-TENSION GLAUCOMA OF BOTH EYES, MILD STAGE: ICD-10-CM

## 2022-05-09 DIAGNOSIS — G12.20 BULBAR MOTOR NEURON DISEASE: ICD-10-CM

## 2022-05-09 DIAGNOSIS — I10 ESSENTIAL HYPERTENSION: ICD-10-CM

## 2022-05-09 DIAGNOSIS — Z00.00 ENCOUNTER FOR PREVENTIVE HEALTH EXAMINATION: Primary | ICD-10-CM

## 2022-05-09 DIAGNOSIS — I77.1 TORTUOUS AORTA: ICD-10-CM

## 2022-05-09 DIAGNOSIS — R47.81 SLURRING OF SPEECH: ICD-10-CM

## 2022-05-09 DIAGNOSIS — Z99.89 DEPENDENCE ON OTHER ENABLING MACHINES AND DEVICES: ICD-10-CM

## 2022-05-09 DIAGNOSIS — E03.4 HYPOTHYROIDISM DUE TO ACQUIRED ATROPHY OF THYROID: Chronic | ICD-10-CM

## 2022-05-09 PROCEDURE — G0439 PR MEDICARE ANNUAL WELLNESS SUBSEQUENT VISIT: ICD-10-PCS | Mod: 95,,, | Performed by: NURSE PRACTITIONER

## 2022-05-09 PROCEDURE — G0439 PPPS, SUBSEQ VISIT: HCPCS | Mod: 95,,, | Performed by: NURSE PRACTITIONER

## 2022-05-09 NOTE — PROGRESS NOTES
"The patient location is: [place]  The chief complaint leading to consultation is: Health Risk Assessment    Visit type: audiovisual    Face to Face time with patient: 20  35 minutes of total time spent on the encounter, which includes face to face time and non-face to face time preparing to see the patient (eg, review of tests), Obtaining and/or reviewing separately obtained history, Documenting clinical information in the electronic or other health record, Independently interpreting results (not separately reported) and communicating results to the patient/family/caregiver, or Care coordination (not separately reported).         Each patient to whom he or she provides medical services by telemedicine is:  (1) informed of the relationship between the physician and patient and the respective role of any other health care provider with respect to management of the patient; and (2) notified that he or she may decline to receive medical services by telemedicine and may withdraw from such care at any time.    Notes:       Sophia Hope presented for a  Medicare AWV and comprehensive Health Risk Assessment today. The following components were reviewed and updated:    · Medical history  · Family History  · Social history  · Allergies and Current Medications  · Health Risk Assessment  · Health Maintenance  · Care Team         ** See Completed Assessments for Annual Wellness Visit within the encounter summary.**         The following assessments were completed:  · Living Situation  · CAGE  · Depression Screening  · Fall Risk Assessment (MACH 10)  · Hearing Assessment(HHI)  · Cognitive Function Screening  · Nutrition Screening  · ADL Screening  · PAQ Screening      Vitals:    05/09/22 1035   Weight: 83.9 kg (185 lb)   Height: 5' 6" (1.676 m)     Body mass index is 29.86 kg/m².  Physical Exam  Neurological:      Mental Status: She is alert and oriented to person, place, and time.      Comments: Slurred speech           "     Diagnoses and health risks identified today and associated recommendations/orders:    1. Encounter for preventive health examination  Assessments completed. Preventive measures and health maintenance reviewed with patient.    2. Bulbar motor neuron disease  Stable, followed by Neurology.    3. Tortuous aorta  Stable, followed by PCP.    4. Osteopenia, unspecified location  Stable, followed by PCP.    5. Slurring of speech  Stable, followed by PCP and Neurology.    6. Hypothyroidism due to acquired atrophy of thyroid  Stable, followed by PCP.    7. Low-tension glaucoma of both eyes, mild stage  Stable, followed by Optometry.    8. Essential hypertension  Stable, followed by PCP.    9. Abnormality of gait and mobility  Stable, followed by PCP and Neurology.    10. Dependence on other enabling machines and devices  Stable, followed by PCP.    Provided Sophia with a 5-10 year written screening schedule and personal prevention plan. Recommendations were developed using the USPSTF age appropriate recommendations. Education, counseling, and referrals were provided as needed. After Visit Summary printed and given to patient which includes a list of additional screenings\tests needed.    Follow up in about 1 year (around 5/9/2023) for your next annual wellness visit.    Christina Franco NP  I offered to discuss advanced care planning, including how to pick a person who would make decisions for you if you were unable to make them for yourself, called a health care power of , and what kind of decisions you might make such as use of life sustaining treatments such as ventilators and tube feeding when faced with a life limiting illness recorded on a living will that they will need to know. (How you want to be cared for as you near the end of your natural life)     X Patient is interested in learning more about how to make advanced directives.  I provided them paperwork and offered to discuss this with them.

## 2022-05-09 NOTE — PATIENT INSTRUCTIONS
Counseling and Referral of Other Preventative  (Italic type indicates deductible and co-insurance are waived)    Patient Name: Sophia Hope  Today's Date: 5/9/2022    Health Maintenance       Date Due Completion Date    Shingles Vaccine (2 of 3) 04/14/2014 2/17/2014    COVID-19 Vaccine (4 - Booster for Moderna series) 02/28/2022 10/29/2021    Mammogram 12/21/2022 12/21/2021    Override on 12/19/2012: Done    Lipid Panel 03/24/2023 3/24/2022    Override on 8/23/2016: Done    Colorectal Cancer Screening 05/29/2024 5/29/2019    DEXA Scan 06/11/2025 6/11/2020    Override on 8/19/2010: Done    TETANUS VACCINE 03/06/2027 3/6/2017        No orders of the defined types were placed in this encounter.    The following information is provided to all patients.  This information is to help you find resources for any of the problems found today that may be affecting your health:                Living healthy guide: www.Counts include 234 beds at the [facility].[place].gov      Understanding Diabetes: www.diabetes.org      Eating healthy: www.cdc.gov/healthyweight      CDC home safety checklist: www.cdc.gov/steadi/patient.html      Agency on Aging: www.goea.[place].gov      Alcoholics anonymous (AA): www.aa.org      Physical Activity: www.rachel.nih.gov/go3dwje      Tobacco use: www.quitwithusla.org

## 2022-05-10 ENCOUNTER — TELEPHONE (OUTPATIENT)
Dept: NEUROLOGY | Facility: CLINIC | Age: 67
End: 2022-05-10
Payer: MEDICARE

## 2022-05-10 ENCOUNTER — PATIENT MESSAGE (OUTPATIENT)
Dept: NEUROLOGY | Facility: CLINIC | Age: 67
End: 2022-05-10
Payer: MEDICARE

## 2022-05-10 VITALS — WEIGHT: 185 LBS | HEIGHT: 66 IN | BODY MASS INDEX: 29.73 KG/M2

## 2022-05-10 DIAGNOSIS — G12.20 BULBAR MOTOR NEURON DISEASE: Primary | ICD-10-CM

## 2022-05-10 PROBLEM — I77.1 TORTUOUS AORTA: Status: ACTIVE | Noted: 2022-05-10

## 2022-05-10 NOTE — TELEPHONE ENCOUNTER
Benedict,    Home Health orders have been entered for this patient. Please wait until after she has had the FEES scheduled with Outpatient Speech Therapy and completed prior to sending HH Orders.    Thank you,  Jerrell Hernandes RN, BSN, BS  ALS Clinical Care Coordinator  [phone]        Messaged received from [facility] processing CA and Sx order.         Jerrell Hernandes RN, BSN, BS  ALS Clinical Care Coordinator  [phone]

## 2022-05-12 ENCOUNTER — PATIENT MESSAGE (OUTPATIENT)
Dept: FAMILY MEDICINE | Facility: CLINIC | Age: 67
End: 2022-05-12
Payer: MEDICARE

## 2022-05-19 ENCOUNTER — TELEPHONE (OUTPATIENT)
Dept: NEUROLOGY | Facility: CLINIC | Age: 67
End: 2022-05-19
Payer: MEDICARE

## 2022-05-19 NOTE — TELEPHONE ENCOUNTER
Outgoing call to Mrs. Cortes. She indicated that she is open to traveling to [place] or [facility] to obtain FEES.     She is anxious to get Home Health ST and PT started. However, aware will need to complete FEES prior to admit to .           Jerrell Hernandes RN, BSN, BS  ALS Clinical Care Coordinator  [phone]

## 2022-05-23 ENCOUNTER — CLINICAL SUPPORT (OUTPATIENT)
Dept: REHABILITATION | Facility: HOSPITAL | Age: 67
End: 2022-05-23
Payer: MEDICARE

## 2022-05-23 ENCOUNTER — TELEPHONE (OUTPATIENT)
Dept: NEUROLOGY | Facility: CLINIC | Age: 67
End: 2022-05-23
Payer: MEDICARE

## 2022-05-23 ENCOUNTER — SOCIAL WORK (OUTPATIENT)
Dept: NEUROLOGY | Facility: CLINIC | Age: 67
End: 2022-05-23
Payer: MEDICARE

## 2022-05-23 DIAGNOSIS — G12.20 BULBAR MOTOR NEURON DISEASE: ICD-10-CM

## 2022-05-23 DIAGNOSIS — R13.12 OROPHARYNGEAL DYSPHAGIA: ICD-10-CM

## 2022-05-23 PROCEDURE — 92610 EVALUATE SWALLOWING FUNCTION: CPT | Mod: 59,PN | Performed by: SPEECH-LANGUAGE PATHOLOGIST

## 2022-05-23 PROCEDURE — 92612 ENDOSCOPY SWALLOW (FEES) VID: CPT | Mod: PN | Performed by: SPEECH-LANGUAGE PATHOLOGIST

## 2022-05-23 NOTE — TELEPHONE ENCOUNTER
Benedict,     Mrs. Cortes was at her [employer] appt when spoke with her this morning.     Please proceed with Home Health OT and ST.     OT HH for home eval/Fall prevention and equipment suggestions  See below for energy banking         Speech Language Pathology:  Home health speech therapy for training on compensatory speech strategies and use communication apps such as Speech Assistant to supplement speech.      Thank you,  Jerrell Hernandes RN, BSN, BS  ALS Clinical Care Coordinator  [phone]

## 2022-05-23 NOTE — PLAN OF CARE
Outpatient Neurological Rehabilitation  Fiberoptic Endoscopic Evaluation of Swallowing (FEES)    Date: 5/23/2022     Name: Sophia Hope   MRN: [id number]    Therapy Diagnosis:   Encounter Diagnoses   Name Primary?    Bulbar motor neuron disease     Oropharyngeal dysphagia       Physician: Xi Frank,*  Physician Orders: [id number] - AMB REFERRAL/CONSULT TO SPEECH THERAPY; Clinical Swallowing Evaluation; Fiberoptic Endoscopic Evaluation of Swallowing   Order Date: 5/10/22   Medical Diagnosis from Referral: G12.20 (ICD-10-CM) - Bulbar motor neuron disease    Visit #/Visits authorized: 1/ 1  Date of Evaluation:  5/23/2022   Insurance Authorization Period: 5/10/22-12/31/22    Plan of Care Expiration: Evaluation Only    Time In: 8:14 am  Time Out: 9:00 am     Procedure Min.   Flexible fiberoptic endoscopic evaluation of  swallowing by cine or video recording (CPT 68014)  10   Swallow and oral function evaluation (CPT 44826) 36      Precautions:Standard and Fall  Subjective   Date of Onset: Diagnosed with ALS March/April 2022. Symptoms began with left sided weakness, voice changes, and speech and swallowing difficulties.    History of Current Condition:  Sophia Hope was referred by Dr. Frank for a FEES (CPT 32988) to objectively assess anatomy and physiology of the pharyngeal swallow, rule out silent aspiration, determine the safest possible diet, and examine the effectiveness of compensatory strategies. Patient's current nutritional avenue is oral. Patient is currently on The International Dysphagia Diet Standardisation Initiative level 0 (thin) liquid diet consistency; The International Dysphagia Diet Standardisation Initiative level 7 (regular) food diet consistency and presents with some coughing with liquids when drinking too fast. Endorses history of acid reflux; endorses that she takes pantaprazole daily. Solids gives her difficulty at times so she is eating softer foods that are easier to manage   "Patient's niece present.  Past Medical History: Sophia Hope  has a past medical history of Arthritis, Benign colon polyp, Cataract, Diverticulosis, Glaucoma (increased eye pressure), History of abnormal Pap smear, History of herpes zoster, Hypertension, Hypothyroidism, Osteopenia, and Postmenopausal.  Sophia Hope  has a past surgical history that includes Cervix lesion destruction; hysteroscopic ablation; pr dilation/curettage,diagnostic; BTL; Tonsillectomy; PCIOL (Right, 07/25/2018); I-STENT (Right, 07/25/2018); Cataract extraction w/  intraocular lens implant (Left, 12/19/2018); Cataract extraction w/  intraocular lens implant (Right, 07/25/2018); and Colonoscopy (N/A, 5/29/2019).  Medical Hx and Allergies:  Sophia has a current medication list which includes the following prescription(s): allopurinol, atenolol, atenolol, baclofen, calcium carbonate-vitamin d2, combigan, dextran 70-hypromellose, hydrochlorothiazide, irbesartan, latanoprost, levothyroxine, meloxicam, montelukast, pantoprazole, pyridostigmine, and riluzole.   Review of patient's allergies indicates:   Allergen Reactions    Clonidine      Other reaction(s): Rash    Cardizem [diltiazem hcl] Rash     Years ago per patient     Imaging: No Imaging    Pneumonia History: No  Previous MBSS:  Yes - 12/9/21 prior to diagnosis. "Impressions: Pt presents with a safe functional swallow across tested consistencies.  No aspiration or penetration was observed and her swallow strength and coordination was adequate.  Pt reports over the last year she has experienced an overall decline in mobility, a change in her voice, a change in her speech, and a change in her balance.  She is currently waiting for her appointment with neurology.  She will benefit from a full speech and language evaluation with Outpatient Speech Therapy. "  Previous FEES:   No  Prior Therapy:  No prior history of speech therapy. Initiating home health speech therapy following procedure. "   Social History:  Patient lives with her daughter.   Pain:   0/10  Pain Location / Description: None   Patient's Therapy Goals:  identify swallowing function   Objective     Procedure: A flexible fiberoptic nasoendoscope was passed transnasally to view the nasopharynx, oropharynx, hypopharynx, and larynx. 24 swallow trials using 1/2 teaspoon to 3 ounce amounts of real foods of thin liquid, nectar-thick liquid, puree, soft-mechanical, and solid consistencies were video recorded and analyzed using transnasal endoscopy. A clinical swallow evaluation (CPT 15263) was completed in conjunction with the FEES in order to evaluate the oral structures required for functional swallowing.   Scope Time: 9 minutes; 46 seconds     Results revealed:         Clinical Swallow Exam/ Cranial Nerve Exam:  Cranial Nerve 5: Trigeminal Nerve  Motor Jaw Posture at rest: Closed  Mandible Elevation/Depression: WFL  Mandible lateralization: WFL  Abnormal movement: absent Interpretation: within functional limits   Sensory Forehead: WFL  Cheek: WFL  Jaw: WFL  Facial Pain: None noted Interpretation: within functional limits      Cranial Nerve 7: Facial Nerve  Motor Facial Symmetry:  WNL  Wrinkle Forehead: WFL  Close eyes tightly: reduced on right side  Labial Protrusion: reduced  Labial Retraction: reduced  Abnormal movement: absent Interpretation: unable to rule out cranial nerve involvement    Sensory Formal testing not completed. Pt denied any changes in taste        Cranial Nerves IX and X: Glossopharyngeal and Vagus Nerves  Motor Palatal Symmetry (Rest): WNL  Palatal Symmetry: (Movement) WNL  Palatal Elevation (Movement): WNL   Palatal Elevation (Sustained): WNL  Cough: Perceptually weak  Voice Prior to PO intake: hoarse  Resonance: muffled  Abnormal movement: absent Interpretation: within functional limits      Cranial Nerve XII: Hypoglossal Nerve  Motor Tongue at rest: atrophy, fasciculations  Lingual Protrusion: difficulty protruding past  lips  Lingual Protrusion against Resistance: reduced  Lingual Lateralization: reduced range of motion   Abnormal movement: present Interpretation: unable to rule out cranial nerve involvement       Other information:  · Volitional Swallow: Able to palpate laryngeal rise  · Mucosal Quality: No abnormal findings  · Secretion Management: adequate   · Dentition: Good condition for speech and mastication    Nasopharyngoscopic Findings:    Velopharyngeal function: WFL   Anatomic Findings: WNL    Pharyngoscopic and laryngoscopic findings:    Secretions: WNL   Vocal Fold Motion: appeared bilateral    Anatomic Findings: WNL    Oral phase of swallow observed in conjunction with administration of PO trials throughout assessment. Of note, WFL - Patient with adequate/timely lip closure, bolus preparation, and bolus transport/lingual motion across consistencies     Thin Liquids:   Mode and volume administered: 1/2 teaspoon x1, full teaspoon x2   Vallecular Residue: none to trace Bilaterally   Pyriform Sinus Residue: none to trace Bilaterally   Other Residue: None present    Residue independently cleared with at least 1 cleansing swallow   Rosenbeck's 8-Point Penetration-Aspiration Scale: (1) Material does not enter the airway    Nectar/Mildly Thick Liquids:   Mode and volume administered: 1/2 teaspoon x1, full teaspoon x2   Vallecular Residue: none to trace Bilaterally   Pyriform Sinus Residue: none to trace Bilaterally   Other Residue: None present    Residue independently cleared with at least 1 cleansing swallow   Rosenbeck's 8-Point Penetration-Aspiration Scale: (1) Material does not enter the airway    Puree:   Mode and volume administered: 1/2 teaspoon x1, full teaspoon x2    Vallecular Residue: none to mild Bilaterally due to decreased tongue base retraction    Pyriform Sinus Residue: none to trace Bilaterally   Other Residue: diffuse scattered residue throughout the pharynx due to decreased pharyngeal  contraction   Patient independently performed 1-3 cleansing swallows which was successful in reducing residue; thin liquid wash successful in fully eliminating residue    Rosenbeck's 8-Point Penetration-Aspiration Scale: (1) Material does not enter the airway    Mixed Consistency Bolus:   Mode and volume administered: Peaches in liquid x3   Vallecular Residue: trace to mild Bilaterally   Pyriform Sinus Residue: trace bilaterally    Other Residue: None present    Patient independently performed 2-3 cleansing swallows which was successful in reducing residue, residue further reduced given cue to perform hard effortful swallow    Rosenbeck's 8-Point Penetration-Aspiration Scale: (1) Material does not enter the airway    Regular (IDDSI 7):   Mode and volume administered: cookie x2 bites   Vallecular Residue: trace to mild Bilaterally   Pyriform Sinus Residue: trace bilaterally    Other Residue: diffuse scattered residue throughout the pharynx    Patient independently performed at least 2 cleansing swallows which did reduce residue; ultimately, thin liquid wash x1 successful in fully eliminating residue.    Rosenbeck's 8-Point Penetration-Aspiration Scale: (1) Material does not enter the airway    Thin Liquids wash via cup:   Mode and volume administered: self-regulated cup sip x2   Vallecular Residue: none to trace Bilaterally   Pyriform Sinus Residue: none to trace Bilaterally   Other Residue: None present   Independent cleansing swallows x1 successful in clearing residue    Rosenbeck's 8-Point Penetration-Aspiration Scale: (1) Material does not enter the airway    Thin Liquids via cup:   Mode and volume administered: self-regulated cup sip x3, rapid consecutive cup sip x1   Vallecular Residue: none to trace Bilaterally   Pyriform Sinus Residue: none to trace Bilaterally   Other Residue: None present    Independent cleansing swallows x1 successful in clearing residue    Rosenbeck's 8-Point  Penetration-Aspiration Scale: (1) Material does not enter the airway    Thin Liquids via straw:   Mode and volume administered: self-regulated straw sip x3, rapid consecutive straw sip x1   Vallecular Residue: trace bilaterally    Pyriform Sinus Residue: None present   Other Residue: None present    Independent cleansing swallows x1 successful in clearing residue    Rosenbeck's 8-Point Penetration-Aspiration Scale: (1) Material does not enter the airway    Therapeutic interventions to decrease residue:   effortful swallow, alternate solid with liquid wash, small bites/sips, and multiple swallows per bolus.  Strategies were effective in decreasing/eliminating risk for residue.     EAT-10 Score:    Eating Assessment Tool (EAT-10) is a questionnaire given to the patient to  her swallowing function. This assessment is used to document the initial dysphagia severity and monitor the treatment response in persons with a wide array of swallowing disorders.    Key: 0= no problem; 4= severe problem  1. My swallowing problem has caused me to lose weight. - 0  2. My swallowing problem interferes with my ability to go out for meals. - 0  3. Swallowing liquids takes extra effort. - 2  4. Swallowing solids takes extra effort. - 1  5. Swallowing pills takes extra effort. - 1  6. Swallowing is painful. - 0  7. The pleasure of eating is affected by my swallowing. - 1  8. When I swallow food sticks in my throat. - 2  9. I cough when I eat. - 0  10. Swallowing is stressful. - 0    Sophia ranked her swallowing function as a 7/40     Interpretation: Score of greater than 3 is indicative of a swallowing disorder (Zoila et al., 2008); higher scores correlate with increased penetration-aspiration  scale scores, and scores greater than 15 results in the patient being 2.2 times more likely to aspirate (Namita et al., 2015)    References:   NA Cummings., MICKEY Bhatt., CATRACHO Gallegos., RONALD Barone., Zoya, G. N., IVON Salguero., &  JAYE Aleman. (2008). Validity and reliability of the Eating Assessment Tool (EAT-10). Annals of Otology, Rhinology & Laryngology, 117(12), 919-924. https://doi.org/10.1177/[email or web address]  MICKEY Breaux., IRON Hatch., RONALD Jensen., IRON Mathew., & NA Cummings (2015). The ability of the 10-item eating assessment tool (EAT-10) to predict aspiration risk in persons with dysphagia. Annals of Otology, Rhinology & Laryngology, 124(5), 351-354. [email or web address]://[email or web address]/10.1177/[email or web address]     Reflux Severity Index:  The Reflux Severity Index (RSI) was completed to assess the possible presence and/or severity of laryngopharyngeal reflux symptoms and any relationship between this condition and the patient's dysphagia. A score of greater than or equal to 15 may indicate laryngopharyngeal reflux. Score 0-5 (0=no problem, 1=very mild, 2=moderate or slight, 3=moderate, 4=severe, & 5=problem as bad as it can be)  1. Hoarseness or a problem with your voice. 2  2. Clearing your throat. 2  3. Excess throat or mucous post-nasal drip. 1  4. Difficulty swallowing food, liquids or pills. 2  5. Coughing after you ate or after lying down. 0  6. Breathing difficulties or choking episodes. 0  7. Troublesome or annoying cough. 0  8. Sensations of something sticking in your throat. 0  9. Heartburn, chest pain, indigestion, or stomach acid coming up. 0  Patient completed with a result of  7/45    Recommend MBSS: no    Treatment   Treatment Time In: n/a  Treatment Time Out: n/a  Total Treatment Time: n/a  No treatment performed 2/2 time to administer evaluation    Education: Results of study, diet recommendations, and safe swallow strategies/ aspiration precautions discussed. Patient and family members expressed understanding of all discussed.     Home Program: n / a   Assessment   Sophia is a 66 y.o. female referred to outpatient Speech Therapy with a medical diagnosis of Bulbar motor neuron disease. Results of this FEES revealted  that the patient presents with mild dysphagia as defined by the dysphagia outcome and severity scale (adapted for FEES by JANET Ta, [facility], [facility] from O'Yasmani et al 1999). Pharyngoscopic and laryngoscopic findings revealed within functional limits initiation of the swallow, mildly reduced tongue base retraction, within functional limits epiglottic movement, mildly reduced pharyngeal contraction, within functional limits laryngeal vestibule closure, and complete pharyngoesophageal segment opening. Patient independently utilized multiple swallows per bolus and demonstrated ability to utilize hard effortful swallow, alternate solid with liquid wash, and small bites/sips to decrease risk for residue.     Recommend IDDSI level 0 liquid diet consistency; IDDSI level 7 food diet consistency. Pt appears to be at low risk for aspiration related pneumonia in consideration of three pillars of aspiration pneumonia* including good oral health status, overall health/immune status, and lack of aspiration viewed on study today. Demonstrates impairments including limitations as described in the problem list. Positive prognostic factors include patient motivation and family support. Negative prognostic factors include progressive nature of disease. No barriers to therapy identified.  Behavioral swallow rehabilitation is not warranted at this time. Please re-consult should there be a change in status.     *RONALD Ta (2005, March). Pneumonia: Factors Beyond Aspiration. Perspectives in Swallowing and Swallowing Disorders (Dysphagia), 14, 10-16.    Rehab Potential: guarded given progressive nature of disease  Pt's spiritual, cultural and educational needs considered and pt agreeable to plan of care and goals.      Plan   Plan of Care Certification: evaluation only     Recommended Treatment Plan: Behavioral swallow rehabilitation is not warranted at this time.      Other Recommendations:   - Aggressive oral care at  least twice daily (morning and bedtime) is strongly recommended to reduce bacteria on oropharyngeal surfaces which may increase the risk of nosocomial infections, including pneumonia.   - Monitor for any signs/symptoms of aspiration (such as wet/gurgly voice that does not clear with coughing, inability to make any voice sounds, any persistent coughing with oral intake, otherwise unexplained fever, unexplained increased or new difficulty or discomfort breathing, unexplained increase in sleepiness/lethargy/significant fatigue, unexplained increase or new onset confusion or change in cognitive functioning, or any other unexplained change in health or well-being that could be related to swallowing).  - Risk Management: use good oral hygiene , sit upright for all PO intake, increase physical mobility as tolerated, behavioral reflux precautions, alternate bites and sips, small bites and sips, multiple swallows per bolus, allow extra time for meals and remain upright for at least 1.5 hours following any PO intake  -Specialist Referrals:   -Ancillary Tests: n / a   -Follow-up exam: Follow up swallow study is not indicated at this time. and Follow up instrumental assessment of the swallow should be completed at the recommendation of the treating clinician.    Therapist's Name:   LENARD Quezada, CCC-SLP  Speech Language Pathologist     Date: 5/23/2022

## 2022-05-23 NOTE — PROGRESS NOTES
Please see results of Fiberoptic Endoscopic Evaluation of Swallowing in plan of care.    LENARD Quezada, CCC-SLP  Speech Language Pathologist   5/23/2022

## 2022-05-23 NOTE — PROGRESS NOTES
MICHELLE faxed over HH referral and neurologist note to [facility] in [place] via [facility] fax.

## 2022-05-25 PROBLEM — R13.12 OROPHARYNGEAL DYSPHAGIA: Status: ACTIVE | Noted: 2022-05-25

## 2022-06-07 RX ORDER — HYDROCHLOROTHIAZIDE 25 MG/1
TABLET ORAL
Qty: 30 TABLET | Refills: 5 | Status: SHIPPED | OUTPATIENT
Start: 2022-06-07 | End: 2022-11-09 | Stop reason: ALTCHOICE

## 2022-06-13 ENCOUNTER — PATIENT MESSAGE (OUTPATIENT)
Dept: FAMILY MEDICINE | Facility: CLINIC | Age: 67
End: 2022-06-13
Payer: MEDICARE

## 2022-06-14 ENCOUNTER — EXTERNAL HOME HEALTH (OUTPATIENT)
Dept: HOME HEALTH SERVICES | Facility: HOSPITAL | Age: 67
End: 2022-06-14
Payer: MEDICARE

## 2022-06-18 DIAGNOSIS — R00.2 PALPITATIONS: ICD-10-CM

## 2022-06-18 DIAGNOSIS — I10 ESSENTIAL HYPERTENSION: Chronic | ICD-10-CM

## 2022-06-18 RX ORDER — ATENOLOL 50 MG/1
TABLET ORAL
Qty: 90 TABLET | Refills: 3 | Status: SHIPPED | OUTPATIENT
Start: 2022-06-18

## 2022-06-18 RX ORDER — ATENOLOL 100 MG/1
TABLET ORAL
Qty: 90 TABLET | Refills: 3 | Status: SHIPPED | OUTPATIENT
Start: 2022-06-18

## 2022-06-18 NOTE — TELEPHONE ENCOUNTER
No new care gaps identified.  [facility] Embedded Care Gaps. Reference number: [id number]. 6/18/2022   8:08:33 AM CDT

## 2022-06-27 ENCOUNTER — TELEPHONE (OUTPATIENT)
Dept: NEUROLOGY | Facility: CLINIC | Age: 67
End: 2022-06-27
Payer: MEDICARE

## 2022-06-27 NOTE — TELEPHONE ENCOUNTER
----- Message from Matilde Ronquillo sent at 6/27/2022  9:48 AM CDT -----  Contact: Nurse Crocker @ [facility] Dirct# [phone]  Nurse Lizzette from [facility] is calling in regards to her wanting to put in an order for physical therapy for the patient please.

## 2022-07-05 DIAGNOSIS — G12.20 BULBAR MOTOR NEURON DISEASE: Primary | ICD-10-CM

## 2022-07-05 DIAGNOSIS — Z65.8 IMPAIRED ACCEPTANCE OF HEALTH STATUS: ICD-10-CM

## 2022-07-05 NOTE — PROGRESS NOTES
[facility]   Occupational Therapy Screen    at [facility]     Date: 7/6/2022    Name: Sophia Hope   MRN: [id number]    Therapy Diagnosis: Impaired mobility and ADL's  Physician: Dr. Jose Drake and Dr. Xi Frank  Physician Orders: Ambulatory Referral for Occupational Therapy at ALS Clinic  Plan of Care Expiration: Screening Only  Medical Diagnosis: UMND    Initial Clinic Date: 4/13/22  ALS Clinic Number:[id number]    Time In:1016  Time Out: 1030  Total Billable Time: 24 minutes     Precautions: Standard and Fall    SUBJECTIVE   Patient reports: Feels herself getting weaker     History of Present Illness: Sophia is a 66 y.o. female that presents to [facility] secondary to dx of ALS. Pt was first diagnosed 2021. Symptom onset was eyelid droopiness in 2020     Medical History:   Past Medical History:   Diagnosis Date    Arthritis     Benign colon polyp     Cataract     Diverticulosis     Glaucoma (increased eye pressure)     History of abnormal Pap smear     History of herpes zoster     Right flank 4/30/12    Hypertension     Hypothyroidism     Osteopenia     Postmenopausal      Surgical History:   Sophia Hope  has a past surgical history that includes Cervix lesion destruction; hysteroscopic ablation; pr dilation/curettage,diagnostic; BTL; Tonsillectomy; PCIOL (Right, 07/25/2018); I-STENT (Right, 07/25/2018); Cataract extraction w/  intraocular lens implant (Left, 12/19/2018); Cataract extraction w/  intraocular lens implant (Right, 07/25/2018); and Colonoscopy (N/A, 5/29/2019).  Medications:   Sophia has a current medication list which includes the following prescription(s): allopurinol, atenolol, atenolol, baclofen, calcium carbonate-vitamin d2, combigan, dextran 70-hypromellose, hydrochlorothiazide, irbesartan, latanoprost, levothyroxine, meloxicam, montelukast, pantoprazole, pyridostigmine, and riluzole.  Allergies:    Review of patient's allergies indicates:   Allergen Reactions    Clonidine      Other reaction(s): Rash    Cardizem [diltiazem hcl] Rash     Years ago per patient        Prior or current therapy: n/a    [x] denies current home health services or hospice care    Family Present at time of Eval: Grandchildren    Social History: lives with their daughter    Home Access: single level home    DME: rollator and walker      Occupation/hobbies/homemaking: Relaxing and watching TV  Job description includes: n/a  Driving status: no longer    Prior Level of Function: modified independence   Current Level of Function: modified independence      Falls: 2, one recent where she hit her head   Near falls: yes     Pain:  Current 0/10, worst 0/10, best 0/10   Location: n/a    Pt's goals: Education and equipment needs    OBJECTIVE   Patient presents to clinic: manual w/c    Cognitive Exam:  Oriented: Person, Place, Time and Situation  Behaviors: normal, cooperative  Follows Commands/attention: Follows multistep  commands  Communication: dysarthria  Memory: No Deficits noted   Safety awareness/insight to disability: aware of diagnosis, treatment, and prognosis  Coping skills/emotional control: Appropriate to situation    Dominant hand:  right     Posture Alignment: [] forward head  [x] forward/rounded shoulders  [] head drop   [] increased thoracic kyphosis  [] sacral sitting  [] unremarkable    Postural examination/scapula alignment: Rounded shoulder and Slouched posture  Joint integrity: Firm end feeling  Skin integrity: warm/dry    Tone:  [] hypotonic [] hypertonic/spastic     [] Fasciculations   Limbs/muscles affected: [] neck musculature  [] trunk [] right arm  [] left arm  [] right leg  [] left leg      Edema: [x] Moderate LEs  [] pitting [x] non-pitting        Gross motor coordination:   ROSALIE:slowed  FTN:slowed    Fine motor coordination: 9 hole peg test, not reassessed this date.   Date 4/13/2022   R hand(secs) 30.84s   L  hand (secs) 41.13s     Sensation: Pt. Reports normal sensation       Joint Evaluation AROM 4/13/2022 and 7/6/2022    Left Right   Shoulder flex 0-180 WFL WFL   Shoulder Abd 0-180 WFL WFL   Shoulder ER 0-90 WFL WFL   Shoulder IR 0-90 WFL WFL   Shoulder Extension 0-80 WFL WFL   Shoulder Horizontal adduction 0-90 WFL WFL   Elbow flex/ext 0-150 WFL WFL   Wrist flex 0-80 WFL WFL   Wrist ext 0-70 WFL WFL   Supination 0-80 WFL WFL   Pronation 0-80 WFL WFL   UD WFL WFL   RD WFL WFL     Fist: WFL        Strength 7/6/2022 7/6/2022    **within available ROM** Left Right   Shoulder flex 4/5 4/5   Shoulder abd 4/5 4/5   Shoulder ER 4/5 4/5   Shoulder IR 4/5 4/5   Shoulder Extension 4/5 4/5   Shoulder Horizontal adduction 4/5 4/5   Elbow flex 4/5 4/5   Elbow ext 4/5 4/5   Wrist flex 4/5 4/5   Wrist ext 4/5 4/5   Supination 4/5 4/5   Pronation 4/5 4/5       Sitting balance static: good  Sitting balance dynamic: good  Standing balance static: fair  Standing balance dynamic:  fair    Wheelchair mobility: not applicable, uses walker    ADL's:  Feeding: I  Grooming: I  Hygiene: I  UB Dressing: I  LB Dressing: Mod I, sometimes needs help for socks due to mobility limits  Toileting: I  Bathing: Supervised for safety    IADL's:  Homecare: no longer doing  Cooking: Can obtain a snack or drink, family doing cooking  Laundry: Folding clothes  Yard work: n/a  Telephone/technology use:  I  Money management: I  Medication management: I     Disabilities of the Arm, Shoulder and Hand (DASH) questionnaire: 53% on last visit.      Treatment    No Treatment Provided today.     Education Provided     Education provided re: POC; Purpose of OT services now and in the future to assist with maximizing independence, adaptation/modifications, mobility training, and the ALS disease process.  Sophia verbalized good understanding of education provided. Pt has no cultural, educational or language barriers to learning provided.      Written Home Exercises  Provided: none provided today   Energy conservation and work simplification education sent     ASSESSMENT    Sophia is a 66 y.o. female referred to outpatient Occupational Therapy with a medical diagnosis of UMND. Pt presents with increased fatigue and strength/endurance limiations slowing ADL/IADL completion.       Equipment recommendations:    none     Referral recommendations: Home eval/fall prevention equipment mods   none        Pt prognosis is Fair.     Plan of care discussed with patient: Yes  Pt's spiritual, cultural and educational needs considered and patient is agreeable to the plan of care.    Anticipated Barriers for therapy: Progressive nature of ALS     Plan   Plan of care Certification: 7/6/2022 Screen Only    Pt to continue to follow up with referring provider and ALS clinic at physician recommended intervals.       Rhonda Lazo, OT

## 2022-07-05 NOTE — PROGRESS NOTES
Patient and Family Members Demographics:  Patient is a 66 year old female present for today's ALS clinic.    Patient accompanied by their grandchildren.   Patient resides in [place], [place]      (CONTACT INFO)        [email or web address]  Nehal phone number [phone]     SUPPORTS:   Lives with daughter and daughter's family.      SW provided handout with following information on ALS family supports.      Family Caregiver  Primarily, caregiving is provided by family members. Family caregivers provide care day and night, over weekends and on demand. Caregiving can include personal care, assistance with mobility in the home, transportation, housework, and grocery shopping, along with looking after other family members needs. Caregivers are often employed outside the home and may be the primary source of household income which adds even more demands, responsibilities and stress. The family caregiver spouse, partner, adult child, parent, brother, sister -- needs acknowledgement and support in the process of starting and maintaining the care-providing relationship.     Community [employer] Waiver (CCW)- information attached  There is a program under the [place] Long Term Medicaid Benefit called Community Choices Waiver (CCW). People with ALS will generally qualify medically, but you also have to qualify financially. They will use ( Medicaid ) criteria to be eligible for this program. This program has some benefits as listed on the handout and would allow for a person to sit with person with ALS. The sitter will be limited to what they are allowed to do and they usually dont handle medications, machines, or administer tube feeding. They would be able to assist with dressing, bathing, transfers, and other small tasks.     Home Health These are specialized healthcare-related services that are provided by medical professionals in the comfort of your own home. Home health will be ordered by your doctor as  needs arise. These services cover a wide range of medical, social and therapeutic treatments, as well as activities associated with daily living. These are time limited and will generally last a few hours a day for a few weeks. This is not intended to be 24-hour daily care. This is covered by private insurance, Medicare, and Medicaid.     Private sitters  This would be an out-of-pocket expense to the patient and family. This is not covered by private insurance or Medicare. The cost typically starts around $20-$25 per hour. The general process would be that you can reach out to a perspective agency and let them know what your needs are. They will do a more needs-based evaluation and make the determination. These agencies may provide a Sitter, Certified Nursing Assistant, Licensed Practical Nurse or Registered Nurse.     [employer] or LISA     they are not yet linked to team [place].    Patient and family spoke to ALSA representative at last clinic.      INSURANCE COVERAGE:   Patient confirmed the following insurance coverage:   Medicare A and B and BCBS - part of a state plan.   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      [place] benefits:   Patient confirmed they are not a .      Waiver Services:   [place]: MICHELLE provided handout out [employer] waiver for [place]   What is the Community [employer] Waiver Program?  The Community Choices Waiver program provides services in the home and in the community to elders or adults with disabilities who qualify.  [place] [facility] in Long Term Care at [phone]        MICHELLE TO FOLLOW UP:    emailed daughter [email or web address] and asked to call CCW number.      Benedict Casey LCSW

## 2022-07-06 ENCOUNTER — OFFICE VISIT (OUTPATIENT)
Dept: NEUROLOGY | Facility: CLINIC | Age: 67
End: 2022-07-06
Payer: MEDICARE

## 2022-07-06 ENCOUNTER — LAB VISIT (OUTPATIENT)
Dept: LAB | Facility: HOSPITAL | Age: 67
End: 2022-07-06
Attending: PSYCHIATRY & NEUROLOGY
Payer: MEDICARE

## 2022-07-06 VITALS — HEIGHT: 66 IN | OXYGEN SATURATION: 98 % | BODY MASS INDEX: 29.09 KG/M2 | WEIGHT: 181 LBS

## 2022-07-06 DIAGNOSIS — Z00.8 NUTRITIONAL ASSESSMENT: ICD-10-CM

## 2022-07-06 DIAGNOSIS — R47.1 DYSARTHRIA: ICD-10-CM

## 2022-07-06 DIAGNOSIS — H02.403 PTOSIS OF BOTH EYELIDS: ICD-10-CM

## 2022-07-06 DIAGNOSIS — R26.89 IMPAIRED GAIT AND MOBILITY: ICD-10-CM

## 2022-07-06 DIAGNOSIS — R13.12 OROPHARYNGEAL DYSPHAGIA: ICD-10-CM

## 2022-07-06 DIAGNOSIS — R25.2 SPASTICITY: ICD-10-CM

## 2022-07-06 DIAGNOSIS — G89.29 CHRONIC MIDLINE LOW BACK PAIN WITHOUT SCIATICA: ICD-10-CM

## 2022-07-06 DIAGNOSIS — M17.0 PRIMARY OSTEOARTHRITIS OF BOTH KNEES: ICD-10-CM

## 2022-07-06 DIAGNOSIS — G12.21 ALS (AMYOTROPHIC LATERAL SCLEROSIS): ICD-10-CM

## 2022-07-06 DIAGNOSIS — M47.816 SPONDYLOSIS OF LUMBAR REGION WITHOUT MYELOPATHY OR RADICULOPATHY: ICD-10-CM

## 2022-07-06 DIAGNOSIS — M54.50 CHRONIC MIDLINE LOW BACK PAIN WITHOUT SCIATICA: ICD-10-CM

## 2022-07-06 DIAGNOSIS — G12.21 ALS (AMYOTROPHIC LATERAL SCLEROSIS): Primary | ICD-10-CM

## 2022-07-06 DIAGNOSIS — M19.012 PRIMARY OSTEOARTHRITIS OF LEFT SHOULDER: ICD-10-CM

## 2022-07-06 DIAGNOSIS — R26.9 GAIT DISORDER: ICD-10-CM

## 2022-07-06 LAB
ALBUMIN SERPL BCP-MCNC: 3.5 G/DL (ref 3.5–5.2)
ALP SERPL-CCNC: 58 U/L (ref 55–135)
ALT SERPL W/O P-5'-P-CCNC: 14 U/L (ref 10–44)
ANION GAP SERPL CALC-SCNC: 10 MMOL/L (ref 8–16)
AST SERPL-CCNC: 22 U/L (ref 10–40)
BASOPHILS # BLD AUTO: 0.04 K/UL (ref 0–0.2)
BASOPHILS NFR BLD: 0.7 % (ref 0–1.9)
BILIRUB SERPL-MCNC: 0.9 MG/DL (ref 0.1–1)
BUN SERPL-MCNC: 16 MG/DL (ref 8–23)
CALCIUM SERPL-MCNC: 9.9 MG/DL (ref 8.7–10.5)
CHLORIDE SERPL-SCNC: 103 MMOL/L (ref 95–110)
CO2 SERPL-SCNC: 26 MMOL/L (ref 23–29)
CREAT SERPL-MCNC: 1 MG/DL (ref 0.5–1.4)
DIFFERENTIAL METHOD: ABNORMAL
EOSINOPHIL # BLD AUTO: 0.1 K/UL (ref 0–0.5)
EOSINOPHIL NFR BLD: 2.3 % (ref 0–8)
ERYTHROCYTE [DISTWIDTH] IN BLOOD BY AUTOMATED COUNT: 13.1 % (ref 11.5–14.5)
EST. GFR  (AFRICAN AMERICAN): >60 ML/MIN/1.73 M^2
EST. GFR  (NON AFRICAN AMERICAN): 58 ML/MIN/1.73 M^2
GLUCOSE SERPL-MCNC: 93 MG/DL (ref 70–110)
HCT VFR BLD AUTO: 37.2 % (ref 37–48.5)
HGB BLD-MCNC: 11.8 G/DL (ref 12–16)
IMM GRANULOCYTES # BLD AUTO: 0.01 K/UL (ref 0–0.04)
IMM GRANULOCYTES NFR BLD AUTO: 0.2 % (ref 0–0.5)
LYMPHOCYTES # BLD AUTO: 1.7 K/UL (ref 1–4.8)
LYMPHOCYTES NFR BLD: 30.9 % (ref 18–48)
MCH RBC QN AUTO: 32.8 PG (ref 27–31)
MCHC RBC AUTO-ENTMCNC: 31.7 G/DL (ref 32–36)
MCV RBC AUTO: 103 FL (ref 82–98)
MONOCYTES # BLD AUTO: 0.5 K/UL (ref 0.3–1)
MONOCYTES NFR BLD: 8.3 % (ref 4–15)
NEUTROPHILS # BLD AUTO: 3.2 K/UL (ref 1.8–7.7)
NEUTROPHILS NFR BLD: 57.6 % (ref 38–73)
NRBC BLD-RTO: 0 /100 WBC
PLATELET # BLD AUTO: 207 K/UL (ref 150–450)
PMV BLD AUTO: 11 FL (ref 9.2–12.9)
POTASSIUM SERPL-SCNC: 3.8 MMOL/L (ref 3.5–5.1)
PROT SERPL-MCNC: 7 G/DL (ref 6–8.4)
RBC # BLD AUTO: 3.6 M/UL (ref 4–5.4)
SODIUM SERPL-SCNC: 139 MMOL/L (ref 136–145)
WBC # BLD AUTO: 5.57 K/UL (ref 3.9–12.7)

## 2022-07-06 PROCEDURE — 99214 OFFICE O/P EST MOD 30 MIN: CPT | Mod: S$PBB,,, | Performed by: PSYCHIATRY & NEUROLOGY

## 2022-07-06 PROCEDURE — 99999 PR PBB SHADOW E&M-EST. PATIENT-LVL III: ICD-10-PCS | Mod: PBBFAC,,,

## 2022-07-06 PROCEDURE — 92522 EVALUATE SPEECH PRODUCTION: CPT | Mod: PN

## 2022-07-06 PROCEDURE — 99214 OFFICE O/P EST MOD 30 MIN: CPT | Mod: S$PBB,,, | Performed by: PHYSICAL MEDICINE & REHABILITATION

## 2022-07-06 PROCEDURE — 99213 OFFICE O/P EST LOW 20 MIN: CPT | Mod: PBBFAC

## 2022-07-06 PROCEDURE — 99999 PR PBB SHADOW E&M-EST. PATIENT-LVL III: CPT | Mod: PBBFAC,,,

## 2022-07-06 PROCEDURE — 80053 COMPREHEN METABOLIC PANEL: CPT | Performed by: PSYCHIATRY & NEUROLOGY

## 2022-07-06 PROCEDURE — 99214 PR OFFICE/OUTPT VISIT, EST, LEVL IV, 30-39 MIN: ICD-10-PCS | Mod: S$PBB,,, | Performed by: PHYSICAL MEDICINE & REHABILITATION

## 2022-07-06 PROCEDURE — 92610 EVALUATE SWALLOWING FUNCTION: CPT | Mod: PN

## 2022-07-06 PROCEDURE — 85025 COMPLETE CBC W/AUTO DIFF WBC: CPT | Performed by: PSYCHIATRY & NEUROLOGY

## 2022-07-06 PROCEDURE — 36415 COLL VENOUS BLD VENIPUNCTURE: CPT | Performed by: PSYCHIATRY & NEUROLOGY

## 2022-07-06 PROCEDURE — 99214 PR OFFICE/OUTPT VISIT, EST, LEVL IV, 30-39 MIN: ICD-10-PCS | Mod: S$PBB,,, | Performed by: PSYCHIATRY & NEUROLOGY

## 2022-07-06 RX ORDER — DICLOFENAC SODIUM 10 MG/G
2 GEL TOPICAL 3 TIMES DAILY PRN
COMMUNITY
Start: 2022-07-06 | End: 2022-07-06

## 2022-07-06 NOTE — PROGRESS NOTES
NEUROLOGY  [facility]  [address] [place]  [address]  [phone] (office) / [phone] (fax)    Patient Name:  Sophia Hope  :  1955  MR #:  [id number]  Acct #:  [id number]    Date of Clinic Follow Up: 2022  Name of Neurologist: Xi Frank D.O, ABPN, AOBNP, ABEM    Other Physicians:  Tamela Lance MD (Primary Care Physician); No ref. provider found (Referring)    Subjective:    Sophia Hope is a 66 y.o. female here today for follow up of motor neuron disease.    Here with: Her three grandkids (Brooks, Jordan, and Elena)    Sophia Hope is a 66 y.o. female referred here today to establish care for motor neuron disease. She was diagnosed by Dr. Frank bulbar onset ALS following development of extremity weakness and dysarthria. Her symptoms began with changes in speech beginning around May/2021.       Strength:  Decreased strength, is having eyelid droop and having some difficulty with vision (questions regarding her bleph procedures)    Dysarthria:  Denies any problems; denies coughing or choking when eating food or drinking. Discussion today with Dr. Salamanca regarding feeding tube placement    Dysphagia: Continued decline in ability to speak, intelligible with repetition; she uses text to speech fairly frequently     Sialorrhea: Denies    Constipation: Denies     Sleep:  Denies any problems is able to sleep well through the night     Gait:  Using a walker to assist with gait, can walk approx 30 feet with assistance of walker, unable to climb stairs without assistance    Falls:  Denies    Breathing: Denies any shortness of breath even with activity, able to lay flat on her back and breath comfortably     Bladder: Denies    Pain:  Denies    Mood:  States she is stable and denies any depression     PBA:  Denies    Memory: Stable no concerns       Treatment to date:     Riluzole 50mg BID  Radicava- still deciding       Past Medical, Surgical, Family & Social  "History:   Reviewed and updated.    Home Medications:     Current Outpatient Medications:     allopurinoL (ZYLOPRIM) 100 MG tablet, TAKE 1 TABLET(100 MG) BY MOUTH EVERY DAY, Disp: 90 tablet, Rfl: 1    atenoloL (TENORMIN) 100 MG tablet, TAKE 1 TABLET(100 MG) BY MOUTH EVERY DAY, Disp: 90 tablet, Rfl: 3    atenoloL (TENORMIN) 50 MG tablet, TAKE 1 TABLET(50 MG) BY MOUTH EVERY DAY, Disp: 90 tablet, Rfl: 3    baclofen (LIORESAL) 10 MG tablet, Take 1 tablet (10 mg total) by mouth nightly as needed. For back pain, Disp: 30 tablet, Rfl: 2    calcium carbonate-vitamin D2 600 mg calcium- 200 unit Cap, Take 1 tablet by mouth Twice daily., Disp: , Rfl:     COMBIGAN 0.2-0.5 % Drop, INSTILL 1 DROP IN BOTH EYES TWICE DAILY, Disp: 10 mL, Rfl: 12    dextran 70-hypromellose (TEARS) ophthalmic solution, Place 1 drop into both eyes. BOTH EYES PRN , Disp: , Rfl:     hydroCHLOROthiazide (HYDRODIURIL) 25 MG tablet, TAKE 1 TABLET(25 MG) BY MOUTH EVERY DAY, Disp: 30 tablet, Rfl: 5    irbesartan (AVAPRO) 300 MG tablet, TAKE 1 TABLET(300 MG) BY MOUTH EVERY DAY, Disp: 90 tablet, Rfl: 3    latanoprost 0.005 % ophthalmic solution, INSTILL 1 DROP IN BOTH EYES EVERY DAY AT BEDTIME, Disp: 2.5 mL, Rfl: 12    levothyroxine (SYNTHROID) 88 MCG tablet, Take 1 tablet (88 mcg total) by mouth every morning., Disp: 90 tablet, Rfl: 3    meloxicam (MOBIC) 15 MG tablet, TAKE 1 TABLET(15 MG) BY MOUTH DAILY AS NEEDED, Disp: 90 tablet, Rfl: 0    montelukast (SINGULAIR) 10 mg tablet, TAKE 1 TABLET(10 MG) BY MOUTH EVERY DAY, Disp: 90 tablet, Rfl: 1    pantoprazole (PROTONIX) 40 MG tablet, TAKE 1 TABLET(40 MG) BY MOUTH EVERY DAY, Disp: 30 tablet, Rfl: 11    riluzole 50 mg Tab tablet, Take 1 tablet (50 mg total) by mouth every 12 (twelve) hours., Disp: 60 tablet, Rfl: 11      Objective:    Vitals:    07/06/22 0941 07/06/22 1017   SpO2:  98%   Weight: 82.1 kg (181 lb)    Height: 5' 6" (1.676 m)      ALS Physical Exam PBA Speech Facial Strength Tongue " Strength Tongue Appear Jaw Jerk Limb Fasciculations   7/6/2022 No moderate normal mild atrophied Yes Absent      ALS Physical Exam (Continued) Neck Flex MMT Neck Ext MMT Shd Abd R MMT Shd Abd L MMT Wrist Ext R MMT Wrist Ext L MMT Hip Flex R MMT   7/6/2022 5 5 5 5 5 5 5      ALS Physical Exam (Continued) Hip Flex L MMT Knee Ext R MMT Knee Ext L MMT Foot DF R MMT Foot DF L MMT UMN Signs Arms Type UMN Signs Legs   7/6/2022 5 5 5 - 5 hyper-reflexia, increased tone Yes      ALS Physical Exam (Continued) UMN Signs Legs Type   7/6/2022 hyper-reflexia, increased tone       ALSFRS Score: 36  FRS-6 Score: 17         GENERAL:  General appearance: Well, non-toxic appearing.  No apparent distress.  Respiratory:  Normal.  Extremities: Normal.    MENTAL STATUS:  Alertness, attention span & concentration: normal.  Language: normal.  Orientation to self, place & time:  normal.  Memory, recent & remote: normal.  Fund of knowledge: normal.    SPEECH:  Slurred and needs to repeat for comprehension of words  Follows complex commands.    CRANIAL NERVES:  Cranial Nerves II-XII were examined.  II - Visual fields: normal.  III, IV, VI: PERRL, EOMI,+ Bilateral Ptosis, No nystagmus.  V - Facial sensation: normal.  VII - Face symmetry & mobility: normal.  VIII - Hearing: normal.  IX, X - Palate: mobile & midline.  XI - Shoulder shrug: normal.  XII - Tongue protrusion: normal.    GROSS MOTOR:  Gait & station: Decreased ability to have gait, limited leg function 2/2 weakness  Tone: Increased bilateral arms and legs   Abnormal movements: none.  Finger-nose & Heel-knee-shin: normal.  Rapid alternating movements & drift: normal.    MUSCLE STRENGTH:     Fascics Atrophy RIGHT    LEFT Atrophy Fascics     4+ Neck Ext. 4+       5 Neck Flex 5       5 Deltoids 5       5 Sh.Ext.Rot. 5       5 Sh.Int.Rot. 5       5 Biceps 5       5 Triceps 5       5 Forearm.Pr. 5       5 Wrist Ext. 5       5 Wrist Flex 5       5 Finger Ext. 5       5 Finger Flex 5       5  FPL 5       5 Inteross. 5                         3 Iliopsoas 3       5 Hip Abduct 5       5 Hip Adduct 5       5 Quads 5       5 Hams 5       5 Dorsiflex 5       5 Plantar Flex 5       5 Ankle Jared 5       5 Ankle Invert 5       3 Toe Ext. 3       3 Toe Flex 3                           REFLEXES:    RIGHT Reflex   LEFT   3+ Biceps 3+   3+ Brachiorad. 3+   3+ Triceps 3+   + Pectoralis +   + Jaw Jerk +   - Knowles's -        3+ Patellar 3+   3+ Ankle 3+   + Suprapatellar +        2 beats Clonus 2 beats   Down PLANTAR Down       SENSORY:  Light touch: Normal throughout.  Sharp touch: Normal throughout.  Vibration: Normal throughout.    Diagnostic Data Reviewed:   MRI brain 1/3/22:  No acute abnormality  Moderate degree of scattered nonspecific T2/FLAIR hyperintense signal throughout the supratentorial white matter which most likely represents sequela of chronic microvascular ischemic disease.No acute abnormality     EMG 12/17/21:  This is an abnormal EMG of the right upper and lower extremities.  Due to the concerns listed in the history above, additional testing was added to include repetitive nerve stimulation as well as EMG examination of the tongue, paraspinal muscles, and left lower extremity.  The findings are as follows:  1.  Slow repetitive stimulation of the median and facial nerves at rest and following exercise is normal.  There is no evidence, by repetitive stimulations, of a neuromuscular junction defect of the postsynaptic type, as seen with myasthenia gravis.     2. There are focal changes seen in the left lower extremity most consistent with a left L5 radiculopathy, clinical correlation is recommended.   3. The chronic, borderline changes noted in the tongue muscles are consistent with bilateral hypoglossal neuropathy.  This neuropathy can be a symptom of progressive bulbar palsy.  This study does not meet el Escorial criteria for motor neuron disease.  4. The low amplitude noted in the right extensor  digitorum brevis muscle appears to be a normal variant of disuse atrophy.  There are no associated neurogenic changes.  There is no evidence of a peripheral neuropathy, plexopathy, or radiculopathy on this study.  In addition, there is no evidence of myopathy or neuromuscular junction disorder on this study.     Swallow study 12/9/21:  Pt presents with a safe functional swallow across tested consistencies.  No aspiration or penetration was observed and her swallow strength and coordination was adequate      Assessment and Plan    Sophia Hope is a 66 y.o.female here for follow up of 3 months.  I and the members of the multidisciplinary team have assessed Sophia Hope and have made the following recommendations:    Problem List Items Addressed This Visit        Neuro    ALS (amyotrophic lateral sclerosis) - Primary    Overview     Symptom developed with changes in speech in May/June 2021 followed by extremity weakness and dysarthria in late 2021    EMG 12/17/21 showed abnormalities in upper and lower extremities.           Current Assessment & Plan     Discussed with the patient the oral combination of Radicava ORS and Riluzole and patient is interested so will place orders for the patient to receive both of these medications    Discussed with the patient that she has more upper motor neuron signs            Relevant Orders    CBC auto differential (Completed)    Comprehensive metabolic panel (Completed)    Ambulatory consult to Speech Therapy    Dysarthria    Current Assessment & Plan     Voice banking was not finished.  This patient has progressive dysarthria due to ALS. Communication is becoming more problematic. Will require an augmented communication device in order to continue communicating with family and caregivers.               Relevant Orders    Ambulatory consult to Speech Therapy       Ophtho    Ptosis of both eyelids    Current Assessment & Plan     She is interested in blepharoplasty.  Will need  to speak with pulmonology regarding safety for surgery.              GI    Oropharyngeal dysphagia       Other    Spasticity    Impaired gait and mobility    Current Assessment & Plan     She has completed a PWC evaluation but she has not proceeded with obtaining a chair.  She is still contemplating this big decision. She is a candidate for PWC due to declining strength and balance.               Other Visit Diagnoses     Chronic midline low back pain without sciatica        Spondylosis of lumbar region without myelopathy or radiculopathy        Primary osteoarthritis of both knees        Primary osteoarthritis of left shoulder        Gait disorder        Nutritional assessment              The patient will return to clinic in 3 months.      Time Spent: I spent a total of 37 minutes on the day of the visit.This includes face to face time and non-face to face time preparing to see the patient (eg, review of tests), Obtaining and/or reviewing separately obtained history, Documenting clinical information in the electronic or other health record, Independently interpreting resultsand communicating results to the patient/family/caregiver, or Care coordination.        Xi Frank D.O, ABPN, AOBNP, ABEM    Patient seen and examined with Fany Kamara PA-C who served as scribe.    This note was created with voice recognition software.  Grammatical, syntax and spelling errors may be inevitable.

## 2022-07-06 NOTE — PROGRESS NOTES
Subjective:       Patient ID: Sophia Hope is a 66 y.o. female.    Chief Complaint: No chief complaint on file.      HPI    Mrs. Hope is a 66-year-old female who is presenting to the ALS Clinic for multidisciplinary care.  She was diagnosed with motor neuron disease after a progressive course of trouble walking and bulbar symptoms.  Her past medical history is also significant hypertension, hypothyroidism, osteopenia and arthritis She is being seen by the physical medicine and rehabilitation service for chronic low back pain, knee pain and shoulder pain due to OA.  She was last seen on 4/13/2022.She was maintained on meloxicam, baclofen and p.r.n. Tylenol.    The patient is coming to ALS clinic for follow up.  Her low back pain has been stable  It is an intermittent aching in the lower lumbar spine.  She denies any radiation to her legs.  It is aggravated mostly by weather changes.  Her max pain is 5-6/10 and minimum 0/10.  Today it is 0 10.  She has mild lower extremity weakness.  She denies any leg numbness.  She denies any bowel or bladder incontinence    Her bilateral knee pain has been under good control.  Her left shoulder pain has been stable.    She denies any significant muscle spasms.    She is currently taking:  Meloxicam 15 mg p.o. once per day (prescribed by her Primary Care Provider) but she has not been taking it recently  Baclofen 10 mg p.r.n. for pain and spasms, but she has not been taking it.  Tylenol p.r.n. usually few times per month.        Past Medical History:   Diagnosis Date    Arthritis     Benign colon polyp     Cataract     Diverticulosis     Glaucoma (increased eye pressure)     History of abnormal Pap smear     History of herpes zoster     Right flank 4/30/12    Hypertension     Hypothyroidism     Osteopenia     Postmenopausal         Review of patient's allergies indicates:   Allergen Reactions    Clonidine      Other reaction(s): Rash    Cardizem [diltiazem hcl]  Rash     Years ago per patient        Review of Systems   Constitutional: Positive for fatigue. Negative for chills and fever.   HENT: Negative for trouble swallowing.    Eyes: Negative for visual disturbance.   Respiratory: Negative for shortness of breath.    Cardiovascular: Negative for chest pain.   Gastrointestinal: Negative for blood in stool, constipation, nausea and vomiting.   Genitourinary: Negative for difficulty urinating.   Musculoskeletal: Positive for arthralgias, back pain and gait problem. Negative for neck pain.   Neurological: Positive for speech difficulty and weakness. Negative for dizziness and headaches.   Psychiatric/Behavioral: Negative for behavioral problems.             Objective:      Physical Exam  Vitals reviewed.   Constitutional:       General: She is not in acute distress.     Appearance: She is well-developed.      Comments: Coming to the clinic in a manual wheelchair propelled by family member     HENT:      Head: Normocephalic and atraumatic.   Musculoskeletal:      Cervical back: Normal range of motion.      Comments: BUE:  ROM:   RUE: full.   LUE: full.  Strength:    RUE: 5/5 at shoulder abduction, 5 elbow flexion, 5 elbow extension, 5 hand .   LUE: 5/5 at shoulder abduction, 5 elbow flexion, 5 elbow extension, 5 hand .  Sensation to pinprick:   RUE: intact.   LUE: intact.      BLE:  ROM:     RLE: full. Mild increase tone.     LLE: full. Mild increase tone.   Knee crepitus.:     RLE: +ve.      LLE: +ve.   Strength:    RLE: 3+/5 at hip flexion, 4+ knee extension, 5 ankle DF, 4 PF.   LLE: 3/5 at hip flexion, 4 knee extension, 5 ankle DF, 5 PF.  Sensation to pinprick:     RLE: intact.      LLE: intact.   SLR (sitting):      RLE: -ve.      LLE: -ve.          Skin:     General: Skin is warm.   Neurological:      Mental Status: She is alert and oriented to person, place, and time.         Assessment:       1. Bulbar motor neuron disease    2. Dysarthria    3. Spasticity    4.  Oropharyngeal dysphagia    5. Chronic midline low back pain without sciatica    6. Spondylosis of lumbar region without myelopathy or radiculopathy    7. Primary osteoarthritis of both knees    8. Primary osteoarthritis of left shoulder    9. Gait disorder        Plan:        - She was asked to avoid regular use of meloxicam due to increased BUN (25 6 months ago and 26 3 months ago)  - Start diclofenac sodium (VOLTAREN) 1 % Gel; Apply 2 g topically 3 (three) times daily as needed.  - Continue Tylenol 500 mg tablets, 1-2 tablets by mouth 3 times per day as needed for pain  - Continue baclofen 10 mg tablets, 1 tablet by mouth 3 times per day as needed for muscle spasms  - Follow up in ALS clinic.      This note was partly generated with [employer] voice recognition software. I apologize for any possible typographical errors.

## 2022-07-06 NOTE — ASSESSMENT & PLAN NOTE
Discussed with the patient the oral combination of Radicava ORS and Riluzole and patient is interested so will place orders for the patient to receive both of these medications    Discussed with the patient that she has more upper motor neuron signs

## 2022-07-06 NOTE — PROGRESS NOTES
"[facility]   Physical Therapy Screen    at [facility]     Date: 7/6/2022    Name: Sophia Hope   MRN: [id number]    Therapy Diagnosis:   Encounter Diagnoses   Name Primary?    ALS (amyotrophic lateral sclerosis) Yes    Dysarthria     Spasticity     Oropharyngeal dysphagia     Chronic midline low back pain without sciatica     Spondylosis of lumbar region without myelopathy or radiculopathy     Primary osteoarthritis of both knees     Primary osteoarthritis of left shoulder     Gait disorder     Nutritional assessment       Physician: Dr. Jose Drake and Dr. Xi Frank  Physician Orders: Screen at ALS Clinic  Plan of Care Expiration: Screening Only  Medical Diagnosis: ALS (G12.21)    Initial Clinic Evaluation Date: 4/13/2022  ALS Clinic Number: 2    Time In: 10:16  Time Out: 10:30  Total Billable Time: 7 minutes     Precautions: Standard and Fall    Subjective     This patient is currently receiving home health therapy (Physical)  services so a new  Physical  therapy evaluation was not completed today.   Family Present at time of screen: x3 grandchildren     History of Present Illness: Sophia is a 66 y.o. female that presents to [facility] secondary to dx of bulbar upper motor neuron disease. Symptom onset was April of 2021 with primarily speech and swallowing deficits.     Social History: lives with their family    Home Environment: single level home with threshold entry   DME: rollator and walker, adjustable bed, shower chair      Prior Level of Function: independence   Occupation: retired; hobbies include "relaxing and watching TV"  Exercise Routine / History: None reported    Current Level of Function: modified independence     Falls: none since last session     Pain:  0/10    Patient concerns: "feel weaker all over"; obtain therapy recommendations for equipment and home safety/activity levels      Objective "     Current functional level: Ambulation with Rolling Walker (RW) and close supervision by family; </= 50% assist with transfers (car, sit to stand, etc); utilizes bed functions for all bed mobility. Refer to Occupational Therapy note this date for for ADL/IADL.     Initial Evaluation Data:   Lower Extremity Strength  Right LE   Left LE     Hip flexion: 4/5 Hip flexion: 4/5   Hip extension:  4/5 Hip extension: 4/5   Hip abduction: 4+/5 Hip abduction: 4+/5   Hip adduction: 4+/5 Hip adduction 4+/5   Knee extension: 4+/5 Knee extension: 4+/5   Knee flexion: 4+/5 Knee flexion: 4+/5   Ankle dorsiflexion: 4+/5 Ankle dorsiflexion: 4+/5   Ankle plantarflexion: 4+/5 Ankle plantarflexion: 4+/5        Initial Evaluation      5x Chair Rise Test  Attempted but unable to complete (4 sit to stands with use of arms in 42 seconds)   Functional Reach Sitting 12 inches   Functional Reach Standing 6 inches        Assessment / Recommendations     Recommendations: Continue HHPT with recommendations to target transfer training, ambulation safety, stretching and with regression in strengthening exercises - sheet is issued to family to give to home therapist. Educated on purpose of power wheelchair and fatigue management with power wheelchair use - Pt to reach out when would like to move forward with power wheelchair orders as evaluation was completed by HHPT.       Patient to continue to follow up with referring provider and ALS clinic. No outpatient  Physical therapy warranted at current. No charges dropped. Continue home services.       Socorro Carrasquillo, PT, DPT, NCS   07/06/2022

## 2022-07-06 NOTE — PROGRESS NOTES
CHIEF COMPLAINT:  Follow-up (Nutrition-ALS clinic)    HISTORY OF PRESENT ILLNESS: Sophia Hope is a 66 y.o. female with onset of speech slowing and hoarseness in May/June 2021, then left leg weakness.  No history of lung disease, notes dyspnea while coordinating swallow.  Otherwise comfortable.  No orthopnea.  + sinus congestion treated year round with montelukast, controlled.  Occ excessive saliva. Using cough assist 2 x per week, no emergency use.  Notes ankle edema.      PAST MEDICAL HISTORY:    Past Medical History:   Diagnosis Date    Arthritis     Benign colon polyp     Cataract     Diverticulosis     Glaucoma (increased eye pressure)     History of abnormal Pap smear     History of herpes zoster     Right flank 4/30/12    Hypertension     Hypothyroidism     Osteopenia     Postmenopausal        PAST SURGICAL HISTORY:    Past Surgical History:   Procedure Laterality Date    BTL      CATARACT EXTRACTION W/  INTRAOCULAR LENS IMPLANT Left 12/19/2018    w/ ISTENT(INJ)    CATARACT EXTRACTION W/  INTRAOCULAR LENS IMPLANT Right 07/25/2018    W/ ISTENT    CERVIX LESION DESTRUCTION      Abnormal PAP    COLONOSCOPY N/A 5/29/2019    Procedure: COLONOSCOPY;  Surgeon: To Clay MD;  Location: [facility];  Service: Endoscopy;  Laterality: N/A;    hysteroscopic ablation      I-STENT Right 07/25/2018    DR. HENRY    PCIOL Right 07/25/2018    DR. HENRY    PA DILATION/CURETTAGE,DIAGNOSTIC      D & C    TONSILLECTOMY         FAMILY HISTORY:                Family History   Problem Relation Age of Onset    Cancer Mother         stomach cancer    Cataracts Mother     Hypertension Sister     Cancer Sister         liver cancer    Hypertension Sister     Heart disease Brother         MI/CAD    Cancer Daughter     Breast cancer Daughter     No Known Problems Son     Diabetes Neg Hx     Stroke Neg Hx     Blindness Neg Hx     Glaucoma Neg Hx     Macular degeneration Neg Hx     Retinal detachment  Neg Hx     Strabismus Neg Hx     Thyroid disease Neg Hx        SOCIAL HISTORY:          Occupation / Environment: Tax preparation, retired twice, 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  Social support: lives in [place], daughter Petty Sandoval, granddaughter Elena  Social History     Tobacco Use   Smoking Status Former Smoker    Years: 5.00    Types: Cigarettes    Quit date: 1983    Years since quittin.1   Smokeless Tobacco Never Used       ALLERGIES:    Review of patient's allergies indicates:   Allergen Reactions    Clonidine      Other reaction(s): Rash    Cardizem [diltiazem hcl] Rash     Years ago per patient       CURRENT MEDICATIONS:    Current Outpatient Medications   Medication Sig Dispense Refill    allopurinoL (ZYLOPRIM) 100 MG tablet TAKE 1 TABLET(100 MG) BY MOUTH EVERY DAY 90 tablet 1    atenoloL (TENORMIN) 100 MG tablet TAKE 1 TABLET(100 MG) BY MOUTH EVERY DAY 90 tablet 3    atenoloL (TENORMIN) 50 MG tablet TAKE 1 TABLET(50 MG) BY MOUTH EVERY DAY 90 tablet 3    baclofen (LIORESAL) 10 MG tablet Take 1 tablet (10 mg total) by mouth nightly as needed. For back pain 30 tablet 2    calcium carbonate-vitamin D2 600 mg calcium- 200 unit Cap Take 1 tablet by mouth Twice daily.      COMBIGAN 0.2-0.5 % Drop INSTILL 1 DROP IN BOTH EYES TWICE DAILY 10 mL 12    dextran 70-hypromellose (TEARS) ophthalmic solution Place 1 drop into both eyes. BOTH EYES PRN       diclofenac sodium (VOLTAREN) 1 % Gel Apply 2 g topically 3 (three) times daily as needed.      hydroCHLOROthiazide (HYDRODIURIL) 25 MG tablet TAKE 1 TABLET(25 MG) BY MOUTH EVERY DAY 30 tablet 5    irbesartan (AVAPRO) 300 MG tablet TAKE 1 TABLET(300 MG) BY MOUTH EVERY DAY 90 tablet 3    latanoprost 0.005 % ophthalmic solution INSTILL 1 DROP IN BOTH EYES EVERY DAY AT BEDTIME 2.5 mL 12    levothyroxine (SYNTHROID) 88 MCG tablet Take 1 tablet (88 mcg total) by mouth every morning. 90 tablet 3    meloxicam (MOBIC) 15 MG tablet TAKE 1 TABLET(15  "MG) BY MOUTH DAILY AS NEEDED 90 tablet 0    montelukast (SINGULAIR) 10 mg tablet TAKE 1 TABLET(10 MG) BY MOUTH EVERY DAY 90 tablet 1    pantoprazole (PROTONIX) 40 MG tablet TAKE 1 TABLET(40 MG) BY MOUTH EVERY DAY 30 tablet 11    pyridostigmine (MESTINON) 60 mg Tab Take 0.5 tablets (30 mg total) by mouth every 6 (six) hours. (Patient not taking: Reported on 5/9/2022) 60 tablet 3    riluzole 50 mg Tab tablet Take 1 tablet (50 mg total) by mouth every 12 (twelve) hours. 60 tablet 11     No current facility-administered medications for this visit.                  REVIEW OF SYSTEMS:   Pulmonary related symptoms as per HPI.  Gen:  no weight loss, no fever, no night sweats  HEENT:  tr sinus congestion, tr dysphagia, ++ voice changes - slowed, no sialorrhea  CV:  no chest pain, no orthopnea, no paroxysmal nocturnal dyspnea  GI:  no melena, no hematochezia, no diarrhea, no constipation.  :  no dysuria, no hematuria, no incontinence  Neuro:  + focal weakness.  Sleep:  restful         PHYSICAL EXAM:   Respiratory Rate: 14 NIV during clinic visit?  Vitals:    07/06/22 0941   Weight: 82.1 kg (181 lb)   Height: 5' 6" (1.676 m)     GENERAL:  Alert, calm, in no  distress  HEENT:  Normal conjunctiva, ptosis, normal EOM, nasal and oral mucosa normal, tongue + fascic  NECK:  supple; no palpable lymphadenopathy or masses, no jugular venous distention.  CVS: regular rate and rhythm, no murmers, gallops or rubs  PULM: Grossly decreased inspiratory capacity, normal to percussion and palpation, clear to auscultation bilaterally with no wheezes, crackles or rhonchi, ++ accessory muscle use with inspiratory effort  ABDOMEN:  soft nontender/nondistended, rise with inspiration  EXTREMITIES no cyanosis, clubbing, 1+ edema  NEURO:  Focal weakness, ambulatory with support    CONTRIBUTORY STUDIES:     PULMONARY FUNCTION TESTS: FVC 40% (1.06), , 98%      ACTIVE PULMONARY PROBLEMS:    ICD-10-CM ICD-9-CM    1. Bulbar motor neuron " disease  G12.20 335.20    2. Dysarthria  R47.1 784.51    3. Spasticity  R25.2 781.0    4. Oropharyngeal dysphagia  R13.12 787.22    5. Chronic midline low back pain without sciatica  M54.50 724.2     G89.29 338.29    6. Spondylosis of lumbar region without myelopathy or radiculopathy  M47.816 721.3    7. Primary osteoarthritis of both knees  M17.0 715.16    8. Primary osteoarthritis of left shoulder  M19.012 715.11    9. Gait disorder  R26.9 781.2    10. Nutritional assessment  Z00.8 V72.85        ASSESSMENT&PLAN:  1. Chronic neuromuscular respiratory failure in setting of ALS.  Will benefit from trilogy ventilator with advanced modes (AVAPS AE, PC). Bipap is not sufficient. Needs nocturnal/daytime ventilator. Will defer at this time.  2. Decreased cough efficacy.  Using cough assist.  3. Allergic rhinitis - controlled with montelukast  4. Gastrostomy tube may be indicated soon due to declining respiratory status over past three months. Introduced this idea to patient (here with young grandchildren).  5. LE edema - pt prefers elevation, defer diuretic      No follow-ups on file.      [Greater than 50% of this 30 minute visit spent counseling patient and family]

## 2022-07-06 NOTE — PROGRESS NOTES
[facility]      SPEECH THERAPY NEUROLOGICAL REHABILITATION EVALUATION    ALS MULTIDISCIPLINARY CLINIC     Date: 7/6/2022    Name: Sophia Hope   MRN: [id number]    Therapy Diagnosis:   Encounter Diagnoses   Name Primary?    Bulbar motor neuron disease Yes    Dysarthria     Spasticity     Oropharyngeal dysphagia     Chronic midline low back pain without sciatica     Spondylosis of lumbar region without myelopathy or radiculopathy     Primary osteoarthritis of both knees     Primary osteoarthritis of left shoulder     Gait disorder       Physician: Dr. Jose Drake and Dr. Xi Frank  Physician Orders: Ambulatory Referral for Speech  Plan of Care Expiration: Evaluation Only  Medical Diagnosis: ALS     Initial Clinic Evaluation Date: 4/13/2022  ALS Clinic Number: 2    Time In: 09:07 am  Time Out: 09:34 am  Total Minutes: 27 minutes   Procedure Min.   Evaluation of Speech Sound Production 17   Swallow and Oral Function Evaluation  10     Precautions: progressive degenerative disease, Standard and Fall, speech difficulty     Subjective   Date of Onset: March/April 2022. Symptoms began with left sided weakness, voice changes, and speech and swallowing difficulties   History of Current Condition: Sophia Hope  presents to the [facility] secondary to the diagnosis of ALS. No new medical changes since last clinic. This is her second clinic visit. However patient reported changes in her speech and family has more difficulty understanding her. Patient also has difficulty closing and keeping her eyes closed. Patient's grandchildren were present today.   Chief Complaint: Speech and swallowing difficulties   Past Medical History:  has a past medical history of Arthritis, Benign colon polyp, Cataract, Diverticulosis, Glaucoma (increased eye pressure), History of abnormal Pap smear, History of herpes zoster, Hypertension,  "Hypothyroidism, Osteopenia, and Postmenopausal. Sophia Hope  has a past surgical history that includes Cervix lesion destruction; hysteroscopic ablation; pr dilation/curettage,diagnostic; BTL; Tonsillectomy; PCIOL (Right, 07/25/2018); I-STENT (Right, 07/25/2018); Cataract extraction w/  intraocular lens implant (Left, 12/19/2018); Cataract extraction w/  intraocular lens implant (Right, 07/25/2018); and Colonoscopy (N/A, 5/29/2019).  Medical Hx and Allergies:  Sophia has a current medication list which includes the following prescription(s): allopurinol, atenolol, atenolol, baclofen, calcium carbonate-vitamin d2, combigan, dextran 70-hypromellose, hydrochlorothiazide, irbesartan, latanoprost, levothyroxine, meloxicam, montelukast, pantoprazole, pyridostigmine, and riluzole.   Review of patient's allergies indicates:   Allergen Reactions    Clonidine      Other reaction(s): Rash    Cardizem [diltiazem hcl] Rash     Years ago per patient     Prior Therapy: None                      Home Health Therapy at present time: No  Social History: Sophia lives in [place] with her daughter, Petty, son-in-law, Ava, and three grandchildren. Her son, Jose M, and his family live in the same neighborhood.  Prior Level of Function:  Independent  Current Level of Function: Modified independent for communication  Nutrition: regular with thin liquids  Recent MBSS:  12/9/2021- "Pt presents with a safe functional swallow across tested consistencies.  No aspiration or penetration was observed and her swallow strength and coordination was adequate."   Pain: 0/10  Pain Location/Description: n/a   Respiratory Status: Room air, appeared to be within functional limits   Vision: Appeared to be within functional limits. Reports she wears reading glasses, although she did not have them today.   Hearing: Appeared to be within functional limits in conversation. Will monitor and refer as necessary.   Objective     Patient's motor speech, " fluency, and voice were informally assessed. OME performed within Cranial Nerve Assessment detailed below. Motor speech skills were assessed and were not functional for daily communication with a variety of communication partners (i.e. Family, friends, medical personnel).   Respiration / Phonation:   Average Norms  /WFL Maximum Phon. Time (ah) Words Per Minute:   P^ 5 5.0-7.1 1 Trial 1: 4 57  words per minute   T^ 4 4.8-7.1 .8 Trial 2:  5 >125 = WFL    K^ 3 4.4-7.4 .6  <125 = refer for AAC eval   P^T^K^ 3 3.6-7.5 .6 Av.5 Norm: 160-170  (paragraph reading)       Norm (F 10-26, M 13-34.6) Norm: 150 to 250 (norm conversation)        Phonation Oral Reading Conversation   Stimulus Sustained ah:   [place] [address] History and Conversation   Quality hoarse, dysarthria, muffled, hypernasal, breathy hoarse, dysarthria, muffled, hypernasal, breathy  hoarse, dysarthria, muffled, hypernasal, breathy    Duration  4.5 seconds  1 minute  N/a   Loudness  monoloudness; decreased loudness  monoloudness; decreased loudness monoloudness; decreased loudness   Steadiness monopitch monopitch monopitch     Overall Speech Intelligibility: fair; 50% intelligible but demonstrated muffled, hypernasal vocal quality with decreased loudness with slow, imprecise articulation. Reduced breath-speech coordination evident.  Fatigue affects intelligibility.     Awareness / Strategy Use:   Patient demonstrates  adequate awareness of motor speech impairment. Speech was 50% intelligible with Sophia requiring no cues but patient has to repeat herself as speech becomes more imprecise.  Speech strategy: deep breath before speaking improves speech intelligibility significantly.    Impact of Motor Speech Impairment on Functioning:   Decreased speech intensity and clarity negatively impacts functional communication in all/most contexts.    Safety Risks: Decreased efficiency and effectiveness to communicate in an emergency situation.     Communicative  Effectiveness Survey: is a questionnaire given to the patient to  the effectiveness of her communicative function.       Communication Situation  Rating on a scale of 1 - 4  1= not at all effective   4= very effective   Having a conversation with a family member or friends at home  3   Participating in conversation with strangers in a quiet place 3    Conversing with a familiar person over the telephone  3   Conversing with a stranger over the telephone  2    Being part of a conversation in a noisy environment (social gathering) 1   Speaking to a friend when you are emotionally upset of you are angry 4   Having a conversation while traveling in a car 3   Having a conversation with someone at a distance (across a room) 3     Augmentative/Alternative Communication (AAC): Patient is not currently using an augmentative/ alternative communication device. Recommend a full AAC evaluation. In the meantime, it was recommended that the patient use communication apps such as Speech Assistant to supplement speech. Pt utilizes Text to Speech akash sometimes.   Type of AAC Device:  n/a  Recommend AAC Eval: yes      Clinical Swallow Exam/ Cranial Nerve Exam:  Cranial Nerve 5: Trigeminal Nerve  Motor Jaw Posture at rest: Closed  Mandible Elevation/Depression: WFL  Mandible lateralization: WFL  Abnormal movement: absent Interpretation: within functional limits   Sensory Forehead: WFL  Cheek: WFL  Jaw: WFL  Facial Pain: None noted Interpretation: within functional limits     Cranial Nerve 7: Facial Nerve  Motor Facial Symmetry:  WNL  Wrinkle Forehead: WFL  Close eyes tightly: reduced, unable to keep eyes closed  Labial Protrusion: reduced  Labial Retraction: reduced  Labial alternating movement: reduced  Cheek puffing sustained: reduced  Cheek puffing against resistance: reducferd  Abnormal movement: absent Interpretation: reduced   Sensory Formal testing not completed. Pt denied any changes in taste      Cranial Nerves IX and X:  Glossopharyngeal and Vagus Nerves  Motor Palatal Symmetry (Rest): WNL  Palatal Symmetry: (Movement) WNL  Palatal Elevation (Movement): WNL   Palatal Elevation (Sustained): WNL  Cough: Perceptually weak  Voice Prior to PO intake: hoarse  Resonance: muffled  Abnormal movement: absent Interpretation: within functional limits     Cranial Nerve XII: Hypoglossal Nerve  Motor Tongue at rest: atrophy and fasciculations  Lingual Protrusion: reduced; deviates towards left side   Lingual Protrusion against Resistance: reduced  Lingual Lateralization: reduced  Abnormal movement: present Interpretation: reduced     Other information:   Volitional Swallow: Able to palpate laryngeal rise   Mucosal Quality: No abnormal findings   Secretion Management: Patient reported no drooling but saliva pooling.   Dentition: Good condition for speech and mastication      EAT-10 Score:    Eating Assessment Tool (EAT-10) is a questionnaire given to the patient to  her swallowing function.     Key: 0= no problem; 4= severe problem  1. My swallowing problem has caused me to lose weight. - 0  2. My swallowing problem interferes with my ability to go out for meals. - 0  3. Swallowing liquids takes extra effort. - 1  4. Swallowing solids takes extra effort. - 1  5. Swallowing pills takes extra effort. - 1  6. Swallowing is painful. - 0  7. The pleasure of eating is affected by my swallowing. - 1  8. When I swallow food sticks in my throat. - 1  9. I cough when I eat. - 0  10. Swallowing is stressful. - 0    Sophia ranked her swallowing function as a 5/40     Interpretation: Score of greater than 3 is indicative of a swallowing disorder (Zoila et al., 2008); higher scores correlate with increased penetration-aspiration  scale scores, and scores greater than 15 results in the patient being 2.2 times more likely to aspirate (Namita et al., 2015)    References:   NA Cummings., MICKEY Bhatt., CATRACHO Gallegos., RONALD Barone., Zoya, G. N., Jose M  RONALD, & JAYE Aleman (2008). Validity and reliability of the Eating Assessment Tool (EAT-10). Annals of Otology, Rhinology & Laryngology, 117(12), 919-924. [email or web address]  MICKEY Breaux., IRON Hatch., RONALD Jensen., [place], MMj JOLLEY., & NA Cummings. (2015). The ability of the 10-item eating assessment tool (EAT-10) to predict aspiration risk in persons with dysphagia. Annals of Otology, Rhinology & Laryngology, 124(5), 351-354. [email or web address]      [place] Swallow Protocol:  Did not test.   [place] Swallow Protocol dictates pt remain NPO if fail screener; (Raphaelr et al. 2014) however, as objective swallow assessment is not available for greater than a week, pt will remain on current diet until objective assessment is completed unless otherwise indicated.     Clinical Swallow Examination:   Pt presented with:   THIN:- self regulated thin liquid via cup    PUREE:- not administered     SOLID: -bite of jorge luis cracker x1     DESCRIPTION: Oral Phase: The patient had no anterior loss of the bolus with adequate closure of the lips around the cup. No residue remained in the oral cavity following the swallow of jorge luis cracker. No overt s/s of aspiration.   Pharyngeal:  Laryngeal elevation palpated.    During today's clinical swallow reassessment, pt exhibited no significant difficulty swallowing. There were no overt signs/symptoms of aspiration.  However, the FEES on 5/23/22 indicated mild dysphagia. See details below.     Recommend Modified Barium Swallow Study (MBSS) or Fiberoptic Endoscopic Evaluation of Swallowing (FEES): no; Patient had FEES on 5/23/22  Findings of FEES completed on 5/23/2022 by speech-language pathologist, Magda Dc,   Pharyngoscopic and laryngoscopic findings revealed within functional limits initiation of the swallow, mildly reduced tongue base retraction, within functional limits epiglottic movement, mildly reduced pharyngeal contraction, within  functional limits laryngeal vestibule closure, and complete pharyngoesophageal segment opening. Patient independently utilized multiple swallows per bolus and demonstrated ability to utilize hard effortful swallow, alternate solid with liquid wash, and small bites/sips to decrease risk for residue. Recommend IDDSI level 0 liquid diet consistency; IDDSI level 7 food diet consistency.    Education   Patient and her family were educated on the recommendations, AAC, voice banking, message banking and swallowing precautions. They verbalized understanding of all discussed.     Assessment   Impressions: Sophia Hope exhibited moderate mixed spastic-flaccid dysarthria c/b imprecise consonants, slow rate of speech and inadequate breath- speech coordination  and hypernasality, low intensity, hoarseness  and muffled vocal quality and suspected oropharyngeal dysphagia c/b patient reporting swallowing liquids takes extra effort, food sticking in her throat occasionally when swallowing due to ALS. Demonstrates impairments including limitations as described in the problem list. Positive prognostic factors include family support. Negative prognostic factors include progressive nature of disease. Barriers to progress include complex medical history and progressive nature of disease. Patients self assessment on the EAT-10 and communication effectiveness survey improved since last administered during the last ALS clinic.     Patient's spiritual, cultural and educational needs considered and pt agreeable to plan of care and goals.    ALS Severity Scale:  Stage 3: Behavioral Modifications: Speaking rate is much slower than normal. The speaker repeats specific words in adverse listening situations and may limit the complexity or length of messages. ALS Severity Scale: 5 or 6     Rehab Potential: fair to guarded     Plan    Recommended Treatment Plan:   1. Follow up with [facility] in 3 months   2. Initiate  "voice banking/message banking with Team Vicenta    Plan of care expiration: Eval Only     Other Recommendations: none    LEONIE Nixon   Clinician  Speech-Language Pathology    "I, Cherelle Paz, certify that I was present in the room directing the student and service delivery and guiding them using my skilled judgement. As the co-signing therapist, I have reviewed the student's documentation, and am responsible for the treatment, assessment and plan."     Cherelle Paz M.S.CCC-SLP  Speech Language Pathologist        "

## 2022-07-06 NOTE — PATIENT INSTRUCTIONS
"ALS Clinic After Visit Notes:  Please reach out to the clinic coordinator if you do not receive equipment / follow up calls for services that were ordered today within 1 week.     Pulmonary - if you feel short of breath when lying down or your sleep quality is not good, we can order a mask/breathing support to wear at night.  We will followup with you about the feeding tube question - it is best to do this soon, even before you need it, so that it can be done safely.      Neuro- Patient is concerned with eyelid droop and possibly bleph surgery concern for future use of eyegaze if needed    Speech Therapy:   1. Follow up with [facility] in 3 months   2. Initiate voice banking/message banking with Team Dierks  3. AAC evaluation at the [facility] in [place]     Occupational Therapy         Saving Energy: Motion Economy     AVOID RUSHING   Organize activities and try to do them at the same way at all times. Repetition  makes you proficient and will save time and energy.    Take frequents rests. This will prevent over fatigue and leave you ready to go on  with other activities. Do diaphragmatic breathing when resting.   Spread heavy and light tasks throughout the day. Consider when the best time is  for each activity. When do you have the most energy and move your best?   Set priorities and eliminate unnecessary tasks.   Analyze each task to be done, develop an easier method- "work simplification".    AVOID UNNECESSARY MOTIONS   Sit instead of stand for any lengthy task (greater than 5-10 minutes).   Use both hands in a smooth flowing motion, in opposite and symmetrical  Direction.   Avoid holding or lifting. Use a wheeled cart or slide things.   Avoid over reach and bending by arranging work area within normal reach.   Avoid lifting, pushing, or carrying heavy things.   Avoid running up or down stairs.   Arrange work space for specific tasks with supplies and equipment arranged well.   Live " simply, avoid unnecessary cluttering of items.    Use Labor saving equipment.   Good posture prevents fatigue.    USE GOOD BODY MECHANICS   Use stronger muscles to push, pull, lifting, and climbing.    PROPER WORKING CONDITIONS   Correct counter height for standing- 2 inch below bent elbow.   Correct chair height. 2 inch below elbow.    Work within range of reach putting frequent used items for easier reach.    Obtain items needed prior to starting task.    Good air flow.   Good lighting.   Work in a Relaxed manner- use music, simplify task, use diaphragmatic breathing, walk slowly.   Exhale when you do strenuous part of activity.    Mental hygiene- control worrying, use your energy to change things you can and accept those things you can't change.   Encourage family teamwork to help.       Physical Therapy:   Please let us know when you are ready for move forward with power wheelchair ordering/obtainment.   Continue HHPT - addressing stretching, transfers, safe mobility/ambulation. Please show HHPT the sheet issued at this meeting.

## 2022-07-06 NOTE — PROGRESS NOTES
MNT follow-up in ALS Multidisciplinary clinic:    Pt present today with three grandchildren, who stay with her during the day.  Pt states her appetite is good, and she continues on regular diet as tolerated.  Dinner last night:  Turkey sandwich with cheese, meza on white bread, Cheetoes and Kiah Sun.  Is drinking one Boost po supplement daily.  Snacks:  Ice cream, pudding, jello.    Weight history:    Today, 7/06/2022:  82.1 kg (181 lbs) - standing scale               4/13/2022:  83.2 kg (183.4 lbs) - standing scale    Note:  5/23/2022 FEES recc IDDSI level 0 liquid diet consistency, IDDSI level 7 food diet consistency.    Neither pt nor her grandchildren voiced any nutrition-related questions/concerns today.  Will continue to follow to monitor nutritional status per ALS clinic protocol.

## 2022-07-07 ENCOUNTER — OFFICE VISIT (OUTPATIENT)
Dept: OPHTHALMOLOGY | Facility: CLINIC | Age: 67
End: 2022-07-07
Payer: MEDICARE

## 2022-07-07 DIAGNOSIS — H40.1231 LOW-TENSION GLAUCOMA OF BOTH EYES, MILD STAGE: Primary | ICD-10-CM

## 2022-07-07 DIAGNOSIS — Z96.1 PSEUDOPHAKIA OF BOTH EYES: ICD-10-CM

## 2022-07-07 PROCEDURE — 99213 OFFICE O/P EST LOW 20 MIN: CPT | Mod: PBBFAC | Performed by: OPHTHALMOLOGY

## 2022-07-07 PROCEDURE — 99214 PR OFFICE/OUTPT VISIT, EST, LEVL IV, 30-39 MIN: ICD-10-PCS | Mod: S$PBB,,, | Performed by: OPHTHALMOLOGY

## 2022-07-07 PROCEDURE — 99214 OFFICE O/P EST MOD 30 MIN: CPT | Mod: S$PBB,,, | Performed by: OPHTHALMOLOGY

## 2022-07-07 PROCEDURE — 99999 PR PBB SHADOW E&M-EST. PATIENT-LVL III: CPT | Mod: PBBFAC,,, | Performed by: OPHTHALMOLOGY

## 2022-07-07 PROCEDURE — 99999 PR PBB SHADOW E&M-EST. PATIENT-LVL III: ICD-10-PCS | Mod: PBBFAC,,, | Performed by: OPHTHALMOLOGY

## 2022-07-07 NOTE — PROGRESS NOTES
SUBJECTIVE  Sophia JACK Hope is 66 y.o. female  Uncorrected distance visual acuity was 20/25 in the right eye and 20/40 -1 in the left eye.   Chief Complaint   Patient presents with    Glaucoma          HPI     Pt in today for a 4 month IOP check. Pt states her vision has been doing   well. Denies any ocular pain or discomfort. Compliant with drops.    1. Mild LTG No FHx (init 17/17=21/21 adjusted for thin K's)  Goal <14  CCT /  2. PCIOL I-Stent (inject) OS +20.5 SN60WF (distance)12/19/2018  PCIOL / I-Stent OD +20.5 SN60WF (distance) 7-25-18  3. Ptosis    AT's prn OU    Combigan QAM OD  Xalatan QHS OU      Last edited by Ashleigh Zavaleta on 7/7/2022  9:02 AM. (History)         Assessment /Plan :  1. Low-tension glaucoma of both eyes, mild stage Doing well, intraocular pressure (IOP) within acceptable range relative to target IOP and no evidence of progression. Continue current treatment. Reviewed importance of continued compliance with treatment and follow up.      Patient instructed to continue using the following glaucoma medication as follows:  Latanoprost one drop in each eye nightly and Combigan one drop in the right eye every morning    Return to clinic in 4 months  or as needed.  With GOCT, Dilation and HVF 24-2       2. Pseudophakia of both eyes  -- Condition stable, no therapeutic change required. Monitoring routinely.

## 2022-07-08 ENCOUNTER — SOCIAL WORK (OUTPATIENT)
Dept: NEUROLOGY | Facility: CLINIC | Age: 67
End: 2022-07-08
Payer: MEDICARE

## 2022-07-08 ENCOUNTER — TELEPHONE (OUTPATIENT)
Dept: NEUROLOGY | Facility: CLINIC | Age: 67
End: 2022-07-08
Payer: MEDICARE

## 2022-07-08 NOTE — PROGRESS NOTES
ERIKA placed call to patient's son Renan.   ERIKA reported he met with MS Hope and her grandchildren.   Erika reported he emailed Keila but it was requested to also update Renan.   ERIKA requested email and he provided [email or web address].     Erika requested that family follow up with [employer] waiver program via phone call.     ERIKA requested email or call back if needed.

## 2022-07-12 ENCOUNTER — EXTERNAL HOME HEALTH (OUTPATIENT)
Dept: HOME HEALTH SERVICES | Facility: HOSPITAL | Age: 67
End: 2022-07-12
Payer: MEDICARE

## 2022-07-22 ENCOUNTER — DOCUMENT SCAN (OUTPATIENT)
Dept: HOME HEALTH SERVICES | Facility: HOSPITAL | Age: 67
End: 2022-07-22
Payer: MEDICARE

## 2022-07-24 PROBLEM — R25.2 SPASTICITY: Status: ACTIVE | Noted: 2022-07-24

## 2022-07-24 PROBLEM — R47.1 DYSARTHRIA: Status: ACTIVE | Noted: 2022-07-24

## 2022-07-24 PROBLEM — R26.89 IMPAIRED GAIT AND MOBILITY: Status: ACTIVE | Noted: 2022-07-24

## 2022-07-25 ENCOUNTER — DOCUMENT SCAN (OUTPATIENT)
Dept: HOME HEALTH SERVICES | Facility: HOSPITAL | Age: 67
End: 2022-07-25
Payer: MEDICARE

## 2022-07-25 NOTE — ASSESSMENT & PLAN NOTE
She is interested in blepharoplasty.  Will need to speak with pulmonology regarding safety for surgery.

## 2022-07-25 NOTE — ASSESSMENT & PLAN NOTE
She has completed a PWC evaluation but she has not proceeded with obtaining a chair.  She is still contemplating this big decision. She is a candidate for PWC due to declining strength and balance.

## 2022-07-25 NOTE — ASSESSMENT & PLAN NOTE
Voice banking was not finished.  This patient has progressive dysarthria due to ALS. Communication is becoming more problematic. Will require an augmented communication device in order to continue communicating with family and caregivers.

## 2022-08-04 ENCOUNTER — TELEPHONE (OUTPATIENT)
Dept: NEUROLOGY | Facility: CLINIC | Age: 67
End: 2022-08-04
Payer: MEDICARE

## 2022-08-04 NOTE — TELEPHONE ENCOUNTER
Received via fax from [facility], Client Coordination note report.  Copy sent to scanning.

## 2022-08-09 ENCOUNTER — CLINICAL SUPPORT (OUTPATIENT)
Dept: REHABILITATION | Facility: HOSPITAL | Age: 67
End: 2022-08-09
Attending: PSYCHIATRY & NEUROLOGY
Payer: MEDICARE

## 2022-08-09 DIAGNOSIS — G12.21 ALS (AMYOTROPHIC LATERAL SCLEROSIS): ICD-10-CM

## 2022-08-09 DIAGNOSIS — R47.1 DYSARTHRIA: Primary | ICD-10-CM

## 2022-08-09 PROCEDURE — 92607 EX FOR SPEECH DEVICE RX 1HR: CPT | Performed by: SPEECH-LANGUAGE PATHOLOGIST

## 2022-08-09 PROCEDURE — 92608 EX FOR SPEECH DEVICE RX ADDL: CPT | Performed by: SPEECH-LANGUAGE PATHOLOGIST

## 2022-08-09 NOTE — PLAN OF CARE
Outpatient Neurological Rehabilitation  Augmentative and Alternative Communication Evaluation     Date:  8/9/2022   Start Time: 9:07 AM  Stop Time:  11:05 AM        Name: Sophia Hope   MRN: [id number]    Therapy Diagnosis:   Encounter Diagnosis   Name Primary?    Dysarthria Yes    Physician: Xi Frank,*  Physician Orders: Ambulatory consult to speech therapy   Medical Diagnosis: ALS (amyotrophic lateral sclerosis) [G12.21], Dysarthria [R47.1]    Visit #:  1  Visits Authorized: 1  Date of Evaluation:  8/9/2022   Insurance Authorization Period: 7/28/22-12/31/22  Plan of Care Certification:    8/9/22- 12/27/22     Procedure Min.   SP EVAL ALT COM DEV 1HR [36661 (CPT®)]  60   SP EVAL A/C DEV ADD 30M [38386 (CPT®)]  30   SP EVAL A/C DEV ADD 30M [87848 (CPT®)]  28     Total Minutes: 118  Total Timed Units: 3  Charges Billed/# of units: 06257/1, 76923/2  Precautions:Standard, Fall and communication      Subjective     History of Current Condition:   Patient presents on time with her daughter and grandson for AAC evaluation. Patient reports initial speech changes onset ~May/June 2021. 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 She follows with the ALS clinic. She reports she did not complete voice banking. She lives with her daughter in [place], [place]; she retired from the [employer] with the [employer] in December 2021.     Past Medical History: Sophia Hope  has a past medical history of Arthritis, Benign colon polyp, Cataract, Diverticulosis, Glaucoma (increased eye pressure), History of abnormal Pap smear, History of herpes zoster, Hypertension, Hypothyroidism, Osteopenia, and Postmenopausal.  Sophia Hope  has a past surgical history that includes Cervix lesion destruction; hysteroscopic ablation; pr dilation/curettage,diagnostic; BTL; Tonsillectomy; PCIOL (Right, 07/25/2018); I-STENT  (Right, 07/25/2018); Cataract extraction w/  intraocular lens implant (Left, 12/19/2018); Cataract extraction w/  intraocular lens implant (Right, 07/25/2018); and Colonoscopy (N/A, 5/29/2019).  Medical Hx and Allergies: Sophia has a current medication list which includes the following prescription(s): allopurinol, atenolol, atenolol, baclofen, calcium carbonate-vitamin d2, combigan, dextran 70-hypromellose, hydrochlorothiazide, irbesartan, latanoprost, levothyroxine, meloxicam, montelukast, pantoprazole, and riluzole.   Review of patient's allergies indicates:   Allergen Reactions    Clonidine      Other reaction(s): Rash    Cardizem [diltiazem hcl] Rash     Years ago per patient     Prior Therapy:  Following with PT/ST/OT at ALS clinic;  physical therapy   Social History:  Lives with her daughter; retired from [employer] in Dec 2021  Prior Level of Function: WFL   Current Level of Function:  Progressive dysarthria and dysphagia   Pain:   0/10  Pain Location / Description: NA  Patient Therapy Goals:  AAC evaluation/establish communication system     Objective     Augmentative/Alternative Communication (AAC) Evaluation:  Language Skills: informally evaluated throughout this assessment and appears to be within functional limits   ? Auditory Comprehension: within functional limits for the purpose of this evaluation   ? Verbal Expression: motor speech significantly impacts oral expression. Verbal expression characterized by halting, strain/strangled, hypernasal and slow speech.   ? Reading Comprehension: within functional limits for the purpose of this evaluation   ? Written Expression: limited due to mild hand weakness      Cognition: informally evaluated throughout this assessment and appears to be within functional limits      Motor Speech Skills: severe mixed Spastic-Flaccid dysarthria impacts functional communication in all contexts. Due to progressive nature of ALS, speech intelligibility, breath  support, and articulatory muscles will continue to weaken, further impacting ability to communicate verbally in functional communication contexts.   ? Fluency: Patient does not present with a dysfluency  ? Voice: Secondary to mixed Spastic-Flaccid dysarthria, voice characterized by harshness, breathiness, hypernasality, and strain-strangled quality.      Hearing / Vision: Patient reports bilateral eyelid drooping; however, she denies concerns with visual acuity. Hearing/vision appear to be within functional limits in conversation and for the purpose of this evaluation. Will monitor and refer as necessary.       Physical Status: Uses wheelchair/walker. Per family, patient does not use assistive equipment at home. Patient reports mild LUE weakness; she denies RUE weakness. Patient possesses the physical abilities to effectively use a SGD and required accessories to communicate.       Specific Daily - Functional Communication Needs:  Sophia Hope's ability to communicate was limited due the effort required for speech and her inability to be fully understood by unfamiliar listeners. Due to the degenerative nature of ALS, her speech intelligibility, breath support, and articulatory (oral motor) muscles will continue to weaken.    Sophia must communicate regularly with family, therapists, nurses, and/or caregivers regarding daily activities, personal needs, medical needs, and social interactions in all environments such as home, doctor visits, and in the community. she needs to communicate messages to convey basic needs to various caregivers in meeting these needs. Sophia must communicate with her family to discuss medical decisions and also needs to report medical conditions to medical and nursing personnel. Sophia must communicate messages to express her needs, make requests, ask questions, offer information, express opinions/feelings and provide information to medical personnel.     Ability to Meet Communication  Needs without a Speech Generating Device (SGD)  Sophia Hope's speech does not allow her to meet her daily functional communication needs. Low-tech solutions such as communication boards or books (with pictures or words) do not allow her to meet daily communication needs because she requires a communication system that allows her to communicate wants, needs, and opinions in a dynamic/complex way and low-tech solutions do not allow her to generate novel or spontaneous utterances.     Physical limitations limit communication by weakening upper and lower extremity muscles.  Fatigue affects communication by: reduced intelligibility for functional communication over the course of the day. These factors combined create inefficient and ineffective communication with a variety of communication partners using low-tech options. A low tech board does not allow Sophia to summon help from someone in another room, or be heard over the phone.     Message Characteristics/Features  Sophia demonstrates the need for phrase based messages, text based messages, single words and access to a keyboard to generate novel messages. Letters and single words allow her to communicate regarding specific and novel topics. Phrases and sentences allow her to communicate more routine utterances more efficiently.      The SGD must have the ability to store a large number of pre-programmed messages regarding a variety of topics. Rate enhancement techniques such as word prediction are required to allow Sophia to communicate quickly and easily without having to spell everything out. Visual word/picture symbol displays for a minimum of 45 symbol overlay. All of these features allow a communication device to be customized for each individual. This is important to allow Sophia  to communicate her needs without assistance.     Input Features  Given the current status of physical skills, she requires an SGD that will allow for the use of direct touch.     "  Output Features  Sophia is able to to spell and formulate sentences; she requires a device with synthesized speech to allow her to be the most natural communicator possible.     Other Features  - The SGD must be able to be easily understood over the phone.  - The SGD must have an adequate battery life to allow for use throughout the day with minimal or not recharging (except at night).     Recommended Medicare Device Category  The features identified above as being required for Sophia to be a functional communicator describe the SGD type in the 2510 device category (i.e. synthesized speech, message formulation via spelling and other methods, multiple access methods.)     Detailed Description of Trials with Various SGDs  Sophia participated in a 118 minute evaluation to formally assess the use of a communication device. She was introduced to three SGDs which offer word/picture displays and voice output.    The [employer] I-13 was trialed without eyegaze and TD Snap as well as Communicator 5 software. The I-13 comes with a 13" touch screen display and weighs 5 pounds. The I-13 device is portable and has a 8 to 9 hour battery life. It has an extensive pre-programmed vocabulary and use of rate-enhancements such as word prediction. It also offers word and phrase sets and allows the user to navigate through pages. The device was accessed via direct touch access method most effectively. A 84 location grid size was deemed most appropriate. A keyguard  was not trialed and was not needed. Sophia Hope most effectively used the [employer] I-13 due to its TD Snap and Communicator 5 language program and accessability, customability, and user interface. She was able to locate pre-stored sentences as well as spell on the keyboard of this device. She also was able to effectively use its extensive pre-programmed vocabulary and use of rate-enhancements such as word prediction. Examples of communicative messages used were: her " "name "Sophia", "I'm fine" in response to questions, as well as creation/customization of buttons with minimal verbal assistance.    [employer] Accent 1000 with [place] family of language systems Essence and Word Power language software was trialed. The [employer] Accent 1000 comes with a 10" touch screen display and is 3.0 pounds. It has a 10 - 12 hour battery life. The Accent (all models) features a variety of voices for digitized and synthesized speech generation. It uses a combination of core and fringe vocabulary featuring phrase based language system. The device was accessed via direct touch access method. A 64 location grid size was deemed most appropriate. A keyguard was not trialed and was not needed. Sophia Hope used the Accent 1000 with to its portability with Essence language.  She had difficulty locating pre-stored sentences as well as spelling on the keyboard of this device. She also had difficulty creating new buttons/messages. She required moderate-maximum assistance with basic navigational tasks such as volume control and turning device on and off. She was able to spell her name "Sophia" using direct touch access method.     [employer] Accent 1400 with [place] family of language systems Essence and Word Power language software was trialed. The [employer]Card Accent 1400 comes with a 14" touch screen display and is 5.95 pounds. It has a 10 - 12 hour battery life. The Accent (all models) features a variety of voices for digitized and synthesized speech generation. It uses a combination of core and fringe vocabulary featuring  phrase based  language system. The device was accessed via direct touch access method.  A keyguard was not trialed and was not needed. This device was immediately deemed to be too large for patient to move around her home and other communication environments and therefore this device was ruled out.     Recommendation of Tobii Dynavox I-13 is not a matter of convenience, but, rather a matter of " medical necessity as the other devices trialed did not allow client communicative success.  Without the receipt of the TD I-13 SGD, it is highly likely that the client would abandon any other speech generating device, as it proves not useful for her to communicate critical information for her health and well-being. When client was utilizing/trialing other devices, client was not successful in communicating her medical needs, which puts client at risk of further medical complications     Patient/Family support of SGD  Sophia demonstrated both the skill and the motivation to use the SGD type. Sophia and her family/caregivers will be responsible for the care, programming and troubleshooting of her SGD. Sophia and her family and caregivers have been informed about free training sessions and assistance provided by SGD local representatives, phone-based technical support, and on-line technical support.     Education: Plan of Care and role of SLP in care was discussed with patient and her family. Patient and family members expressed understanding.     Assessment   Impairment Type and Severity:  Sophia Hope  is a 67 y.o. female with a medical diagnosis of ALS (amyotrophic lateral sclerosis) [G12.21], Dysarthria [R47.1]. She lives with daughter who serves as her primary caregiver. Sophia Hope presents with severe mixed Flaccid-Spastic dysarthria characterized by reduced lingual, labial, and buccal strength and range of motion; her speech is characterized by reduced loudness, slow rate, low and mono-pitch, breathiness, harshness, strain-strangled vocal quality, hypernasality, and imprecise consonants which is impacting overall intelligibility and use of voice/speech for functional communication.     Flors speech does not allow her to meet the daily functional communication needs due to her severe dysarthria. Her speech is overall unintelligible and difficult to hear. Low tech solutions such as communication boards  (with pictures or words) do not allow Sophia to meet daily communication needs because they are limiting in opportunities to communicate various wants, needs, and opinions in a dynamic way. Written expression would not allow Sophia to summon help from another room or be understood over the phone. Sophia trialed various SGD's and decided that the Tobii Dynavox I-13 with accessories (strap) would be the most effective device given a feature match for easy access and portability. The Tobii Dynavox I-13 SGD will improve her ability to functionally communicate her medical wants and needs and participate in her functional living environment. She would benefit from a SGD to decreased frustration associated with communication, increased lexical variety, increase independence with ADL's and increase ability to communicate in the case of an emergency. Positive prognostic factors include family support, patient motivation and engagement with devices in today's evaluation. Negative prognostic factors include progressive nature of ALS. No barriers to therapy.      Anticipated Course of Impairment  On the Dysarthria due to Amyotrophic Lateral Sclerosis Staging Scale, Sophia presents with Stage 4: Use of Alternative/Augmentative Communication (AAC): Intelligibility problems need to be resolved by writing or a spokesperson. The speaker may initiate communication nonverbally. ALS Severity Scale: 3 or 4 Flors communication impairment is degenerative and progressive. Her speech is not expected to improve nor is she expected to regain functional speech abilities.    Rehab Potential: guarded  Pt's spiritual, cultural and educational needs considered and pt agreeable to plan of care and goals.    Short Term Goals (10 weeks):   Patient will communicate personal and medical needs, feelings, and opinions and make requests to familiar and unfamiliar communication partners independently with on 9/10 attempts.   Patient will greet and  initiate conversation independently 5x per session  Given a specific message to find, patient will independently navigate to the correct page on 8/10 attempts.   Patient will demonstrate comprehension of basic maintenance and operations (on-off, adjusting volume) of the SGD on 5/5 attempts.      Long Term Goals (20 weeks):   1. Sophia Hope will use the SGD to efficiently interact with familiar and unfamiliar communication partners to improve functional communication.   2. Sophia Hope will use the SGD to express medical emergency situations or health related information independently to communication partners to improve functional communication.    Plan     Authorization Period: 7/28/22 to 12/31/22   Plan of Care Certification Period: 8/9/22 to 12/27/22      Recommended Treatment Plan:  1-2 times per week to address AAC device training and language deficits. Additionally, it is recommended that Flors family and /or caregivers receive approximately 2 hours of training regarding the care, use, and programming of the device (within 4 weeks of receiving device).     Other Recommendations: NA      SGD and Accessories Recommended     The following is recommended for Sophia Hope:  · Tobii Dynavox I-13  · Strap     Physician Involvement Statement     A copy of this report, including treatment plan and goals, has been forwarded to Sophia Hope's treating physician prior to ordering the Tobii Dynavox I-13 and components.                                      The undersigned Speech/Language Pathologist has no financial relationship with nor will receive any financial gain from the supplier of this device.     Fany Johnson CCC-SLP   Speech Language Pathologist  8/9/2022      Therapist's Name:   _________________________________________  Date: 8/9/2022      I certify the need for these services furnished under this plan of treatment and while under my care.  ____________________________________  Physician/Referring Practitioner   Date of Signature

## 2022-08-09 NOTE — PROGRESS NOTES
See initial evaluation in plan of care.    Fany Johnson MA, CCC-SLP  Speech Language Pathologist  8/9/2022

## 2022-09-13 ENCOUNTER — PATIENT MESSAGE (OUTPATIENT)
Dept: FAMILY MEDICINE | Facility: CLINIC | Age: 67
End: 2022-09-13
Payer: MEDICARE

## 2022-09-22 ENCOUNTER — OFFICE VISIT (OUTPATIENT)
Dept: FAMILY MEDICINE | Facility: CLINIC | Age: 67
End: 2022-09-22
Payer: MEDICARE

## 2022-09-22 VITALS
HEIGHT: 66 IN | HEART RATE: 70 BPM | BODY MASS INDEX: 29.21 KG/M2 | TEMPERATURE: 98 F | DIASTOLIC BLOOD PRESSURE: 78 MMHG | OXYGEN SATURATION: 97 % | SYSTOLIC BLOOD PRESSURE: 122 MMHG

## 2022-09-22 DIAGNOSIS — R47.1 DYSARTHRIA: ICD-10-CM

## 2022-09-22 DIAGNOSIS — H40.1231 LOW-TENSION GLAUCOMA OF BOTH EYES, MILD STAGE: ICD-10-CM

## 2022-09-22 DIAGNOSIS — I10 ESSENTIAL HYPERTENSION: Primary | ICD-10-CM

## 2022-09-22 DIAGNOSIS — R26.89 IMPAIRED GAIT AND MOBILITY: ICD-10-CM

## 2022-09-22 DIAGNOSIS — G12.21 ALS (AMYOTROPHIC LATERAL SCLEROSIS): ICD-10-CM

## 2022-09-22 DIAGNOSIS — E03.4 HYPOTHYROIDISM DUE TO ACQUIRED ATROPHY OF THYROID: Chronic | ICD-10-CM

## 2022-09-22 PROCEDURE — 99214 OFFICE O/P EST MOD 30 MIN: CPT | Mod: S$PBB,,, | Performed by: FAMILY MEDICINE

## 2022-09-22 PROCEDURE — 99214 PR OFFICE/OUTPT VISIT, EST, LEVL IV, 30-39 MIN: ICD-10-PCS | Mod: S$PBB,,, | Performed by: FAMILY MEDICINE

## 2022-09-22 PROCEDURE — 99999 PR PBB SHADOW E&M-EST. PATIENT-LVL IV: CPT | Mod: PBBFAC,,, | Performed by: FAMILY MEDICINE

## 2022-09-22 PROCEDURE — 99999 PR PBB SHADOW E&M-EST. PATIENT-LVL IV: ICD-10-PCS | Mod: PBBFAC,,, | Performed by: FAMILY MEDICINE

## 2022-09-22 PROCEDURE — 99214 OFFICE O/P EST MOD 30 MIN: CPT | Mod: PBBFAC,PO,25 | Performed by: FAMILY MEDICINE

## 2022-09-22 PROCEDURE — G0008 ADMIN INFLUENZA VIRUS VAC: HCPCS | Mod: PBBFAC,PO

## 2022-09-22 NOTE — PROGRESS NOTES
Sophia Hope    Chief Complaint   Patient presents with    Follow-up    Hypertension       History of Present Illness:   Ms. Hope comes in today for hypertension follow-up.  She is accompanied by her niece Alessandra Lovett and grandyuli Jerez, who assist her as they state she now has ALS.  They state she lives with her grandchildren and daughter's ; they state her 39-year old daughter  2 weeks ago due to breast cancer.  She speaks very little and corresponds much of today's conversation by typing on her phone.    She has taken medications today.  She is not fasting.  She performs arm or leg stretching once a week, 10 minutes each time and monitors her diet.  She does not perform home blood pressure checks.    They state physical therapist stopped coming 2 weeks ago.  They states she received in-home physical therapy 2 times a week for 6 weeks.    She saw Dr. Scott, ophthalmologist, on 2022 for glaucoma surveillance with follow-up advised and scheduled for November 10, 2022.  She saw a neurologist in [place] on 2022 for ALS.    Otherwise, she denies having fever, chills, fatigue, appetite changes; shortness of breath, cough, wheezing; chest pain, palpitations, leg swelling; abdominal pain, nausea, vomiting, diarrhea, constipation; unusual urinary symptoms; polydipsia, polyphagia, polyuria, hot or cold intolerance; back pain; headache; anxiety, depression, homicidal or suicidal thoughts.               Labs:                       WBC                      5.57                2022                 HGB                      11.8 (L)            2022                 HCT                      37.2                2022                 PLT                      207                 2022                 CHOL                     190                 2022                 TRIG                     121                 2022                 HDL                      37 (L)               03/24/2022                 ALT                      14                  07/06/2022                 AST                      22                  07/06/2022                 NA                       139                 07/06/2022                 K                        3.8                 07/06/2022                 CL                       103                 07/06/2022                 CREATININE               1.0                 07/06/2022                 BUN                      16                  07/06/2022                 CO2                      26                  07/06/2022                 TSH                      2.459               03/24/2022                 HGBA1C                   5.3                 03/16/2021                        LDLCALC                  128.8               03/24/2022                Current Outpatient Medications   Medication Sig    allopurinoL (ZYLOPRIM) 100 MG tablet TAKE 1 TABLET(100 MG) BY MOUTH EVERY DAY    atenoloL (TENORMIN) 100 MG tablet TAKE 1 TABLET(100 MG) BY MOUTH EVERY DAY    atenoloL (TENORMIN) 50 MG tablet TAKE 1 TABLET(50 MG) BY MOUTH EVERY DAY    baclofen (LIORESAL) 10 MG tablet Take 1 tablet (10 mg total) by mouth nightly as needed. For back pain    calcium carbonate-vitamin D2 600 mg calcium- 200 unit Cap Take 1 tablet by mouth Twice daily.    COMBIGAN 0.2-0.5 % Drop INSTILL 1 DROP IN BOTH EYES TWICE DAILY    dextran 70-hypromellose (TEARS) ophthalmic solution Place 1 drop into both eyes. BOTH EYES PRN     hydroCHLOROthiazide (HYDRODIURIL) 25 MG tablet TAKE 1 TABLET(25 MG) BY MOUTH EVERY DAY    irbesartan (AVAPRO) 300 MG tablet TAKE 1 TABLET(300 MG) BY MOUTH EVERY DAY    latanoprost 0.005 % ophthalmic solution INSTILL 1 DROP IN BOTH EYES EVERY DAY AT BEDTIME    levothyroxine (SYNTHROID) 88 MCG tablet Take 1 tablet (88 mcg total) by mouth every morning.    meloxicam (MOBIC) 15 MG tablet TAKE 1 TABLET(15 MG) BY MOUTH DAILY AS NEEDED    montelukast  (SINGULAIR) 10 mg tablet TAKE 1 TABLET(10 MG) BY MOUTH EVERY DAY    pantoprazole (PROTONIX) 40 MG tablet TAKE 1 TABLET(40 MG) BY MOUTH EVERY DAY    riluzole 50 mg Tab tablet Take 1 tablet (50 mg total) by mouth every 12 (twelve) hours.       Review of Systems   Constitutional:  Positive for activity change. Negative for appetite change, chills, fatigue, fever and unexpected weight change.        Weight  92.9 kg (204 lb 14.7 oz) at March 9, 2021 visit.   HENT:          See history of present illness.   Eyes:         See history of present illness.   Respiratory:  Negative for cough, shortness of breath and wheezing.    Cardiovascular:  Negative for chest pain, palpitations and leg swelling.        See history of present illness.   Gastrointestinal:  Negative for abdominal pain, constipation, diarrhea, nausea and vomiting.   Endocrine: Negative for cold intolerance, heat intolerance, polydipsia, polyphagia and polyuria.        See history of present illness.   Genitourinary:  Negative for difficulty urinating.   Musculoskeletal:  Positive for gait problem. Negative for back pain and myalgias.        See history of present illness.   Neurological:  Positive for speech difficulty. Negative for headaches.        See history of present illness.   Psychiatric/Behavioral:  Negative for dysphoric mood and suicidal ideas. The patient is not nervous/anxious.         Negative for homicidal ideas.     Objective:  Physical Exam  Vitals reviewed.   Constitutional:       General: She is not in acute distress.     Appearance: Normal appearance. She is well-developed. She is obese. She is not ill-appearing, toxic-appearing or diaphoretic.   Neck:      Thyroid: No thyromegaly.   Cardiovascular:      Rate and Rhythm: Normal rate and regular rhythm.      Pulses: Normal pulses.      Heart sounds: Normal heart sounds. No murmur heard.  Pulmonary:      Effort: Pulmonary effort is normal. No respiratory distress.      Breath sounds: Normal  breath sounds. No wheezing.   Abdominal:      General: Bowel sounds are normal. There is no distension.      Palpations: Abdomen is soft. There is no mass.      Tenderness: There is no abdominal tenderness. There is no guarding or rebound.   Musculoskeletal:         General: Swelling present. No tenderness.      Cervical back: Normal range of motion and neck supple. No tenderness. No muscular tenderness.      Comments: She sits in wheelchair during visit today. Edema.   Lymphadenopathy:      Cervical: No cervical adenopathy.   Neurological:      Mental Status: She is alert and oriented to person, place, and time.      Comments: Speech deficit noted.   Psychiatric:         Mood and Affect: Mood normal.         Behavior: Behavior normal.         Thought Content: Thought content normal.         Judgment: Judgment normal.     ASSESSMENT:  1. Essential hypertension    2. Hypothyroidism due to acquired atrophy of thyroid    3. Low-tension glaucoma of both eyes, mild stage    4. ALS (amyotrophic lateral sclerosis)    5. Impaired gait and mobility    6. Dysarthria        PLAN:  Sophia was seen today for follow-up and hypertension.    Diagnoses and all orders for this visit:    Essential hypertension    Hypothyroidism due to acquired atrophy of thyroid    Low-tension glaucoma of both eyes, mild stage    ALS (amyotrophic lateral sclerosis)    Impaired gait and mobility    Dysarthria    Other orders  -     Influenza - Quadrivalent (Adjuvanted)      Continue current medications, follow low sodium, low cholesterol, low carb diet, daily walks.  I discussed patient and family concerns.  Keep follow up with specialists.  Follow up in about 24 weeks (around 3/9/2023) for physical.

## 2022-10-03 ENCOUNTER — CLINICAL SUPPORT (OUTPATIENT)
Dept: REHABILITATION | Facility: HOSPITAL | Age: 67
End: 2022-10-03
Attending: PSYCHIATRY & NEUROLOGY
Payer: MEDICARE

## 2022-10-03 DIAGNOSIS — R47.1 DYSARTHRIA: Primary | ICD-10-CM

## 2022-10-03 PROCEDURE — 92609 USE OF SPEECH DEVICE SERVICE: CPT | Performed by: SPEECH-LANGUAGE PATHOLOGIST

## 2022-10-03 NOTE — PROGRESS NOTES
"[facility]  Speech Therapy Treatment Note- {Speech Eval:17812}  Date: 10/3/2022     Name: Sophia Hope   MRN: [id number]   Therapy Diagnosis: No diagnosis found.Physician: Xi Frank,*  Physician Orders: ***  Medical Diagnosis: ***    Visit #/ Visits Authorized: ***/ ***  Date of Evaluation:  ***  Insurance Authorization Period: ***  Plan of Care Expiration Date:    ***  Extended Plan of Care:  ***   Progress Note: ***   Visits Cancelled: ***  Visits No Show: ***    Time In:  ***  Time Out:  ***  Total Billable Time: ***     Precautions: {IP WOUND PRECAUTIONS OHS:64247}  Subjective:   Patient reports: ***   She {WAS WAS NOT:24905} compliant to home exercise program.   Response to previous treatment: ***   Pain Scale:  {0-10:78030::"0"}/10 on a Visual Analog Scale currently.   Pain Location: {right/left:08427}  Objective:   TIMED  Procedure Min.   {cognitive therapy:94830}  ***   {cognitive therapy:89402}  ***         UNTIMED  Procedure Min.   {CPT ST Treat:31658}  ***   {CPT ST Treat:09712}  ***   Total Timed Units: ***  Total Untimed Units: ***  Charges Billed/Number of units: ***    Short Term Goals: (*** {WEEKS/MONTHS EC:18498}) Current Progress:   ***    Progressing/ Not Met 10/3/2022   ***    ***     Progressing/ Not Met 10/3/2022   ***    ***     Progressing/ Not Met 10/3/2022   ***    ***     Progressing/ Not Met 10/3/2022   ***    ***     Progressing/ Not Met 10/3/2022   ***    ***     Progressing/ Not Met 10/3/2022   ***    ***     Progressing/ Not Met 10/3/2022   ***      Patient Education/Response:   ***    Home program established: {HEP:99136}  Exercises were reviewed and Sophia was able to demonstrate them prior to the end of the session.  Sophia demonstrated {Desc; good/fair/poor:67571} understanding of the education provided.     See Electronic Medical Record under Patient Instructions for exercises provided throughout therapy.  Assessment:   Sophia {IS / IS NOT:79729} " progressing well towards her goals. *** Current goals remain appropriate. Goals to be updated as necessary.     Patient prognosis is {REHAB PROGNOSIS OHS:07826}. Patient will continue to benefit from skilled outpatient speech and language therapy to address the deficits listed in the problem list on initial evaluation, provide patient/family education and to maximize patient's level of independence in the home and community environment.   Medical necessity is demonstrated by the following IMPAIRMENTS:  ***  Barriers to Therapy: ***  Patient's spiritual, cultural and educational needs considered and patient agreeable to plan of care and goals.***  Plan:   Continue Plan of Care with focus on ***    CHRISTINA Valle   10/3/2022

## 2022-10-03 NOTE — PROGRESS NOTES
[facility]  Speech Therapy Treatment Note-  [facility]  Date: 10/3/2022     Name: Sophia Hope   MRN: [id number]   Therapy Diagnosis:   Encounter Diagnosis   Name Primary?    Dysarthria Yes   Physician: Xi Frank,*  Physician Orders: Speech Pathology  Medical Diagnosis: Amyotrophic lateral sclerosis [G12.21], Dysarthria and anarthria [R47.1]    Visit #/ Visits Authorized: 1/ 20 (+eval)  Date of Evaluation:  8/9/22  Insurance Authorization Period: 10/3/22-12/31/22  Plan of Care Expiration Date:    12/27/22  Extended Plan of Care:  -   Progress Note: 11/3/22   Visits Cancelled: 0  Visits No Show: 0    Time In:  9:00 AM  Time Out:  9:55 AM  Total Billable Time: 55 mins     Precautions: Standard, Weightbearing, and ALS, Communication  Subjective:   Patient reports: presents on time with her son and grandson. She reports her daughter has passed since initial SLP evaluation.    She was compliant to home exercise program.     Response to previous treatment: first treatment session     Pain Scale:  0/10 on a Visual Analog Scale currently.   Pain Location: NA  Objective:   TIMED  Procedure Min.   Cognitive Therapeutic Interventions, first 15 minutes CPT 50196  0   Cognitive Therapeutic Interventions, each additional 15 minutes CPT 30463  0         UNTIMED  Procedure Min.   AAC Therapy  55   Total Timed Units: 0  Total Untimed Units: 1  Charges Billed/Number of units: 14617/1    Short Term Goals: (10 weeks) Current Progress:   Patient will communicate personal and medical needs, feelings, and opinions and make requests to familiar and unfamiliar communication partners independently with on 9/10 attempts.     Progressing/ Not Met 10/3/2022   Not formally addressed      Patient will greet and initiate conversation independently 5x per session     Progressing/ Not Met 10/3/2022   Not formally addressed      Given a specific message to find, patient will independently navigate to the correct page on 8/10  "attempts.      Progressing/ Not Met 10/3/2022   Patient located and navigated to located correct messages with moderate verbal/visual cues and 90% accuracy. (I.e., "if someone asked you what your name is, what would you say" and patient located button for "My name is Sophia").      Patient will demonstrate comprehension of basic maintenance and operations (on-off, adjusting volume) of the SGD on 5/5 attempts.     Progressing/ Not Met 10/3/2022   Patient demonstrated comprehension of basic on-off functions, adjusting volume with 90% accuracy and moderate verbal and visual cues. Will continue to address.     Patient and family will be educated on basic editing, use, functioning of device with device set up with 90% accuracy and minimal assistance.      Goal Met 10/3/2022   Patient and family educated on device use, basic setup, and primarily on steps to edit buttons/pages, etc. Patient's son and grandson demonstrated education as they edited buttons as well as patient with 90% accuracy and minimal-moderate verbal cues. Upon session completion, basic functions and phrases established and patient able to communicate basic wants/needs.       Patient Education/Response:   Patient and family educated on device use, basic setup, and primarily on steps to edit buttons/pages, etc. Patient's son and grandson demonstrated education as they edited buttons as well as patient with 90% accuracy and minimal-moderate verbal cues. Upon session completion, basic functions and phrases established and patient able to communicate basic wants/needs.      Home program established: yes-editing buttons, pages   Exercises were reviewed and Sophia was able to demonstrate them prior to the end of the session.  Sophia demonstrated good  understanding of the education provided.     See Electronic Medical Record under Patient Instructions for exercises provided throughout therapy.  Assessment:   Sophia is progressing well towards her goals. Patient " and family educated on basic device functions and device set-up with patient's information for communication of simple wants and needs. Will continue to address use of device for answering questions/conversation in upcoming sessions. Current goals remain appropriate. Goals to be updated as necessary.     Patient prognosis is Good. Patient will continue to benefit from skilled outpatient speech and language therapy to address the deficits listed in the problem list on initial evaluation, provide patient/family education and to maximize patient's level of independence in the home and community environment.     Medical necessity is demonstrated by the following IMPAIRMENTS:  Patient's dysarthria makes her dependent on communication device for 100% of communication needs.     Barriers to Therapy: NA  Patient's spiritual, cultural and educational needs considered and patient agreeable to plan of care and goals.  Plan:   Continue Plan of Care with focus on independent use/management of device for communication.     Fany Johnson MA, CCC-SLP  Speech Language Pathologist  10/3/2022

## 2022-10-11 ENCOUNTER — DOCUMENTATION ONLY (OUTPATIENT)
Dept: PALLIATIVE MEDICINE | Facility: HOSPITAL | Age: 67
End: 2022-10-11
Payer: MEDICARE

## 2022-10-11 NOTE — PROGRESS NOTES
[facility]  PALLIATIVE MEDICINE  Medical Specialty       Patient Name: Sophia Hope  MRN: [id number]  Primary Care Physician: MD Portia Dowling with [facility] reached out as pt's family contacted them to sign up for services. Release form received and records faxed to [phone].    ALIYAH Alcazar, [facility]  [phone]

## 2022-10-18 DIAGNOSIS — J30.2 SEASONAL ALLERGIC RHINITIS, UNSPECIFIED TRIGGER: ICD-10-CM

## 2022-10-18 RX ORDER — MONTELUKAST SODIUM 10 MG/1
TABLET ORAL
Qty: 90 TABLET | Refills: 3 | Status: SHIPPED | OUTPATIENT
Start: 2022-10-18

## 2022-10-18 NOTE — TELEPHONE ENCOUNTER
No new care gaps identified.  [facility] Embedded Care Gaps. Reference number: [id number]. 10/18/2022   8:08:37 AM THOMAST

## 2022-10-18 NOTE — TELEPHONE ENCOUNTER
Refill Decision Note   Sophia Jayy  is requesting a refill authorization.  Brief Assessment and Rationale for Refill:  Approve     Medication Therapy Plan:       Medication Reconciliation Completed: No   Comments:     No Care Gaps recommended.     Note composed:3:02 PM 10/18/2022

## 2022-10-25 ENCOUNTER — CLINICAL SUPPORT (OUTPATIENT)
Dept: REHABILITATION | Facility: HOSPITAL | Age: 67
End: 2022-10-25
Attending: PSYCHIATRY & NEUROLOGY
Payer: MEDICARE

## 2022-10-25 DIAGNOSIS — R47.1 DYSARTHRIA: Primary | ICD-10-CM

## 2022-10-25 PROCEDURE — 92507 TX SP LANG VOICE COMM INDIV: CPT | Performed by: SPEECH-LANGUAGE PATHOLOGIST

## 2022-10-25 NOTE — PROGRESS NOTES
[facility]   Physical Therapy Evaluation     at [facility]     Date: 10/26/2022    Name: Sophia Hope   MRN: [id number]    Therapy Diagnosis:   Encounter Diagnoses   Name Primary?    ALS (amyotrophic lateral sclerosis) Yes    Dysarthria     Oropharyngeal dysphagia     Impaired gait and mobility     Impaired mobility and ADLs     Spasticity     Chronic midline low back pain without sciatica     Spondylosis of lumbar region without myelopathy or radiculopathy     Primary osteoarthritis of both knees     Primary osteoarthritis of left shoulder     Gait disorder     Nutritional assessment     Chronic neuromuscular respiratory failure     Drug therapy       Physician: Dr. Jose Drake and Dr. Xi Frank  Physician Orders: Ambulatory Referral for Physical Therapy at ALS Clinic  Plan of Care Expiration: Evaluation Only  Medical Diagnosis: ALS (G12.21)     Initial Clinic Date: 4/13/2022  ALS Clinic Number: [id number]    Time In: 1037  Time Out: 1110  Total Billable Time: 16 minutes     Precautions: Standard and Fall    SUBJECTIVE   Patient reports: Patient's verbal communication is limited due to progression of disease. She utilized gestures and texting on her phone to communicate. Patient's family members assisted in providing report today. Patient's son reports she is primarily wheelchair bound at this time. Her family is assisting with nearly all aspects of self care including bathing, dressing and meal preparation.    History of Present Illness: Sophia is a 67 y.o. female that presents to [facility] secondary to dx of bulbar upper motor neuron disease. Symptom onset was April of 2021 with primarily speech and swallowing deficits.     Medical History:   Past Medical History:   Diagnosis Date    Arthritis     Benign colon polyp     Cataract     Diverticulosis     Glaucoma (increased eye pressure)     History of abnormal Pap smear     History  "of herpes zoster     Right flank 4/30/12    Hypertension     Hypothyroidism     Osteopenia     Postmenopausal      Surgical History:   Sophia Hope  has a past surgical history that includes Cervix lesion destruction; hysteroscopic ablation; pr dilation/curettage,diagnostic; BTL; Tonsillectomy; PCIOL (Right, 07/25/2018); I-STENT (Right, 07/25/2018); Cataract extraction w/  intraocular lens implant (Left, 12/19/2018); Cataract extraction w/  intraocular lens implant (Right, 07/25/2018); and Colonoscopy (N/A, 5/29/2019).  Medications:   Sophia has a current medication list which includes the following prescription(s): allopurinol, atenolol, atenolol, baclofen, calcium carbonate-vitamin d2, combigan, dextran 70-hypromellose, hydrochlorothiazide, irbesartan, latanoprost, levothyroxine, meloxicam, montelukast, pantoprazole, and riluzole.  Allergies:   Review of patient's allergies indicates:   Allergen Reactions    Clonidine      Other reaction(s): Rash    Cardizem [diltiazem hcl] Rash     Years ago per patient        Prior or current therapy: Patient has previously participated in home health physical therapy; however, she reports these services have been discharged.   [x] denies current home health services or hospice care    Family Present at time of screen: Son and niece   Social History: Since last ALS clinic, patient has moved into her son's home. Lives with son and 2 teenage grandchildren.   Home Environment: single level home with no step/stairs/threshold to enter   DME: rollator and walker, adjustable bed, shower chair, manual wheelchair      Prior Level of Function: independence   Occupation: retired; hobbies include "relaxing and watching TV"  Exercise Routine / History: None reported    Current Level of Function: Uses manual wheelchair for household and community mobility; requires assistance from family with wheelchair propulsion due to size/weight of chair, upper extremity weakness, and limited " "endurance. Patient requires assistance from family for all transfers and most ADLs.      Falls: none since last clinic      Pain:  0/10  *no pain reported today, but patient does indicate that she has left knee pain on occasion (Niece indicates this is secondary to arthritis)     Patient concerns: obtain therapy recommendations for equipment and diagnosis specifics education       OBJECTIVE   Patient presents to clinic: manual wheelchair    Functional Mobility and Wheelchair Assessment at ALS clinic    Sophia was referred by Dr. Jose Drake today for a functional mobility and custom wheelchair assessment due to a need for power mobility for safe mobility in home and community.   Pt's goals: Obtain power wheelchair for independent mobility in home and community.   Caregiver goals and specific limitations that may affect care: none identified at this time      See face-sheet for patient contact and insurance information     Patient height: 5'6"   Patient weight:   Wt Readings from Last 1 Encounters:   07/06/22 82.1 kg (181 lb)      Is this a progressive disease? yes  Any recent weight changes or trends? No  Relevant future surgeries: No  Cardio status: impaired - moderate  Respiratory status: impaired - moderate   Does this patient have an amputation: No   Orthotic use: No    HOME ENVIRONMENT  Living environment: single family home single story home  Social support: lives with their family   Hours without assistance: 8+  Home is accessible to patient: yes, WC/handicap accessible  Storage of wheelchair: in home     COMMUNITY  Transportation: car  Does patient sit in wheelchair during transport? no   Self-?  no  Employment and/or School - specific requirements related to mobility needs: none identified     COMMUNICATION   Verbal communication: Difficult to understand  Language - primary:  English  Language - secondary: n/a   Communication provided by patient and family    CURRENT SEATING / MOBILITY: "   Current Mobility Device: manual wheelchair  , model, serial number and specs listed below if available.   Unknown   Age of current device (years): unknown   Purchased by non-insurance - self purchase / donated  Current condition of mobility base: no longer appropriate due to significant change in medical status resulting in new impairments and functional status change  Current seating system: no longer appropriate due to significant change in medical status resulting in new impairments and functional status change  Age of seating system, if different from base (years): N/A  Describe posture in present seating system: Patient demonstrates sacral sitting with excess thoracic kyphosis and forward head.   Is the current mobility device meeting medical necessity? No Explain: Patient is presently unable to propel manual wheelchair due to size/weight of wheelchair, decreased upper extremity strength, and limited endurance. She is unable to effectively or independently perform pressure reliefs while seated in her present wheelchair. Due to progressive nature of ALS, she requires a chair with capacity for adjustments in drive controls and power tilt/recline functionality       MOBILITY / BALANCE  Sitting Balance Standing Balance Transfers Ambulation   Fair: Patient able to maintain balance with handhold support; may require occasional minimal assistance. Poor: Patient requires handhold support and moderate to maximal assistance to maintain position maximal assist non-functional ambulator - history/high risk of falls   Fall History:   Number of falls in last 6 months: 0  Number of near falls in last 6 months: 0 Transfer method: stand pivot     Ability to complete Mobility-Related Activities of Daily Living (MRADL's) with CURRENT Mobility Device  Move room to room total assist MRADL's Comments: Pt. Is not a functional Ambulator    Meal preparation total assist    Feeding independent with increased time    Bathing  total assist    Grooming total assist    Upper Extremity Dressing minimal assist    Lower Extremity Dressing minimal assist    Toileting independent with increased time    Bowel Management: continent   Bladder Management: continent        Current Functional Mobility Equipment Skills     Cane/Crutches Does not meet mobility needs due to:, Risk of falling or History of falls, Safety concerns with physical ability, Decreased / limitations in endurance & strength, Pace / Speed, and Cardiac and/or respiratory condition   Walker / Rollator Does not meet mobility needs due to:, Risk of falling or History of falls, Safety concerns with physical ability, Decreased / limitations in endurance & strength, Pace / Speed, and Cardiac and/or respiratory condition   Manual Wheelchair - Does not meet mobility needs due to:, Safety concerns with physical ability, Decreased / limitations in endurance & strength, Pace / Speed, and Cardiac and/or respiratory condition   Manual Wheelchair with Power Assist () Does not meet mobility needs due to:, Safety concerns with physical ability, and Decreased / limitations in endurance & strength   Scooter Does not meet mobility needs due to:, Risk of falling or History of falls, and Safety concerns with physical ability   Power Wheelchair: standard joystick Not applicable   Power Wheelchair: alternative controls Meets needs for safe Independent functional ambulation / mobility   Summary: least costly alternative for independent functional mobility was found to be: power wheelchair with alternative control     Functional Processing Skills for Wheeled Mobility        Processing skills are adequate for safe mobility: yes  Patient is willing and motivated to use recommended mobility equipment: yes  Patient is UNABLE to safely operate mobility equipment independently and requires dependent care mobility: no         SENSATION and SKIN ISSUES    Sensation: intact Location(s) of sensation  impairment: N/A   Pressure Relief Methods: unable Effective pressure relief method(s) can be performed consistently throughout the day: no; Patient is unable to effectively and independently change positions in wheelchair or stand for regular performance of pressure reliefs.      Skin Issues/Skin Integrity - Current skin issues:  No, intact         History of skin issues:   No   History of skin flap surgeries:   No   PAIN  Patient is unable to quantify, but does report intermittent pain in right knee.    How does pain interfere with mobility and/or MRADLs? N/A       MAT EVALUATION    Neuro-Muscular Status (tone, reflexive, responses, etc.): Intact      Pelvis     posterior; tendency away from neutral       WFL       WFL      Tonal Influence at Pelvis: Normal   Trunk     increased thoracic kyphosis; tendency away from neutral       WFL        neutral       Tonal Influence at Trunk: Normal   Head & Neck flexed Limited head control Tone/Movement of head and neck comments: Patient with decreased endurance/strength in neck musculature.       Hips     neutral         neutral   Tone/Movement of lower extremities:  Normal  Edema: pitting  Location: bilateral ankles         Lower Extremity Passive Range of Motion     ROM   Hip Flexion WFLs     Hip Extension WFLs   Hip Abduction WFLs   Hip Adduction WFLs   Knee Extension WFLs   Knee Flexion    Ankle Dorsiflexion limited as follows: ankle dorsiflexion passive range of motion  limited to -10 degrees bilaterally    Ankle Plantarflexion        Lower Extremity Strength  Right LE  Left LE    Hip flexion: 2/5 Hip flexion: 3-/5   Hip extension:  3/5 Hip extension: 3/5   Hip abduction: 3-/5 Hip abduction: 3-/5   Hip adduction: 3/5 Hip adduction 3/5   Knee extension: 4/5 Knee extension: 4/5   Knee flexion: 3/5 Knee flexion: 3/5   Ankle dorsiflexion: 3-/5 Ankle dorsiflexion: 2-/5   Ankle plantarflexion: 2/5 Ankle plantarflexion: 2/5         Upper Extremity Shoulder Posture:  Functional  -bilateral    Tone/Movement of upper extremities:   Normal     Edema: none  Location: n/a            Wrist & Hand Handedness: right    Hand Limitations:     Limitations -bilateral  Weak grasp -bilateral  Poor dexterity -bilateral            Upper Extremity Range of Motion      ROM   Shoulder Flexion limited as follows: 135   Shoulder Abduction limited as follows: 135   Shoulder Adduction  WFLs   Elbow Flexion WFLs   Elbow Extension WFLs   Wrist Flexion    WFLs   Wrist Extension WFLs   Pinch Strength NT    Strength NT      Upper Extremity Strength  (R) UE   (L) UE     Shoulder flexion: 3/5 Shoulder flexion: 3/5   Shoulder Abduction: 3/5 Shoulder abduction: 3/5   Shoulder Abduction: 3/5 Shoulder Abduction: 3/5   Elbow flexion: 3/5 Elbow flexion: 3/5   Elbow extension: 4/5 Elbow extension: 4/5   Wrist flexion: 4/5 Wrist flexion: 4/5   Wrist extension: 4/5 Wrist extension: 4/5       Mobility Base Recommendations and Justification    Mobility Base Recommendation: Power mobility base  Justification for decision: is not a safe, functional ambulator, limitation prevents from completing a MRADL(s) within a reasonable timeframe, limitation places at high risk of morbidity or mortality secondary to the attempts to perform a MRADL(s), limitation prevents accomplishing a MRADL(s) entirely, provide independent mobility, equipment is a lifetime medical need, walker or cane inadequate, any type manual wheelchair inadequate , and scooter/POV inadequate  Number of Hours per day in above selected mobility base: 8+    Component Recommendations and Justification  Should include but not be limited to:   POWER MOBILITY    [] Scooter/POV  [] can safely operate   [] can safely transfer   [] has adequate trunk stability   [] functionally propel manual wheelchair    [x] Power mobility base  [] non-ambulatory   [x] cannot functionally propel manual wheelchair   [x] cannot functionally and safely operate scooter/POV   [x] can safely operate  power wheelchair   [] home is accessible   [x] willing to use power wheelchair    Tilt   [x] Powered tilt on powered chair   [] Powered tilt on manual chair   [] Manual tilt on manual chair   Comments:  [x] change position for pressure relief/cannot weight shift  [x] change position against gravitational force on head and shoulders   [] decrease pain   [] blood pressure management   [] control autonomic dysreflexia   [] decrease respiratory distress   [] management of spasticity   [] management of low tone   [] facilitate postural control   [x] rest periods   [] control edema   [] increase sitting tolerance   [] aid with transfers    Recline   [x] Power recline on power chair   [] Manual recline on manual chair   Comments:  [] intermittent catheterization   [] manage spasticity   [] accommodate femur to back angle   [] change position for pressure relief/cannot weight shift   [] high risk of pressure sore development   [x] tilt alone does not accomplish effective pressure relief   [] difficult to transfer to and from bed  [] rest periods and sleeping in chair   [] repositioning for transfers   [] bring to full recline for ADL care   [] clothing/diaper changes in chair   [] gravity PEG tube feeding   [] head positioning   [] decrease pain   [] blood pressure management   [] control autonomic dysreflexia   [] decrease respiratory distress   [] user on ventilator    Elevator on mobility base   [] Power wheelchair   [] Scooter  [] increase Indep in transfers   [] increase Indep in ADLs   [] bathroom function and safety   [] kitchen/cooking function and safety   [] shopping   [] raise height for communication at standing level   [] raise height for eye contact which reduces cervical neck strain and pain   [] drive at raised height for safety and navigating crowds    [] Vertical position system (anterior tilt) (Drive locks-out)   [] Stand (Drive enabled)  [] independent weight bearing   [] decrease joint contractures   []  decrease/manage spasticity   [] decrease/manage spasms   [] pressure distribution away from scapula, sacrum, coccyx, and ischial tuberosity   [] increase digestion and elimination   [] access to counters and cabinets   [] increase reach   [] increase interaction with others at eye level, reduces neck strain   [] increase performance of MRADL(s)    Power elevating legrest   [x] Center mount (Single)  degrees  [] Standard (Pair) 100-170 degrees [x] position legs at 90 degrees, not available with std power ELR   [x] center mount tucks into chair to decrease turning radius in home, not available with std power ELR   [x] provide change in position for LE   [x] elevate legs during recline   [x] maintain placement of feet on footplate  [] decrease edema   [] improve circulation   [x] actuator needed to elevate legrest   [x] actuator needed to articulate legrest preventing knees from flexing   [x] Increase ground clearance over curbs  [] STD (pair) independently elevate legrest   POWER WHEELCHAIR CONTROLS    Controls/input device   [] Right Hand  [] Left Hand  [x] Expandable   [] Non-expandable   [] Proportional   [] Non-proportional/switches/ head-array   [] Electrical/proximity  [] Mechanical    [x] provides access for controlling wheelchair   [] programming for accurate control   [x] progressive disease/changing condition   [x] required for alternative drive controls   [] lacks motor control to operate proportional drive control   [] unable to understand proportional controls   [] limited movement/strength   [] extraneous movement / tremors / ataxic / spastic   [x] Upgraded electronics controller/harness   [] Single power (tilt or recline)   [x] Expandable   [] Non-expandable plus   [] Multi-power (tilt, recline, power legrest, power seat lift, vertical positioning system, stand)  [x] allows input device to communicate with drive motors   [x] harness provides necessary connections between the controller, input  device, and seat functions   [x] needed in order to operate power seat functions through joystick/ input device   [x] required for alternative drive controls    [x] Enhanced display [x] required to connect all alternative drive controls   [x] required for upgraded joystick (lite-throw, heavy duty, micro)  [x] Allows user to see in which mode and drive the wheelchair is set; necessary for alternate controls    [] Upgraded tracking electronics [] correct tracking when on uneven surfaces   [] makes switch driving more efficient and less fatiguing   [] increase safety when driving  [] increase ability to traverse thresholds    [x] Safety / reset / mode switches   Type:  [x] Used to change modes and stop the wheelchair when driving    [x] Mount for joystick / input device/switches  [] swing away for access or transfers  [x] attaches joystick / input device / switches to wheelchair  [] provides for consistent access  [] midline for optimal placement   [] Attendant controlled joystick plus mount  [] safety   [] long distance driving   [] operation of seat functions   [] compliance with transportation regulations    [x] Battery  [x] required to power (power assist / scooter/ power wc / other):    [] Power inverter (24V to 12V) [] required for ventilator / respiratory equipment / other:       CHAIR OPTIONS MANUAL & POWER   Armrests   [] adjustable height [] removable [] swing away[] fixed   [] flip back [] reclining   [] full length pads [] desk [] tube arms   [] gel pads  [] provide support with elbow at 90  [] remove/flip back/swing away for transfers   [] provide support and positioning of upper body   [] allow to come closer to table top   [] remove for access to tables   [] provide support for w/c tray   [] change of height/angles for variable activities   [] Elbow support / Elbow stop  [] keep elbow positioned on arm pad   [] keep arms from falling off arm pad during tilt and/or recline    [x] Upper Extremity Support    [] Arm trough    [] Right [] Left [] Bilateral  Style:   [] swivel mount [] fixed mount   [] posterior hand support   [] ½ tray   [] full tray -joystick cut out  [] Right [] Left [] Bilateral  Style:  [x] decrease gravitational pull on shoulders   [x] provide support to increase UE function   [x] provide hand support in natural position   [] position flaccid UE   [] decrease subluxation   [] decrease edema   [] manage spasticity   [] provide midline positioning   [] provide work surface   [] placement for AAC/Computer/EADL   Hangers/ Legrests   [] ____ degree   [] elevating   [] articulating   [] swing away   [] fixed   [] lift off   [] heavy duty   [] adjustable knee angle   [] adjustable calf panel   [] longer extension tube  [] provide LE support   [] maintain placement of feet on footplate  [] accommodate lower leg length   [] accommodate to hamstring tightness   [] enable transfers   [] provide change in position for LE's   [] elevate legs during recline   [] decrease edema   [] durability    Foot support   [x] footplate   [] Right [] Left [x] Bilateral  [x] flip up  [] depth adjustable   [] angle adjustable  [] foot board/one piece  [x] provide foot support   [] accommodate to ankle ROM   [] allow foot to go under wheelchair base   [x] enable transfers    [] Shoe holders  [] position foot   [] decrease / manage spasticity   [] control position of LE   [] stability   [] safety    [] Ankle strap/heel loops  [] support foot on foot support   [] decrease extraneous movement   [] provide input to heel   [] protect foot   [] Amputee adapter   [] Right [] Left [] Bilateral  Style: ___ Size: ___  [] Provide support for stump/residual extremity   [x] Transportation tie-down [x] to provide crash tested tie-down brackets    [] Crutch/cane gibbons   [] O2 gibbons   [] IV   [] Ventilator tray/mount  [] stabilize accessory on wheelchair   [x] Seat cushion  [] accommodate impaired sensation   [] decubitus ulcers  present or history   [x] unable to shift weight   [x] increase pressure distribution   [] prevent pelvic extension   [x] stabilize/promote pelvis alignment   [] stabilize/promote femur alignment   [] accommodate obliquity   [] accommodate multiple deformity   [] incontinent/accidents   [x] low maintenance   [x] seat farhat   [] fixed   [] removable [x] attach seat platform/cushion to wheelchair frame   [] Seat wedge  [] provide increased aggressiveness of seat shape to decrease sliding down in the seat   [] accommodate ROM    [] Cover replacement  [] protect back or seat cushion   [] incontinent/accidents   [] Solid seat / insert  [] support cushion to prevent hammocking   [] allows attachment of cushion to mobility base    [] Lateral pelvic/thigh/hip support (Guides)  [] decrease abduction   [] accommodate pelvis   [] position upper legs   [] accommodate spasticity   [] removable for transfers     [] Lateral pelvic/thigh supports farhat  [] fixed   [] swing-away   [] removable   [] farhat lateral pelvic/thigh supports   [] farhat lateral pelvic/thigh supports swing-away or removable for transfers   [] Medial thigh support ([place]mel)  [] decrease adduction   [] accommodate ROM   [] alignment  [] remove for transfers     [] Medial thigh support farhat   [] fixed   [] swing-away  [] removable   [] farhat medial thigh supports  [] farhat medial supports swing- away or removable for transfers   [] Back  [x] provide posterior trunk support   [] provide lumbar/sacral support   [] support trunk in midline  [] provide lateral trunk support   [] accommodate or decrease tone   [] facilitate tone   [] accommodate deformity   [] custom required off-the-shelf back support will not accommodate deformity    [x] Back farhat  [] fixed   [] removable [x] attach back rest/cushion to wheelchair frame   [] Lateral trunk supports    [] Right [] Left [] Bilateral [] decrease lateral trunk leaning   [] accommodate asymmetry   [] contour  for increased contact   [] safety   [] control of tone    [] Lateral trunk supports farhat  [] fixed   [] swing-away   [] removable [] farhat lateral trunk supports   [] farhat lateral trunk supports swing away or removable for transfers   [] Anterior chest strap, vest  [] decrease forward movement of shoulder   [] decrease forward movement of trunk   [] safety/stability   [] added abdominal support   [] trunk alignment   [] assistance with shoulder control   [] decrease shoulder elevation    [x] Headrest  [x] provide posterior head support   [] provide posterior neck support   [x] provide lateral head support   [] provide anterior head support   [x] support during tilt and recline   [] improve feeding   [] improve respiration   [] placement of switches   [x] safety   [] accommodate ROM   [] accommodate tone   [] improve visual orientation    [x] Headrest mounting hardware  [] fixed   [] removable   [] flip down   [] swing-away laterals/switches [x] mount headrest   [] farhat headrest flip down or removable for transfers  [] mount headrest swing-away laterals   [] mount switches    [] Neck Support  [] decrease neck rotation   [] decrease forward neck flexion    [] Pelvic Positioner   [] std hip belt   [] padded hip belt   [] dual pull hip belt   [] four point hip belt  [] stabilize tone   [] decrease falling out of chair   [] prevent excessive extension   [] special pull angle to control rotation   [] pad for protection over lesli prominence   [] promote comfort    [] Essential needs bag/pouch  [] medicines [] special food [] orthotics [] clothing changes   [] diapers [] catheter/hygiene [] ostomy supplies             The above equipment has a life- long use expectancy. Growth and changes in medical and/or functional conditions would be the exceptions.    *This functional mobility and wheelchair assessment form has been adapted with permission from [employer].      (paste following sign off and signature line  at end of note)     JUS JONES, PT  10/26/2022  License Number: [id number]   Therapist contact phone number: [phone]   As the evaluating therapist, I hereby attest that I have personally completed this evaluation and that I am not an employee of or working under contract to the (s) or the provider(s) of the durable medical equipment recommended in my evaluation. I further attest that I have not and will not receive remuneration of any kind from the (s) or the durable medical equipment provider(s) for the equipment I have recommended with this evaluation.     The following ATP was present and participated in this evaluation:   Chris Mtz, ATP from [employer].   Vendor phone: [phone]      I concur with the above findings and recommendations of the therapist:   Physician: Dr. Jose Drake      Physician signature:___________________________________   date: ___________      Treatment    No Treatment Provided today.     Education Provided     Education provided re:   - Patient was provided with education re: goals and plan of care for physical therapy in ALS multidisciplinary clinic   - Educated pt on purpose of PT services now and in the future to assist with mobility training and adaptation education as the ALS disease process progresses.   - Provided with recommendation for custom power wheelchair evaluation and discussed process of obtaining power wheelchair   - Provided with recommendation for  occupational therapy to provide home modifications recommendations and training in use of Yolanda lift  - Provided with verbal instructions for (caregiver assisted) passive ankle stretching and positioning of lower extremity  elevation for edema management     Sophia and her family verbalized good understanding of education provided. Pt has no cultural, educational or language barriers to learning provided.    Written Home Exercises Provided: yes; Exercises were reviewed and  patient verbalized good  understanding of the education provided. See EMR under Patient Instructions for exercises provided.    ASSESSMENT    Sophia is a 67 y.o. female referred to outpatient Physical Therapy with a medical diagnosis of ALS. Pt presents with decreased lower extremity strength since last clinic and is now dependent on manual wheelchair for all mobility. She is limited in ability to propel her manual wheelchair due to size/weight, decreased upper extremity strength and limited endurance. Patient is reliant on her family for assistance with all transfers and most ADLs at this time.     During clinic today a custom power wheelchair evaluation was performed. This mobility device is necessary for the patient due to decreased access to her home and community environment. Power tilt and recline features are both required to allow patient to independently perform pressure relief, decreased pain, and decrease frequency of tranfers into and out of chair for ADLs or pain relief. Power elevating leg rest are necessary to accomodate for swelling and improve circulation in bilateral lower extremities as patient was noted to have pitting edema in bilateral lower extremities. Patient also requires a padded hip belt to aid in maintaining optimal pelvic positioning and decrease potential for falling out of the chair. This custom power wheelchair is a lifetime medical necessity and will provide independent means of mobility throughout her home and community therefore increasing her quality of life and decreasing caregiver burden.          Equipment recommendations:    jeremy lift with hygiene sling and custom power wheelchair      Order/referral recommendations:    home health occupational therapy for home recommendations and jeremy lift training      Pt prognosis is Fair.     Plan of care discussed with patient: Yes  Pt's spiritual, cultural and educational needs considered and patient is agreeable to the plan of  care.    Anticipated Barriers for therapy: Progressive nature of ALS     Medical Necessity is demonstrated by the following  History  Co-morbidities and personal factors that may impact the plan of care Co-morbidities: see above     Personal Factors:   no deficits     high   Examination  Body Structures and Functions, activity limitations and participation restrictions that may impact the plan of care Body Regions:   neck  lower extremities  trunk    Body Systems:    gross symmetry  ROM  strength  edema  posture    Activity limitations:   Learning and applying knowledge  no deficits    General Tasks and Commands  no deficits    Communication  communicating with/receiving spoken language    Mobility  lifting and carrying objects  walking  moving around using equipment (WC)  driving (bike, car, motorcycle)    Self care  washing oneself (bathing, drying, washing hands)  caring for body parts (brushing teeth, shaving, grooming)  toileting  dressing    Domestic Life  shopping  cooking  doing house work (cleaning house, washing dishes, laundry)  assisting others    Life Areas  employment    Community and Social Life  community life  recreation and leisure         high   Clinical Presentation evolving clinical presentation with changing clinical characteristics moderate   Decision Making/ Complexity Score: moderate       Plan   Plan of care Certification: 10/26/2022 Evaluation Only    Pt to continue to follow up with referring provider and ALS clinic at physician recommended intervals.       JUS JONES, PT

## 2022-10-25 NOTE — PROGRESS NOTES
"[facility]  Speech Therapy Treatment Note/ Discharge Summary-  AAC  Date: 10/25/2022     Name: Sophia Hope   MRN: [id number]   Therapy Diagnosis:   Encounter Diagnosis   Name Primary?    Dysarthria Yes   Physician: Xi Frank,*  Physician Orders: Speech Pathology  Medical Diagnosis: Amyotrophic lateral sclerosis [G12.21], Dysarthria and anarthria [R47.1]    Visit #/ Visits Authorized: 2/ 20 (+eval)  Date of Evaluation:  8/9/22  Insurance Authorization Period: 10/3/22-12/31/22  Plan of Care Expiration Date:    12/27/22  Extended Plan of Care:  -   Progress Note: 11/3/22   Visits Cancelled: 0  Visits No Show: 0    Time In:  9:35 AM  Time Out:  10:17 AM  Total Billable Time: 42 mins     Precautions: Standard, Weightbearing, and ALS, Communication  Subjective:   Patient reports: presents on time with her son. She reports she is doing well and utilization of AAC device is going "so-so". Patient's son reported that primary concern/question is in regards to ability to use keyboard to type messages on the device. Otherwise, they have been able to customize all personal information on the device.     She was compliant to home exercise program.     Response to previous treatment: using device to communicate independently at home, customizing independently     Pain Scale:  0/10 on a Visual Analog Scale currently.   Pain Location: NA  Objective:   TIMED  Procedure Min.   Cognitive Therapeutic Interventions, first 15 minutes CPT 71397  0   Cognitive Therapeutic Interventions, each additional 15 minutes CPT 41404  0         UNTIMED  Procedure Min.   AAC Therapy  42   Total Timed Units: 0  Total Untimed Units: 1  Charges Billed/Number of units: 77555/1    Short Term Goals: (10 weeks) Current Progress:   Patient will communicate personal and medical needs, feelings, and opinions and make requests to familiar and unfamiliar communication partners independently with on 9/10 attempts.     Goal Met " "10/25/2022   Patient communicated personal information (I.e. name, birthday, birthplace), likes/ dislikes (I.e. foods, sports teams), opinions, family dynamic, and made requests with 95% accuracy. Patient successfully navigated to correct page to communicate with initial verbal cueing for navigation of novel phrases/topics. Patient also demonstrated ability to alternate between "Picture Page Set" and "Text Page Set" in order utilize pictures or keyboard.   Patient will greet and initiate conversation independently 5x per session     Goal Met 10/25/2022   Patient utilized nonverbal gestures with combination of AAC and verbal communication ("hello") to greet and initiate conversation during session. Patient showed independence in use of AAC to respond to questions provided by ST.       Given a specific message to find, patient will independently navigate to the correct page on 8/10 attempts.      Goal Met 10/25/2022   Patient located and navigated to located correct messages independently with 100% accuracy. (I.e., "if someone asked you what your name is, what would you say" and patient located button for "My name is Sophia").      Patient will demonstrate comprehension of basic maintenance and operations (on-off, adjusting volume) of the SGD on 5/5 attempts.     Goal Met 10/25/2022   Patient demonstrated comprehension of basic on-off functions, adjusting volume with 100% accuracy and moderate verbal and visual cues. Will continue to address.     Patient and family will be educated on basic editing, use, functioning of device with device set up with 90% accuracy and minimal assistance.      Goal Met 10/3/2022   Patient and family educated on device use, basic setup, and primarily on steps to edit buttons/pages, etc. Patient's son and grandson demonstrated education as they edited buttons as well as patient with 90% accuracy and minimal-moderate verbal cues. Upon session completion, basic functions and phrases " "established and patient able to communicate basic wants/needs.       Patient Education/Response:   Patient and family educated on device use, basic setup, and primarily on steps to edit buttons/pages, etc. Patient's son and grandson demonstrated education as they edited buttons as well as patient with 95% accuracy and initial verbal cues. Upon session completion, basic functions and phrases established and patient able to communicate basic wants/needs.       Home program established: yes-editing buttons, pages   Exercises were reviewed and Sophia was able to demonstrate them prior to the end of the session.  Sophia demonstrated good  understanding of the education provided.      See Electronic Medical Record under Patient Instructions for exercises provided throughout therapy.  Assessment:   Assessment of Current Status: Overall, patient has gained understanding toward rationale for speech therapy to address utilization of Tobii-Dynavox I-13. She is independent with basic maintenance and operation of device (on/off, volume). Patient utilized nonverbal gestures with combination of AAC and verbal communication ("hello") to greet and initiate conversation. Patient also independently navigates to correct page to answer questions and to express personal information, likes/ dislikes, make requests, discuss her family, etc. Patient also demonstrated ability to edit/ customize buttons to personalize them. She has met all goals and has met maximum rehab potential for the present time. She was encouraged to monitor use of AAC device and swallowing follow up with ST given any changes as needed. She expressed understanding and agreement with plan of care.     Goals: See progress toward goals above.      Long Term Goals:  1. Sophia Hope will use the SGD to efficiently interact with familiar and unfamiliar communication partners to improve functional communication.   2. Sophia Hope will use the SGD to express medical " emergency situations or health related information independently to communication partners to improve functional communication.     Discharge reason: Patient has met all of her goals of utilization of AAC device and Patient has reached the maximum rehab potential for the present time.  Plan:   The patient is discharged from Speech Therapy.    Magda Garcia, BS  Student Speech Language Pathologist    Fany Johnson MA, CCC-SLP  Speech Language Pathologist  10/25/2022

## 2022-10-26 ENCOUNTER — LAB VISIT (OUTPATIENT)
Dept: LAB | Facility: HOSPITAL | Age: 67
End: 2022-10-26
Payer: MEDICARE

## 2022-10-26 ENCOUNTER — TELEPHONE (OUTPATIENT)
Dept: NEUROLOGY | Facility: CLINIC | Age: 67
End: 2022-10-26
Payer: MEDICARE

## 2022-10-26 ENCOUNTER — OFFICE VISIT (OUTPATIENT)
Dept: NEUROLOGY | Facility: CLINIC | Age: 67
End: 2022-10-26
Payer: MEDICARE

## 2022-10-26 VITALS — OXYGEN SATURATION: 97 %

## 2022-10-26 DIAGNOSIS — Z79.899 DRUG THERAPY: ICD-10-CM

## 2022-10-26 DIAGNOSIS — R47.1 DYSARTHRIA: ICD-10-CM

## 2022-10-26 DIAGNOSIS — Z63.4 RECENT BEREAVEMENT: ICD-10-CM

## 2022-10-26 DIAGNOSIS — Z74.09 IMPAIRED MOBILITY AND ADLS: ICD-10-CM

## 2022-10-26 DIAGNOSIS — G89.29 CHRONIC MIDLINE LOW BACK PAIN WITHOUT SCIATICA: ICD-10-CM

## 2022-10-26 DIAGNOSIS — R13.12 OROPHARYNGEAL DYSPHAGIA: ICD-10-CM

## 2022-10-26 DIAGNOSIS — M19.012 PRIMARY OSTEOARTHRITIS OF LEFT SHOULDER: ICD-10-CM

## 2022-10-26 DIAGNOSIS — M54.50 CHRONIC MIDLINE LOW BACK PAIN WITHOUT SCIATICA: ICD-10-CM

## 2022-10-26 DIAGNOSIS — M47.816 SPONDYLOSIS OF LUMBAR REGION WITHOUT MYELOPATHY OR RADICULOPATHY: ICD-10-CM

## 2022-10-26 DIAGNOSIS — R25.2 SPASTICITY: ICD-10-CM

## 2022-10-26 DIAGNOSIS — Z00.8 NUTRITIONAL ASSESSMENT: ICD-10-CM

## 2022-10-26 DIAGNOSIS — Z78.9 IMPAIRED MOBILITY AND ADLS: ICD-10-CM

## 2022-10-26 DIAGNOSIS — G12.21 ALS (AMYOTROPHIC LATERAL SCLEROSIS): Primary | ICD-10-CM

## 2022-10-26 DIAGNOSIS — R26.89 IMPAIRED GAIT AND MOBILITY: ICD-10-CM

## 2022-10-26 DIAGNOSIS — G12.21 ALS (AMYOTROPHIC LATERAL SCLEROSIS): ICD-10-CM

## 2022-10-26 DIAGNOSIS — R26.9 GAIT DISORDER: ICD-10-CM

## 2022-10-26 DIAGNOSIS — J96.10 CHRONIC NEUROMUSCULAR RESPIRATORY FAILURE: ICD-10-CM

## 2022-10-26 DIAGNOSIS — M17.0 PRIMARY OSTEOARTHRITIS OF BOTH KNEES: ICD-10-CM

## 2022-10-26 DIAGNOSIS — K11.7 SIALORRHEA: Chronic | ICD-10-CM

## 2022-10-26 LAB
ALBUMIN SERPL BCP-MCNC: 3.4 G/DL (ref 3.5–5.2)
ALBUMIN SERPL BCP-MCNC: 3.4 G/DL (ref 3.5–5.2)
ALP SERPL-CCNC: 57 U/L (ref 55–135)
ALP SERPL-CCNC: 57 U/L (ref 55–135)
ALT SERPL W/O P-5'-P-CCNC: 11 U/L (ref 10–44)
ALT SERPL W/O P-5'-P-CCNC: 11 U/L (ref 10–44)
ANION GAP SERPL CALC-SCNC: 11 MMOL/L (ref 8–16)
AST SERPL-CCNC: 22 U/L (ref 10–40)
AST SERPL-CCNC: 22 U/L (ref 10–40)
BILIRUB DIRECT SERPL-MCNC: 0.3 MG/DL (ref 0.1–0.3)
BILIRUB SERPL-MCNC: 0.9 MG/DL (ref 0.1–1)
BILIRUB SERPL-MCNC: 0.9 MG/DL (ref 0.1–1)
BUN SERPL-MCNC: 18 MG/DL (ref 8–23)
CALCIUM SERPL-MCNC: 10.3 MG/DL (ref 8.7–10.5)
CHLORIDE SERPL-SCNC: 105 MMOL/L (ref 95–110)
CO2 SERPL-SCNC: 25 MMOL/L (ref 23–29)
CREAT SERPL-MCNC: 1.1 MG/DL (ref 0.5–1.4)
EST. GFR  (NO RACE VARIABLE): 54.4 ML/MIN/1.73 M^2
GLUCOSE SERPL-MCNC: 100 MG/DL (ref 70–110)
POTASSIUM SERPL-SCNC: 3.9 MMOL/L (ref 3.5–5.1)
PROT SERPL-MCNC: 7.1 G/DL (ref 6–8.4)
PROT SERPL-MCNC: 7.1 G/DL (ref 6–8.4)
SODIUM SERPL-SCNC: 141 MMOL/L (ref 136–145)

## 2022-10-26 PROCEDURE — 99999 PR PBB SHADOW E&M-EST. PATIENT-LVL III: ICD-10-PCS | Mod: PBBFAC,,,

## 2022-10-26 PROCEDURE — 97162 PT EVAL MOD COMPLEX 30 MIN: CPT | Mod: PN

## 2022-10-26 PROCEDURE — 99215 OFFICE O/P EST HI 40 MIN: CPT | Mod: S$PBB,,, | Performed by: INTERNAL MEDICINE

## 2022-10-26 PROCEDURE — 92610 EVALUATE SWALLOWING FUNCTION: CPT | Mod: PN

## 2022-10-26 PROCEDURE — 36415 COLL VENOUS BLD VENIPUNCTURE: CPT | Performed by: PHYSICIAN ASSISTANT

## 2022-10-26 PROCEDURE — 99214 OFFICE O/P EST MOD 30 MIN: CPT | Mod: S$PBB,,, | Performed by: PHYSICAL MEDICINE & REHABILITATION

## 2022-10-26 PROCEDURE — 99214 PR OFFICE/OUTPT VISIT, EST, LEVL IV, 30-39 MIN: ICD-10-PCS | Mod: S$PBB,,, | Performed by: PHYSICAL MEDICINE & REHABILITATION

## 2022-10-26 PROCEDURE — 99999 PR PBB SHADOW E&M-EST. PATIENT-LVL III: CPT | Mod: PBBFAC,,,

## 2022-10-26 PROCEDURE — 80053 COMPREHEN METABOLIC PANEL: CPT | Performed by: PHYSICIAN ASSISTANT

## 2022-10-26 PROCEDURE — 97165 OT EVAL LOW COMPLEX 30 MIN: CPT | Mod: PO

## 2022-10-26 PROCEDURE — 99417 PROLNG OP E/M EACH 15 MIN: CPT | Mod: S$PBB,,, | Performed by: PSYCHIATRY & NEUROLOGY

## 2022-10-26 PROCEDURE — 99215 PR OFFICE/OUTPT VISIT, EST, LEVL V, 40-54 MIN: ICD-10-PCS | Mod: S$PBB,,, | Performed by: PSYCHIATRY & NEUROLOGY

## 2022-10-26 PROCEDURE — 99215 OFFICE O/P EST HI 40 MIN: CPT | Mod: S$PBB,,, | Performed by: PSYCHIATRY & NEUROLOGY

## 2022-10-26 PROCEDURE — 99213 OFFICE O/P EST LOW 20 MIN: CPT | Mod: PBBFAC

## 2022-10-26 PROCEDURE — 99417 PR PROLONGED SVC, OUTPT, W/WO DIRECT PT CONTACT,  EA ADDTL 15 MIN: ICD-10-PCS | Mod: S$PBB,,, | Performed by: PSYCHIATRY & NEUROLOGY

## 2022-10-26 PROCEDURE — 99215 PR OFFICE/OUTPT VISIT, EST, LEVL V, 40-54 MIN: ICD-10-PCS | Mod: S$PBB,,, | Performed by: INTERNAL MEDICINE

## 2022-10-26 PROCEDURE — 80076 HEPATIC FUNCTION PANEL: CPT | Performed by: PSYCHIATRY & NEUROLOGY

## 2022-10-26 RX ORDER — MELOXICAM 7.5 MG/1
7.5 TABLET ORAL DAILY
Qty: 30 TABLET | Refills: 3 | Status: SHIPPED | OUTPATIENT
Start: 2022-10-26

## 2022-10-26 SDOH — SOCIAL DETERMINANTS OF HEALTH (SDOH): DISSAPEARANCE AND DEATH OF FAMILY MEMBER: Z63.4

## 2022-10-26 NOTE — PROGRESS NOTES
CHIEF COMPLAINT:  Follow-up (Nutrition/)    HISTORY OF PRESENT ILLNESS: Sophia Hope is a 67 y.o. female with onset of speech slowing and hoarseness in May/June 2021, then left leg weakness.  No history of lung disease, notes dyspnea while coordinating swallow. Using phone typing for communication. + sinus congestion treated year round with montelukast, controlled.  Occ excessive saliva. Using cough assist 2 x since moving in with son 5 weeks ago.  Notes ankle edema persists. Sleeping in a recliner for leg comfort, is unsure about shortness of breath when supine.       PAST MEDICAL HISTORY:    Past Medical History:   Diagnosis Date    Arthritis     Benign colon polyp     Cataract     Diverticulosis     Glaucoma (increased eye pressure)     History of abnormal Pap smear     History of herpes zoster     Right flank 4/30/12    Hypertension     Hypothyroidism     Osteopenia     Postmenopausal        PAST SURGICAL HISTORY:    Past Surgical History:   Procedure Laterality Date    BTL      CATARACT EXTRACTION W/  INTRAOCULAR LENS IMPLANT Left 12/19/2018    w/ ISTENT(INJ)    CATARACT EXTRACTION W/  INTRAOCULAR LENS IMPLANT Right 07/25/2018    W/ ISTENT    CERVIX LESION DESTRUCTION      Abnormal PAP    COLONOSCOPY N/A 5/29/2019    Procedure: COLONOSCOPY;  Surgeon: To Clay MD;  Location: [facility];  Service: Endoscopy;  Laterality: N/A;    hysteroscopic ablation      I-STENT Right 07/25/2018    DR. HENRY    PCIOL Right 07/25/2018    DR. HENRY    MS DILATION/CURETTAGE,DIAGNOSTIC      D & C    TONSILLECTOMY         FAMILY HISTORY:                Family History   Problem Relation Age of Onset    Cancer Mother         stomach cancer    Cataracts Mother     Hypertension Sister     Cancer Sister         liver cancer    Hypertension Sister     Heart disease Brother         MI/CAD    Breast cancer Daughter         age 39    No Known Problems Son     Diabetes Neg Hx     Stroke Neg Hx     Blindness Neg Hx     Glaucoma Neg Hx      Macular degeneration Neg Hx     Retinal detachment Neg Hx     Strabismus Neg Hx     Thyroid disease Neg Hx        SOCIAL HISTORY:          Occupation / Environment: Tax preparation, retired twice, 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  Social support: lives in [place], daughter Petty Sandoval  in Sept, lives with son Jose M, niece Alessandra here  Social History     Tobacco Use   Smoking Status Former    Years: 5.00    Types: Cigarettes    Quit date: 1983    Years since quittin.4   Smokeless Tobacco Never       ALLERGIES:    Review of patient's allergies indicates:   Allergen Reactions    Clonidine      Other reaction(s): Rash    Cardizem [diltiazem hcl] Rash     Years ago per patient       CURRENT MEDICATIONS:    Current Outpatient Medications   Medication Sig Dispense Refill    allopurinoL (ZYLOPRIM) 100 MG tablet TAKE 1 TABLET(100 MG) BY MOUTH EVERY DAY 90 tablet 1    atenoloL (TENORMIN) 100 MG tablet TAKE 1 TABLET(100 MG) BY MOUTH EVERY DAY 90 tablet 3    atenoloL (TENORMIN) 50 MG tablet TAKE 1 TABLET(50 MG) BY MOUTH EVERY DAY 90 tablet 3    baclofen (LIORESAL) 10 MG tablet Take 1 tablet (10 mg total) by mouth nightly as needed. For back pain 30 tablet 2    calcium carbonate-vitamin D2 600 mg calcium- 200 unit Cap Take 1 tablet by mouth Twice daily.      COMBIGAN 0.2-0.5 % Drop INSTILL 1 DROP IN BOTH EYES TWICE DAILY 10 mL 12    dextran 70-hypromellose (TEARS) ophthalmic solution Place 1 drop into both eyes. BOTH EYES PRN       hydroCHLOROthiazide (HYDRODIURIL) 25 MG tablet TAKE 1 TABLET(25 MG) BY MOUTH EVERY DAY 30 tablet 5    irbesartan (AVAPRO) 300 MG tablet TAKE 1 TABLET(300 MG) BY MOUTH EVERY DAY 90 tablet 3    latanoprost 0.005 % ophthalmic solution INSTILL 1 DROP IN BOTH EYES EVERY DAY AT BEDTIME 2.5 mL 12    levothyroxine (SYNTHROID) 88 MCG tablet Take 1 tablet (88 mcg total) by mouth every morning. 90 tablet 3    meloxicam (MOBIC) 7.5 MG tablet Take 1 tablet (7.5 mg total) by mouth once daily. 30 tablet  3    montelukast (SINGULAIR) 10 mg tablet TAKE 1 TABLET(10 MG) BY MOUTH EVERY DAY 90 tablet 3    pantoprazole (PROTONIX) 40 MG tablet TAKE 1 TABLET(40 MG) BY MOUTH EVERY DAY 30 tablet 11    riluzole 50 mg Tab tablet Take 1 tablet (50 mg total) by mouth every 12 (twelve) hours. 60 tablet 11     No current facility-administered medications for this visit.                  REVIEW OF SYSTEMS:   Pulmonary related symptoms as per HPI.  Gen:  no weight loss, no fever, no night sweats  HEENT:  tr sinus congestion, ++ dysphagia, +++ voice changes - nonverbal, tr sialorrhea  CV:  no chest pain, ? orthopnea, no paroxysmal nocturnal dyspnea  GI:  no melena, no hematochezia, no diarrhea, no constipation.  :  no dysuria, no hematuria, no incontinence  Neuro:  + focal weakness, ambulating  Sleep:  restful         PHYSICAL EXAM:   Respiratory Rate: 14 NIV during clinic visit?  There were no vitals filed for this visit.    GENERAL:  Alert, calm, in no  distress  HEENT:  Normal conjunctiva, ptosis, normal EOM, nasal and oral mucosa normal, tongue + fascic, part protrusion  NECK:  supple; no palpable lymphadenopathy or masses, no jugular venous distention.  CVS: regular rate and rhythm, no murmers, gallops or rubs  PULM: Grossly decreased inspiratory capacity, normal to percussion and palpation, clear to auscultation bilaterally with no wheezes, crackles or rhonchi, ++ accessory muscle use with inspiratory effort  ABDOMEN:  soft nontender/nondistended, rise with inspiration  EXTREMITIES no cyanosis, clubbing, 2+ edema  NEURO:  Focal weakness, ambulatory with support    CONTRIBUTORY STUDIES:     PULMONARY FUNCTION TESTS: FVC 25% (0.66), PCF 76, 97%      ACTIVE PULMONARY PROBLEMS:    ICD-10-CM ICD-9-CM    1. ALS (amyotrophic lateral sclerosis)  G12.21 335.20       2. Dysarthria  R47.1 784.51       3. Oropharyngeal dysphagia  R13.12 787.22       4. Impaired gait and mobility  R26.89 781.2       5. Impaired mobility and ADLs  Z74.09  V49.89     Z78.9        6. Spasticity  R25.2 781.0       7. Chronic midline low back pain without sciatica  M54.50 724.2     G89.29 338.29       8. Spondylosis of lumbar region without myelopathy or radiculopathy  M47.816 721.3       9. Primary osteoarthritis of both knees  M17.0 715.16       10. Primary osteoarthritis of left shoulder  M19.012 715.11       11. Gait disorder  R26.9 781.2       12. Nutritional assessment  Z00.8 V72.85           ASSESSMENT&PLAN:  1. Chronic neuromuscular respiratory failure in setting of ALS.  Will benefit from trilogy ventilator with advanced modes (AVAPS AE, PC). Bipap is not sufficient. Needs nocturnal/daytime ventilator.   2. Decreased cough efficacy.  Using cough assist, need to train family in new residence  3. Allergic rhinitis - controlled with montelukast  4. Gastrostomy tube indicated for dysphagia and progressive weakness - unsure whether orthopnea has developed - family will assess and communicate with me to evaluate risk of positioning for gastrostomy  5. LE edema - elevation - may benefit from diuretic  6. Goals of care - does not wish tracheostomy, LAPOST completed - DNAR, no intubation      No follow-ups on file.      50 minutes reviewing medical records, studies, evaluating patient and discussing recommendations with patient and multidisciplinary team.

## 2022-10-26 NOTE — PROGRESS NOTES
Subjective:       Patient ID: Sophia Hope is a 67 y.o. female.    Chief Complaint: No chief complaint on file.      HPI    Mrs. Hope is a 67-year-old female who presented to the ALS Clinic on 7/6/2022 for multidisciplinary care.  She was diagnosed with motor neuron disease after a progressive course of trouble walking and bulbar symptoms.  Her past medical history is also significant hypertension, hypothyroidism, osteopenia and arthritis. She is being followed up by the physical medicine and rehabilitation service for chronic low back pain, knee pain and shoulder pain due to OA.  She was maintained on diclofenac gel, baclofen and p.r.n. Tylenol. Regular use of meloxicam was discouraged due to elevation of BUN..    The patient is coming to ALS clinic for follow up.  Her low back pain has been stable.  It is an intermittent aching in the lower lumbar spine.  She denies any radiation to her legs. Her maximum pain is 3-4/10 and minimum 0/10.  Today it is 0 10.  She has mild lower extremity weakness.  She denies any leg numbness.  She denies any bowel or bladder incontinence    Her bilateral knee pain has been under good control.  Her left shoulder pain has subsided.  She denies any significant muscle spasms.    She is currently taking:  Diclofenac 1% gel topically as needed, usually couple times per day  Baclofen 10 mg p.r.n. for pain and spasms, but she has not been recently taking it.  Tylenol 500 mg p.r.n., usually once daily  She was on meloxicam 15 mg po qd last visit, but it was held due to mild elevation on BUN. Her last blood test on 7/6/22 showed BUN/creatinine of 16/1.0.        Past Medical History:   Diagnosis Date    Arthritis     Benign colon polyp     Cataract     Diverticulosis     Glaucoma (increased eye pressure)     History of abnormal Pap smear     History of herpes zoster     Right flank 4/30/12    Hypertension     Hypothyroidism     Osteopenia     Postmenopausal         Review of patient's  allergies indicates:   Allergen Reactions    Clonidine      Other reaction(s): Rash    Cardizem [diltiazem hcl] Rash     Years ago per patient        Review of Systems   Constitutional:  Positive for fatigue. Negative for chills and fever.   HENT:  Negative for trouble swallowing.    Eyes:  Negative for visual disturbance.   Respiratory:  Negative for shortness of breath.    Cardiovascular:  Negative for chest pain.   Gastrointestinal:  Negative for blood in stool, constipation, nausea and vomiting.   Genitourinary:  Negative for difficulty urinating.   Musculoskeletal:  Positive for arthralgias, back pain and gait problem. Negative for neck pain.   Neurological:  Positive for speech difficulty and weakness. Negative for dizziness and headaches.   Psychiatric/Behavioral:  Negative for behavioral problems.            Objective:      Physical Exam  Vitals reviewed.   Constitutional:       General: She is not in acute distress.     Appearance: She is well-developed.      Comments: Coming to the clinic in a manual wheelchair propelled by family member     HENT:      Head: Normocephalic and atraumatic.   Musculoskeletal:      Cervical back: Normal range of motion.      Comments: BUE:  ROM:   RUE: full.   LUE: full.  Strength:    RUE: 5/5 at shoulder abduction, 5 elbow flexion, 5 elbow extension, 5 hand .   LUE: 5/5 at shoulder abduction, 5 elbow flexion, 5 elbow extension, 5 hand .  Sensation to pinprick:   RUE: intact.   LUE: intact.      BLE:  ROM:     RLE: full. Mild increase tone.     LLE: full. Mild increase tone.   Knee crepitus.:     RLE: +ve.      LLE: +ve.   Strength:    RLE: 3+/5 at hip flexion, 4+ knee extension, 5 ankle DF, 4 PF.   LLE: 3/5 at hip flexion, 4 knee extension, 5 ankle DF, 5 PF.  Sensation to pinprick:     RLE: intact.      LLE: intact.   SLR (sitting):      RLE: -ve.      LLE: -ve.          Skin:     General: Skin is warm.   Neurological:      Mental Status: She is alert and oriented to  person, place, and time.       Assessment:       1. ALS (amyotrophic lateral sclerosis)    2. Dysarthria    3. Oropharyngeal dysphagia    4. Impaired gait and mobility    5. Impaired mobility and ADLs    6. Spasticity    7. Chronic midline low back pain without sciatica    8. Spondylosis of lumbar region without myelopathy or radiculopathy    9. Primary osteoarthritis of both knees    10. Primary osteoarthritis of left shoulder    11. Gait disorder        Plan:       - Restart meloxicam at reduced dose of 7.5 mg po qd.  - Continue diclofenac sodium (VOLTAREN) 1 % Gel; Apply 2 g topically 3 (three) times daily as needed.  - Continue Tylenol 500 mg tablets, 1-2 tablets by mouth 3 times per day as needed for pain  - Continue baclofen 10 mg tablets, 1 tablet by mouth 3 times per day as needed for muscle spasms  - Follow up in ALS clinic.      This note was partly generated with AWAK voice recognition software. I apologize for any possible typographical errors.

## 2022-10-26 NOTE — PROGRESS NOTES
[facility]      SPEECH THERAPY NEUROLOGICAL REHABILITATION EVALUATION    ALS MULTIDISCIPLINARY CLINIC     Date: 10/26/2022    Name: Sophia Hope   MRN: [id number]    Therapy Diagnosis:   Encounter Diagnoses   Name Primary?    ALS (amyotrophic lateral sclerosis) Yes    Dysarthria     Oropharyngeal dysphagia     Impaired gait and mobility     Impaired mobility and ADLs     Spasticity     Chronic midline low back pain without sciatica     Spondylosis of lumbar region without myelopathy or radiculopathy     Primary osteoarthritis of both knees     Primary osteoarthritis of left shoulder     Gait disorder     Nutritional assessment         Physician: Dr. Jose Drake and Dr. Xi Frank  Physician Orders: Ambulatory Referral for Speech   Plan of Care Expiration: Evaluation Only   Medical Diagnosis: ALS     Initial Clinic Evaluation Date: 4/13/2022  ALS Clinic Number: 3    Time In: 09:24 am  Time Out: 09:52 am  Total Minutes: 28 minutes   Procedure Min.   Evaluation of Speech Sound Production 0   Swallow and Oral Function Evaluation  28     Precautions: progressive degenerative disease, Standard and Fall, speech difficulty     Subjective   Date of Onset: March/April 2022. Symptoms began with left sided weakness, voice changes, and speech and swallowing difficulties   History of Current Condition: Sophia Hope  presents to the [facility] secondary to the diagnosis of ALS. No new medical changes since last clinic. This is her third clinic visit. Patient was evaluated for and has received a speech generating device, however, she prefers to text on her phone and have communication partners read her message. Patient's son and niece were present with her today.   Chief Complaint: Speech and swallowing difficulties   Past Medical History:  has a past medical history of Arthritis, Benign colon polyp, Cataract, Diverticulosis, Glaucoma  "(increased eye pressure), History of abnormal Pap smear, History of herpes zoster, Hypertension, Hypothyroidism, Osteopenia, and Postmenopausal. Sophia Hope  has a past surgical history that includes Cervix lesion destruction; hysteroscopic ablation; pr dilation/curettage,diagnostic; BTL; Tonsillectomy; PCIOL (Right, 07/25/2018); I-STENT (Right, 07/25/2018); Cataract extraction w/  intraocular lens implant (Left, 12/19/2018); Cataract extraction w/  intraocular lens implant (Right, 07/25/2018); and Colonoscopy (N/A, 5/29/2019).  Medical Hx and Allergies:  Sophia has a current medication list which includes the following prescription(s): allopurinol, atenolol, atenolol, baclofen, calcium carbonate-vitamin d2, combigan, dextran 70-hypromellose, hydrochlorothiazide, irbesartan, latanoprost, levothyroxine, meloxicam, montelukast, pantoprazole, and riluzole.   Review of patient's allergies indicates:   Allergen Reactions    Clonidine      Other reaction(s): Rash    Cardizem [diltiazem hcl] Rash     Years ago per patient     Prior Therapy: None                      Home Health Therapy at present time: No  Social History: Sophia lives in [place] with her daughter, Petty, son-in-law, Ava, and three grandchildren. Her son, Jose M, and his family live in the same neighborhood.  Prior Level of Function:  Independent  Current Level of Function: Modified independent for communication  Nutrition: mostly puree and thin liquids.  Sometimes will have soft solids.   Recent MBSS:  12/9/2021- "Pt presents with a safe functional swallow across tested consistencies.  No aspiration or penetration was observed and her swallow strength and coordination was adequate."   Pain: 0/10  Pain Location/Description: n/a   Respiratory Status: Room air, appeared to be within functional limits   Vision: Appeared to be within functional limits.   Hearing: Appeared to be within functional limits in conversation. Will monitor and refer as " necessary.   Objective     Patient's motor speech, fluency, and voice were informally assessed. OME performed within Cranial Nerve Assessment detailed below. Motor speech skills were assessed and were not functional for daily communication with a variety of communication partners (i.e. Family, friends, medical personnel).   Respiration / Phonation:   Average Norms  /WFL Maximum Phon. Time (ah) Words Per Minute:   P^ DNT 5.0-7.1 DNT Trial 1: DNT DNT  words per minute   T^ DNT 4.8-7.1 DNT Trial 2:  DNT >125 = WFL    K^ DNT 4.4-7.4 DNT  <125 = refer for AAC eval   P^T^K^ DNT 3.6-7.5 DNT Avg: DNT Norm: 160-170  (paragraph reading)       Norm (F 10-26, M 13-34.6) Norm: 150 to 250 (norm conversation)        Phonation Oral Reading Conversation   Stimulus Sustained ah:  DNT [place]  DNT History and Conversation   Quality hoarse, dysarthria, muffled, hypernasal, breathy hoarse, dysarthria, muffled, hypernasal, breathy  hoarse, dysarthria, muffled, hypernasal, breathy    Duration  4.5 seconds  1 minute  N/a   Loudness  monoloudness; decreased loudness  monoloudness; decreased loudness monoloudness; decreased loudness   Steadiness monopitch monopitch monopitch     Overall Speech Intelligibility: poor, 0-5% intelligible but demonstrated muffled, hypernasal vocal quality with decreased loudness with slow, imprecise articulation. Reduced breath-speech coordination evident.  Fatigue affects intelligibility. Pt now uses a texting akash to communicate with family and caregivers with occasional vocalizations.    Awareness / Strategy Use:   Patient demonstrates adequate awareness of motor speech impairment.   Speech strategy: deep breath before speaking - no longer effective.    Impact of Motor Speech Impairment on Functioning:   Decreased speech intensity and clarity negatively impacts functional communication in all/most contexts.    Safety Risks: Unable to communicate verbally in an emergency situation.      Communicative Effectiveness Survey: is a questionnaire given to the patient to  the effectiveness of her communicative function.       Communication Situation  Rating on a scale of 1 - 4  1= not at all effective   4= very effective   Having a conversation with a family member or friends at home  DNT   Participating in conversation with strangers in a quiet place DNT   Conversing with a familiar person over the telephone  DNT   Conversing with a stranger over the telephone  DNT   Being part of a conversation in a noisy environment (social gathering) DNT   Speaking to a friend when you are emotionally upset of you are angry DNT   Having a conversation while traveling in a car DNT   Having a conversation with someone at a distance (across a room) DNT     Augmentative/Alternative Communication (AAC): Patient is currently using an augmentative/ alternative communication device but prefers to primarily use texting to communicate her wants and needs.  Type of AAC Device:  WellnessFX I-13  Recommend AAC Eval: yes      Clinical Swallow Exam/ Cranial Nerve Exam:  Cranial Nerve 5: Trigeminal Nerve  Motor Jaw Posture at rest: Closed  Mandible Elevation/Depression: WFL  Mandible lateralization: WFL  Abnormal movement: absent Interpretation: within functional limits   Sensory Forehead: WFL  Cheek: WFL  Jaw: WFL  Facial Pain: None noted Interpretation: within functional limits     Cranial Nerve 7: Facial Nerve  Motor Facial Symmetry:  right labial droop  Wrinkle Forehead: reduced  Close eyes tightly:  reduced , unable to keep eyes closed  Labial Protrusion:  reduced  Labial Retraction:  reduced  Labial alternating movement: reduced  Cheek puffing sustained: unable to puff cheeks  Cheek puffing against resistance: unable to puff cheeks  Abnormal movement: absent Interpretation: reduced   Sensory Formal testing not completed. Pt denied any changes in taste      Cranial Nerves IX and X: Glossopharyngeal and Vagus  Nerves  Motor Palatal Symmetry (Rest): WNL  Palatal Symmetry: (Movement) WNL  Palatal Elevation (Movement): WNL   Palatal Elevation (Sustained): WNL  Cough: Perceptually weak  Voice Prior to PO intake: hoarse  Resonance:  muffled  Abnormal movement: absent Interpretation: within functional limits     Cranial Nerve XII: Hypoglossal Nerve  Motor Tongue at rest:  atrophy and fasciculations  Lingual Protrusion:  reduced strength ; deviates towards left side   Lingual Protrusion against Resistance: unable to protrude against resistance  Lingual Lateralization:  reduced , pt moved chin more than tongue to compensate  Abnormal movement: present Interpretation: reduced     Other information:  Volitional Swallow: Unable to palpate laryngeal rise  Mucosal Quality: No abnormal findings  Secretion Management: Patient reported no drooling but saliva pooling.  Dentition: Good condition for speech and mastication      EAT-10 Score:    Eating Assessment Tool (EAT-10) is a questionnaire given to the patient to  her swallowing function.     Key: 0= no problem; 4= severe problem  1. My swallowing problem has caused me to lose weight. - 1  2. My swallowing problem interferes with my ability to go out for meals. - 4  3. Swallowing liquids takes extra effort. - 2  4. Swallowing solids takes extra effort. - 2  5. Swallowing pills takes extra effort. - 0  6. Swallowing is painful. - 0  7. The pleasure of eating is affected by my swallowing. - 0  8. When I swallow food sticks in my throat. - 2  9. I cough when I eat. - 0  10. Swallowing is stressful. - 2    Sophia ranked her swallowing function as a 13/40     Interpretation: Score of greater than 3 is indicative of a swallowing disorder (Zoila barros al., 2008); higher scores correlate with increased penetration-aspiration  scale scores, and scores greater than 15 results in the patient being 2.2 times more likely to aspirate (Namita et al., 2015)    References:   NA Cummings.,  MICKEY Bhatt., CATRACHO Gallegos., RONALD Barone., SANDRA Kenny., RONALD Salguero, & JAYE Aleman. (2008). Validity and reliability of the Eating Assessment Tool (EAT-10). Annals of Otology, Rhinology & Laryngology, 117(12), 919-924. [email or web address]  MICKEY Breaux., IRON Hatch, RONALD Jensen, IRON Mathew, & NA Cummings. (2015). The ability of the 10-item eating assessment tool (EAT-10) to predict aspiration risk in persons with dysphagia. Annals of Otology, Rhinology & Laryngology, 124(5), 351-354. [email or web address]    [place] Swallow Protocol:  Passed   [place] Swallow Protocol dictates pt remain NPO if fail screener; (Raphaelr et al. 2014) however, as objective swallow assessment is not available for greater than a week, pt will remain on current diet until objective assessment is completed unless otherwise indicated.     Clinical Swallow Examination:   Pt presented with:   THIN:- self regulated thin liquid via cup x3    PUREE:- bite of pudding x3    SOLID: -not administered    DESCRIPTION: Oral Phase: The patient had no anterior loss of the bolus with adequate closure of the lips around the cup. No residue remained in the oral cavity following the swallow of pudding.   Pharyngeal:  No overt s/s of aspiration. Denied globus sensation during today's assessment.      During today's clinical swallow reassessment, pt exhibited no significant difficulty swallowing. There were no overt signs/symptoms of aspiration.  However, the FEES on 5/23/22 indicated mild dysphagia. See details below.     Recommend Modified Barium Swallow Study (MBSS) or Fiberoptic Endoscopic Evaluation of Swallowing (FEES): no; Patient had FEES on 5/23/22  Findings of FEES completed on 5/23/2022 by speech-language pathologist, Magda Dc,   Pharyngoscopic and laryngoscopic findings revealed within functional limits initiation of the swallow, mildly reduced tongue base retraction, within functional limits  epiglottic movement, mildly reduced pharyngeal contraction, within functional limits laryngeal vestibule closure, and complete pharyngoesophageal segment opening. Patient independently utilized multiple swallows per bolus and demonstrated ability to utilize hard effortful swallow, alternate solid with liquid wash, and small bites/sips to decrease risk for residue. Recommend IDDSI level 0 liquid diet consistency; IDDSI level 7 food diet consistency.    Education   Patient and her family were educated on the aspiration precautions, recommendations, AAC, voice banking, message banking and swallowing precautions. They verbalized understanding of all discussed.     Assessment   Impressions: Sophia Hope exhibited moderate mixed spastic-flaccid dysarthria c/b imprecise consonants, slow rate of speech and inadequate breath- speech coordination  and hypernasality, low intensity, and muffled vocal quality. Suspected oropharyngeal dysphagia c/b patient reporting swallowing liquids and solids takes extra effort and food sticking in her throat occasionally when swallowing due to ALS. Patient compensates for swallowing difficulties by taking small sips of liquid and small bites of food while eating slowly. Pt also avoids acidic foods and prefers soft foods such as oatmeal or mashed potatoes. Demonstrates impairments including limitations as described in the problem list. Positive prognostic factors include family support. Negative prognostic factors include progressive nature of disease. Barriers to progress include complex medical history and progressive nature of disease. Patients self assessment on the EAT-10 has decreased since last administered during the last ALS clinic.     Patient's spiritual, cultural and educational needs considered and pt agreeable to plan of care and goals.    ALS Severity Scale:  Stage 3: Behavioral Modifications: Speaking rate is much slower than normal. The speaker repeats specific words in  adverse listening situations and may limit the complexity or length of messages. ALS Severity Scale: 5 or 6      Rehab Potential: fair to guarded     Plan    Recommended Treatment Plan:   1. Follow up with [facility] in 3 months   2. Aspiration Precautions  3. Excellent and frequent oral care    Plan of care expiration: Eval Only     Other Recommendations: none    Cherelle Paz M.S.CCC-SLP  Speech Language Pathologist

## 2022-10-26 NOTE — PROGRESS NOTES
Subjective:       Patient ID: Sophia Hope is a 67 y.o. female.    Chief Complaint:  Motor Neuron Disease (ALS)       History of Present Illness  HPI    Sophia Hope is a 66 y.o. female referred here today to establish care for motor neuron disease. She was diagnosed by Dr. Frank bulbar onset ALS following development of extremity weakness and dysarthria. Her symptoms began with changes in speech beginning around May/June of 2021.     Since the last visit she feels like her strength has declined in the LEs and her speech is worse. She has been very upset and depressed recently since her daughter Petty unexpectedly passed away on September 1, 2022 (she previously was living with her). She now lives with her son in [place], [place].    Review of Systems  Review of Systems      Here with:  Niece Alessandra and son Renan      Strength:  She is having stable strength from the previous visit. Her legs are fairly swollen with significant pitting edema present bilaterally. She needs assistance with dressing and performing ADLs. Declining strength in bilateral LEs.      Dysarthria:  Very difficult to understand verbal communication having extremely slow and unsteady speech process. She received the electronic communication device but prefers her phone to aid in communication      Dysphagia: She has been eating by mouth mostly soft easy to swallow food she is having some difficulty with chewing meat. She does not have a feeding tube in place. She does have some occasional coughing/choking.      Sialorrhea:  Some minimal drooling     Constipation:  Denies      Sleep:  Sleeps in a recliner and says that she is well rested.     Gait: She uses a wheelchair for all ambulation unable to perform meaningful movement of the lower limbs     Falls: Denies     Breathing:  Denies problems with dyspnea while performing ADLs  or SOB with talking. She does have breathing assistance at home to assist as needed (Dr. Salamanca is ordering a  CPAP for the patient to use at night)      Bladder:  Denies issues     Pain:  L knee pain greater than right      Mood:  She is okay. She is not currently interested in medication. Experiencing significant grief due to loss of her daughter.     PBA:  Denies     Memory: She is doing well per family she has a very sharp memory.            Treatment to date:    Riluzole 50 Q12  Radicava (oral) - ordered but not delivered yet         Objective:      Neurologic Exam     Mental Status   Oriented to person, place, and time.   Speech: (Severe dysarthria)  Level of consciousness: alert    Cranial Nerves     CN III, IV, VI   Pupils are equal, round, and reactive to light.  Extraocular motions are normal.     CN XII   Tongue: atrophic  Fasciculations: present  Very poor lateral tongue strength both sides     Gait, Coordination, and Reflexes     Gait  Gait: (Non-ambulatory and uses WC)    Physical Exam  Vitals reviewed.   Constitutional:       Appearance: She is well-developed.   HENT:      Head: Normocephalic and atraumatic.   Eyes:      Extraocular Movements: EOM normal.      Pupils: Pupils are equal, round, and reactive to light.   Neck:      Thyroid: No thyromegaly.   Cardiovascular:      Rate and Rhythm: Normal rate.   Pulmonary:      Effort: Pulmonary effort is normal.   Abdominal:      Palpations: Abdomen is soft.   Musculoskeletal:      Cervical back: Normal range of motion and neck supple.   Lymphadenopathy:      Cervical: No cervical adenopathy.   Skin:     General: Skin is warm and dry.   Neurological:      Mental Status: She is alert and oriented to person, place, and time.   Psychiatric:         Behavior: Behavior normal.         Thought Content: Thought content normal.       ALS Physical Exam PBA Speech Facial Strength Tongue Strength Tongue Appear Jaw Jerk Limb Fasciculations   10/26/2022 No severe moderate severe atrophied;fasic - Absent      ALS Physical Exam (Continued) Neck Flex MMT Neck Ext MMT Shd Abd R MMT  Shd Abd L MMT Wrist Ext R MMT Wrist Ext L MMT Hip Flex R MMT   10/26/2022 4- 4+ 4+ 4+ 4+ 4+ 1      ALS Physical Exam (Continued) Hip Flex L MMT Knee Ext R MMT Knee Ext L MMT Foot DF R MMT Foot DF L MMT UMN Signs Arms Type UMN Signs Legs   10/26/2022 1 4- 4 1 1 3++ Yes      ALS Physical Exam (Continued) UMN Signs Legs Type   10/26/2022 3++       ALSFRS Score: 26  FRS-6 Score: 14                  Impression:        1. ALS (amyotrophic lateral sclerosis)    2. Dysarthria    3. Oropharyngeal dysphagia    4. Impaired gait and mobility    5. Impaired mobility and ADLs    6. Spasticity    7. Chronic midline low back pain without sciatica    8. Spondylosis of lumbar region without myelopathy or radiculopathy    9. Primary osteoarthritis of both knees    10. Primary osteoarthritis of left shoulder    11. Gait disorder    12. Nutritional assessment    13. Chronic neuromuscular respiratory failure    14. Drug therapy    15. Recent bereavement    16. Sialorrhea           Recommendations:             Problem List Items Addressed This Visit       ALS (amyotrophic lateral sclerosis) - Primary    Overview     Symptom developed with changes in speech in May/June 2021 followed by extremity weakness and dysarthria in late 2021    EMG 12/17/21 showed abnormalities in upper and lower extremities.         Current Assessment & Plan     Significant progression of disease since her last visit. ALS-FRS decline form 36 to 26.    - Continue Riluzole 50 Q12   - Orders have been placed for Radicava ORS   - Labs ordered         Relevant Orders    HME - OTHER    IR Gastrostomy Tube    Ambulatory referral/consult  to [facility] Interventional RAD    CT Abdomen Pelvis W Wo Contrast    HME - OTHER    Oropharyngeal dysphagia    Current Assessment & Plan     She needs gastric tube placement secondary to dysphagia and worsening neuromuscular respiratory weakness / failure. She may have troubles tolerating the procedure.   - Referral placed to IR for  gastrostomy placement   - CT Abd / Pelvis placed         Relevant Orders    IR Gastrostomy Tube    Ambulatory referral/consult  to [facility] Interventional RAD    CT Abdomen Pelvis W Wo Contrast    Dysarthria    Impaired gait and mobility    Current Assessment & Plan     Mrs. Jordan was seen today for mobility evaluation for a power mobility device due to significant impairment at home. She has gait impairment and is unable to use a walker consistently more than 20-30 feet due to BLE weakness and fatigue secondary to ALS. She also has impaired balance due to weakness related to ALS. She is not able to ambulate safely to the kitchen or living room and is unable to use an optimally-configured manual wheelchair in the home in order to perform Mobility Related Activities of Daily Living due to BUE weakness & fatigue. She also has dyspnea due to respiratory muscle fatigue. Her cognition is intact and she should be able to use a power mobility device well at home. She was given a prescription for a power wheelchair. A scooter would not be appropriate due the patient's difficulty controlling the scooter tiller due to hand weakness & fatigue and to maneuverability restrictions at home. A power wheelchair will allow the patient to go safely to the kitchen, dining room or living room for feeding & socialization.   - Yolanda lift with hygiene sling was ordered   - Home Health OT orders placed for Yolanda lift training   - Alert quintana recommended             Chronic neuromuscular respiratory failure    Current Assessment & Plan     Dr. Salamanca is recommending NIV and cough assist device.   - She will need NIV and cough assist training         Relevant Orders    HME - OTHER    Impaired mobility and ADLs    Current Assessment & Plan      - She will benefit from a shower WC to leave in the bathroom. ALSA to assist    - Energy conservation strategies were emphasized.         Relevant Orders    HME - OTHER    Sialorrhea (Chronic)    Current  Assessment & Plan     Not interested in medications at this time         Recent bereavement    Spasticity    Chronic midline low back pain without sciatica    Primary osteoarthritis of both knees     Other Visit Diagnoses       Spondylosis of lumbar region without myelopathy or radiculopathy        Primary osteoarthritis of left shoulder        Gait disorder        Nutritional assessment        Drug therapy              A total of 90 minutes was spent on this encounter and included charts and records review from other medical providers, imaging and diagnostic testing results review, patient interview and examination and discussion, documentation, and discussion of planning with the ALS Multidisciplinary Team and ALS Clinic coordinator.    RTC in 3 months or sooner as needed. Communicate needs via Portal.    VERONICA Bess MD  [facility]

## 2022-10-26 NOTE — PROGRESS NOTES
[facility]   Occupational Therapy Evaluation     at [facility] CL[facility]IC     Date: 10/26/2022    Name: Sophia Hope   MRN: [id number]    Therapy Diagnosis: Impaired mobility and ADL's  Physician: Dr. Jose Drake and Dr. Xi Frank  Physician Orders: Ambulatory Referral for Occupational Therapy at ALS Clinic  Plan of Care Expiration: Screening Only  Medical Diagnosis: UMND    Initial Clinic Date: 4/13/22  ALS Clinic Number:[id number]    Time In: 10:37am  Time Out: 11:10am  Total Billable Time: 16.5 minutes     Precautions: Standard and Fall    SUBJECTIVE   Patient reports: Feels stable from last visit but endorses UE weakness has been an issue    History of Present Illness: Sophia is a 67 y.o. female that presents to [facility] clinic secondary to dx of ALS. Pt was first diagnosed 2021. Symptom onset was eyelid droopiness in 2020     Medical History:   Past Medical History:   Diagnosis Date    Arthritis     Benign colon polyp     Cataract     Diverticulosis     Glaucoma (increased eye pressure)     History of abnormal Pap smear     History of herpes zoster     Right flank 4/30/12    Hypertension     Hypothyroidism     Osteopenia     Postmenopausal      Surgical History:   Sophia Hope  has a past surgical history that includes Cervix lesion destruction; hysteroscopic ablation; pr dilation/curettage,diagnostic; BTL; Tonsillectomy; PCIOL (Right, 07/25/2018); I-STENT (Right, 07/25/2018); Cataract extraction w/  intraocular lens implant (Left, 12/19/2018); Cataract extraction w/  intraocular lens implant (Right, 07/25/2018); and Colonoscopy (N/A, 5/29/2019).  Medications:   Sophia has a current medication list which includes the following prescription(s): allopurinol, atenolol, atenolol, baclofen, calcium carbonate-vitamin d2, combigan, dextran 70-hypromellose, hydrochlorothiazide, irbesartan, latanoprost, levothyroxine, meloxicam, montelukast,  pantoprazole, and riluzole.  Allergies:   Review of patient's allergies indicates:   Allergen Reactions    Clonidine      Other reaction(s): Rash    Cardizem [diltiazem hcl] Rash     Years ago per patient        Prior or current therapy: n/a    [x] denies current home health services or hospice care    Family Present at time of Eval: Son Renan and niece     Social History: lives with their son  Home Access: single level home    DME: rollator and walker  shower chair    Occupation/hobbies/homemaking: Relaxing and watching TV  Job description includes: n/a  Driving status: no longer    Prior Level of Function: modified independence   Current Level of Function: modified independence      Falls: none recently  Near falls: yes     Pain: No verbal score this date  Current 0/10, worst 0/10, best 0/10   Location: L knee    Pt's goals: Education and equipment needs    OBJECTIVE   Patient presents to clinic: manual w/c      Ability to complete Mobility-Related Activities of Daily Living (MRADL's) with CURRENT Mobility Device  Move room to room total assist MRADL's Comments: Pt. Is not a functional Ambulator    Meal preparation total assist    Feeding independent with increased time    Bathing total assist    Grooming total assist    Upper Extremity Dressing minimal assist    Lower Extremity Dressing minimal assist    Toileting independent with increased time    Bowel Management: continent   Bladder Management: continent         Upper Extremity Shoulder Posture:  Functional -bilateral   Tone/Movement of upper extremities:   Normal    Edema: none  Location: n/a         Wrist & Hand Handedness: right   Hand Limitations:    Limitations -bilateral  Weak grasp -bilateral  Poor dexterity -bilateral         Upper Extremity Range of Motion     ROM   Shoulder Flexion limited as follows: 135   Shoulder Abduction limited as follows: 135   Shoulder Adduction  WFLs   Elbow Flexion WFLs   Elbow Extension WFLs   Wrist Flexion   WFLs   Wrist  Extension WFLs   Pinch Strength NT    Strength NT     Upper Extremity Strength  (R) UE  (L) UE    Shoulder flexion: 3/5 Shoulder flexion: 3/5   Shoulder Abduction: 3/5 Shoulder abduction: 3/5   Shoulder Abduction: 3/5 Shoulder Abduction: 3/5   Elbow flexion: 3/5 Elbow flexion: 3/5   Elbow extension: 4/5 Elbow extension: 4/5   Wrist flexion: 4/5 Wrist flexion: 4/5   Wrist extension: 4/5 Wrist extension: 4/5       Cognitive Exam:  Oriented: Person, Place, Time and Situation  Behaviors: normal, cooperative  Follows Commands/attention: Follows multistep  commands  Communication: dysarthria  Memory: No Deficits noted   Safety awareness/insight to disability: aware of diagnosis, treatment, and prognosis  Coping skills/emotional control: Appropriate to situation    Dominant hand:  right     Posture Alignment: [] forward head  [x] forward/rounded shoulders  [x] head drop   [] increased thoracic kyphosis  [] sacral sitting  [] unremarkable    Postural examination/scapula alignment: Rounded shoulder and Slouched posture  Joint integrity: Firm end feeling  Skin integrity: warm/dry    Tone:  [] hypotonic [] hypertonic/spastic     [] Fasciculations   Limbs/muscles affected: [] neck musculature  [] trunk [] right arm  [] left arm  [] right leg  [] left leg      Edema: [x] Moderate LEs  [] pitting [x] non-pitting        Gross motor coordination:   ROSALIE:slowed  FTN:slowed    Fine motor coordination: 9 hole peg test, not reassessed this date.   Date 4/13/2022 10/26/22   R hand(secs) 30.84s 43.18   L hand (secs) 41.13s 1:06.82     Sensation: Pt. Reports normal sensation       Joint Evaluation AROM 4/13/2022 and 7/6/2022 10/26/22   10/26/22      Left Right Left Right   Shoulder flex 0-180 WFL  135   Shoulder Abd 0-180 WFL  135   Shoulder ER 0-90 WFL WFL     Shoulder IR 0-90 WFL WFL     Shoulder Extension 0-80 WFL WFL     Shoulder Horizontal adduction 0-90 WFL WFL     Elbow flex/ext 0-150 WFL WFL     Wrist flex 0-80  WFL WFL     Wrist ext 0-70 WFL WFL     Supination 0-80 WFL WFL     Pronation 0-80 WFL WFL     UD WFL WFL     RD WFL WFL       Fist: WFL, both weak        Strength 7/6/22 7/6/22 10/26/22 10/26/22   **within available ROM** Left Right Left Right   Shoulder flex 4/5 4/5 3 3   Shoulder abd 4/5 4/5 3 3   Shoulder ER 4/5 4/5 3 3   Shoulder IR 4/5 4/5 3 3   Shoulder Extension 4/5 4/5 4 4   Shoulder Horizontal adduction 4/5 4/5 4 4   Elbow flex 4/5 4/5 4 4   Elbow ext 4/5 4/5 4 4   Wrist flex 4/5 4/5 4 4   Wrist ext 4/5 4/5 4 4   Supination 4/5 4/5 4 4   Pronation 4/5 4/5 4 4       Sitting balance static: good  Sitting balance dynamic: good  Standing balance static: fair  Standing balance dynamic:  fair    Wheelchair mobility: D    ADL's:  Feeding: Min A  Grooming: D  Hygiene: Mod I  UB Dressing: Min A  LB Dressing: Min A  Toileting: I  Bathing: D    IADL's:  Homecare: no longer doing  Cooking: D  Laundry: D  Yard work: n/a  Telephone/technology use:  I  Money management: I  Medication management: I     Disabilities of the Arm, Shoulder and Hand (DASH) questionnaire: 53% on last visit.  42.5%    Treatment    No Treatment Provided today.     Education Provided     Education provided re: POC; Purpose of OT services now and in the future to assist with maximizing independence, adaptation/modifications, mobility training, and the ALS disease process.  Sophia verbalized good understanding of education provided. Pt has no cultural, educational or language barriers to learning provided.      Written Home Exercises Provided: none provided today   Energy conservation and work simplification education sent     ASSESSMENT    Sophia is a 67 y.o. female referred to outpatient Occupational Therapy with a medical diagnosis of UMND. Pt presents with increased fatigue and strength/endurance limiations slowing ADL/IADL completion.       Equipment recommendations:    Custom C     Referral recommendations: Home eval/fall prevention equipment  mods   none        Pt prognosis is Fair.     Plan of care discussed with patient: Yes  Pt's spiritual, cultural and educational needs considered and patient is agreeable to the plan of care.    Anticipated Barriers for therapy: Progressive nature of ALS     Plan   Plan of care Certification: 10/26/2022 eval Only    Pt to continue to follow up with referring provider and ALS clinic at physician recommended intervals.       JAZ Quiroga

## 2022-10-26 NOTE — PATIENT INSTRUCTIONS
"ALS Clinic After Visit Notes:  Please reach out to the clinic coordinator if you do not receive equipment / follow up calls for services that were ordered today within 1 week.     Speech Therapy:  1. Follow up with [facility] in 3 months   2. Aspiration Precautions  3. Excellent and frequent oral care    Aspiration Precautions:    Tips for safe eating and drinking:   Clean your mouth before and after meals.  Make sure your mouth is clean and moist before starting your meal. When you are finished, clean your mouth again. Make sure there is no food around the teeth or stuck in your cheeks.   Be alert. Eat, drink, and take your medications only when you are alert and paying attention.   Reduce distractions. Turn off the TV and reduce distractions while you are eating and drinking.  Sit upright. Sit fully upright for your meals and to take your medications. It's best to eat and drink while sitting in a chair, unless your healthcare provider gave you other positioning instructions.   Stay upright. Stay fully upright for at least 30 minutes after a meal. You may also need to avoid eating 1-2 hours before bed.     Copyright [employer] Education     Pulmonary: we are ordering the breathing machine (ventilator) to use at night. When they deliver it please ask for more training in the use of the "Cough Assist" machine. Also, please see whether you can lie nearly flat for 20-30 minutes - to assess for safety of the feeding tube procedure.    OT: See below    Physical therapy:  - completed power wheelchair evaluation assessment today. Chris Mtz from [facility] should follow-up for home visit to gather measurements and discuss specifics prior to ordering.   - We have placed an order for a yolanda lift and sling to assist with transfer at home  - Please have home health occupational therapist provide training in proper/safe use of Yolanda lift  - See below for elevation of legs to decrease swelling and ankle " "stretching exercise                                                                                 Saving Energy: Motion Economy     AVOID RUSHING   Organize activities and try to do them at the same way at all times. Repetition  makes you proficient and will save time and energy.    Take frequents rests. This will prevent over fatigue and leave you ready to go on  with other activities. Do diaphragmatic breathing when resting.   Spread heavy and light tasks throughout the day. Consider when the best time is  for each activity. When do you have the most energy and move your best?   Set priorities and eliminate unnecessary tasks.   Analyze each task to be done, develop an easier method- "work simplification".    AVOID UNNECESSARY MOTIONS   Sit instead of stand for any lengthy task (greater than 5-10 minutes).   Use both hands in a smooth flowing motion, in opposite and symmetrical  Direction.   Avoid holding or lifting. Use a wheeled cart or slide things.   Avoid over reach and bending by arranging work area within normal reach.   Avoid lifting, pushing, or carrying heavy things.   Avoid running up or down stairs.   Arrange work space for specific tasks with supplies and equipment arranged well.   Live simply, avoid unnecessary cluttering of items.    Use Labor saving equipment.   Good posture prevents fatigue.    USE GOOD BODY MECHANICS   Use stronger muscles to push, pull, lifting, and climbing.    PROPER WORKING CONDITIONS   Correct counter height for standing- 2 inch below bent elbow.   Correct chair height. 2 inch below elbow.    Work within range of reach putting frequent used items for easier reach.    Obtain items needed prior to starting task.    Good air flow.   Good lighting.   Work in a Relaxed manner- use music, simplify task, use diaphragmatic breathing, walk slowly.   Exhale when you do strenuous part of activity.    Mental hygiene- control worrying, use your energy to change things you can and accept " those things you can't change.   Encourage family teamwork to help.           Energy Conservation  TAKE TIME to plan a good work program.   Pace yourself daily and build activity level slowly.   Space activities over a period of time; use a calendar to plan ahead.   Plan a balance of work, recreation, exercise and rest.     CANCEL all tasks that are not necessary.   Let dishes air dry; eliminate ironing if possible.   Put away furnishing that need extra work. For example, knick knacks that  need dusting.    DELEGATE responsibility to others   Allow family and friends to help with tasks that are difficult or tiring.    ARRANGE equipment and supplies to save time and movement.    Organize work areas to make tools or appliances easy to reach and grasp.    SIT to work whenever possible.   Keep a stool or chair at different work areas; specifically kitchen and  Bathroom. Sit to fold clothes or to do food prep are examples of when to sit.    AVOID holding and carrying objects.   Use a cart on wheels to move items like garbage cans or laundry. Use rolling    Mop bucket.    Slide whenever possible like pots on countertop.    ADJUST the heights of work surfaces for good posture.   Work surface should be 2 inches below elbows. Have materials located near  you.     INCLUDE rest periods in your daily schedule.   Avoid fatigue, rest before you tire.   Hurry and worry will increase fatigue.    SIMPLIFY your methods of work until they are habit.   Use prepared foods, one step or one meal dishes.   Serve from pots on stove instead of transferring to serving dishes.   Do not begin activities that cannot be stopped if it is too fatiguing.   Use energy saving equipment (, electric can opener, etc.).        Grooming: Brushing Teeth        Use electric toothbrush to brush teeth. Sit to brush. Support elbow on table or counter.  Copyright © I. All rights reserved.     Grooming: Drying Hair        Support elbows to dry hair. This  is a good technique for other grooming tasks. Move head to save arm movements.  Copyright © I. All rights reserved.     Grooming: Shaving        Sit in chair to use electric shaver.  Support arm using other hand, or rest elbow on vanity.  Copyright © I. All rights reserved.     Bathing: Drying Off        Put on ganesh ayala immediately after stepping out of shower or bath.  Sit down and pat to dry off.  Copyright © I. All rights reserved.     Food Preparation: Pots / Dishware        Do not lift pots to serve food. Instead, tip pots and ladle food onto plates. Avoid heavy pans and dishes as much as possible.  Copyright © I. All rights reserved.     Home Management: Pots / Pans        Leave frequently used pots on stove or counter instead of putting away and taking out each time.  Copyright © I. All rights reserved.     Kitchen Clean Up        Place towel on work surface and washable non-skid mat on floor to minimize need to wipe counter or mop floor.  Copyright © I. All rights reserved.     Home / Work Management: Laundry - Loading Wash        When preparing clothes for washing, keep items at waist height. Choose a top-loading rather than front-loading washing machine, if possible.  Copyright © I. All rights reserved.     Housework: Sweeping        When sweeping, avoid bending over. Instead use a long-handled broom and dustpan.  Copyright © I. All rights reserved.     Making Bed        Try sitting when tucking sheets.  Place bed in area where you can reach all corners without straining.  Use lightweight bedding. Down comforter or cotton coverlet can serve as cover.  Copyright © I. All rights reserved.     Gardening        Use a tool belt or vest to carry tools. This saves extra trips to the tool shed. Alternate heavy work such as pruning, with lighter tasks. Sit whenever possible. Rest often.  Copyright © I. All rights reserved.

## 2022-10-26 NOTE — PROGRESS NOTES
"MNT follow-up:    Pt present for today's encounter with her son (with whom she is now living) and her niece.    Weight history:    Today,10/26/2022:  unable to stand on scale in clinic                7/06/2022:  82.1 kg (181 lbs) - standing scale               4/13/2022:  83.2 kg (183.4 lbs) - standing scale    Pt will be scheduled for Gastrostomy tube placement ("indicated for dysphagia and progressive weakness") per Dr Salamanca.  Pt used phone to type questions/answers with me today.  She asked if she would still get thirsty after G-tube placed; advised her she will still be able to eat and drink whatever she tolerates.   Pt's niece states she's familiar with tube feeding regimen.  Will continue to follow to monitor nutritional status per ALS clinic protocol.     MNT Feeding Tube Recommendations:    Pt will obtain adequate calories/protein via commercial standardized formula: Isosource 1.5  Ht:  66"  IBW:  59 kg  Estimated calorie needs:  1770 kcal/day (30 kcal/kg AIBW)  Estimated protein needs:59 gm (1.0 gm/kg AIBW)  Estimated fluid needs: 1770 ml/day (1 ml/kcal/day)    When medically able, initiate tube feeding via PEG with Isosource 1.5 (or equivalent) - bolus feeds as tolerated.  Goal is to provide 5-250 ml cartons daily + free water flushes before and after feeds.    -to establish tolerance, start with 3 cartons (1 per "meal") daily.  Advance to 5-250 ml cartons as tolerated 2 in am, 2 in afternoon, 1 in pm.  Flush tube with  ml water before and after each feed.    5-250 ml cartons Isosource 1.5 daily will provide 1875  kcal, 85 gm protein, 19.02 gm dietary fiber, 955 ml free water + additional water from flushes.    Goal:  Pt will receive adequate nutrition to meet assessed needs within 48-72 hours of initiating tube feeding.    The home health agency will provide education on site care, enteral nutrition administration, etc; the vendor (ie [facility]) will provide actual " enteral nutrition formula.

## 2022-10-26 NOTE — TELEPHONE ENCOUNTER
NIV order faxed to [facility]. Patient currently receives services from [facility] for CA and Sx device.

## 2022-10-26 NOTE — PROGRESS NOTES
Patient and Family Members Demographics:  Patient is a 66 year old female present for today's ALS clinic.    Patient accompanied by favorite niece Alessandra and son Jose M.   Patient resides in [place], [place] - daughter Keila passed away September 1    (CONTACT INFO)        Son: Jose M Hope [id number]  [email or web address]   Alessandra  [phone]    SUPPORTS:   Lives with Jose M     MICHELLE provided handout with following information on ALS family supports.        Community Choices Waiver (CCW)- information attached  There is a program under the [place] Long Term Medicaid Benefit called Community Choices Waiver (CCW). People with ALS will generally qualify medically, but you also have to qualify financially. They will use ( Medicaid ) criteria to be eligible for this program. This program has some benefits as listed on the handout and would allow for a person to sit with person with ALS. The sitter will be limited to what they are allowed to do and they usually dont handle medications, machines, or administer tube feeding. They would be able to assist with dressing, bathing, transfers, and other small tasks.     TEAM IVORY or LISA     they are not yet linked to team [place].    Patient and family spoke to ALSA representative at last clinic.      INSURANCE COVERAGE:   Patient confirmed the following insurance coverage:   Medicare A and B and BCBS - part of a state plan.   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      [place] benefits:   Patient confirmed they are not a .      Waiver Services:   [place]: MICHELLE provided handout out Community [employer] waiver for [place]   What is the Community [employer] Waiver Program?  The Community Choices Waiver program provides services in the home and in the community to elders or adults with disabilities who qualify.  [place] Options in Long Term Care at [phone]        MICHELLE TO FOLLOW UP:    to emailed medicaid Faina.      Benedict Casey  LCSW

## 2022-10-27 ENCOUNTER — SOCIAL WORK (OUTPATIENT)
Dept: NEUROLOGY | Facility: CLINIC | Age: 67
End: 2022-10-27
Payer: MEDICARE

## 2022-10-27 NOTE — PROGRESS NOTES
MICHELLE reached out to [facility] Medicaid contact and received following response.     Faina Hilario (LDH) <[email or web address]>  She is not currently on our registry. If there you make contact with the family, if you give us a preferred time they would like us to reach out to them, we can make contact and get them added.    MICHELLE placed call to Jose M , patient's son and asked what would be a good time.  He reports that later today form 2 - 4 pm would be best.     MICHELLE sent Faina an email and informed her of his request.

## 2022-11-03 DIAGNOSIS — Z71.89 COMPLEX CARE COORDINATION: ICD-10-CM

## 2022-11-06 PROBLEM — M17.0 PRIMARY OSTEOARTHRITIS OF BOTH KNEES: Status: ACTIVE | Noted: 2022-11-06

## 2022-11-06 PROBLEM — G89.29 CHRONIC MIDLINE LOW BACK PAIN WITHOUT SCIATICA: Status: ACTIVE | Noted: 2022-11-06

## 2022-11-06 PROBLEM — Z78.9 IMPAIRED MOBILITY AND ADLS: Status: ACTIVE | Noted: 2022-11-06

## 2022-11-06 PROBLEM — K11.7 SIALORRHEA: Chronic | Status: ACTIVE | Noted: 2022-11-06

## 2022-11-06 PROBLEM — Z74.09 IMPAIRED MOBILITY AND ADLS: Status: ACTIVE | Noted: 2022-11-06

## 2022-11-06 PROBLEM — M54.50 CHRONIC MIDLINE LOW BACK PAIN WITHOUT SCIATICA: Status: ACTIVE | Noted: 2022-11-06

## 2022-11-06 PROBLEM — Z63.4 RECENT BEREAVEMENT: Status: ACTIVE | Noted: 2022-11-06

## 2022-11-06 PROBLEM — J96.10 CHRONIC NEUROMUSCULAR RESPIRATORY FAILURE: Status: ACTIVE | Noted: 2022-11-06

## 2022-11-07 NOTE — ASSESSMENT & PLAN NOTE
She needs gastric tube placement secondary to dysphagia and worsening neuromuscular respiratory weakness / failure. She may have troubles tolerating the procedure.   - Referral placed to IR for gastrostomy placement   - CT Abd / Pelvis placed

## 2022-11-07 NOTE — ASSESSMENT & PLAN NOTE
Mrs. Jordan was seen today for mobility evaluation for a power mobility device due to significant impairment at home. She has gait impairment and is unable to use a walker consistently more than 20-30 feet due to BLE weakness and fatigue secondary to ALS. She also has impaired balance due to weakness related to ALS. She is not able to ambulate safely to the kitchen or living room and is unable to use an optimally-configured manual wheelchair in the home in order to perform Mobility Related Activities of Daily Living due to BUE weakness & fatigue. She also has dyspnea due to respiratory muscle fatigue. Her cognition is intact and she should be able to use a power mobility device well at home. She was given a prescription for a power wheelchair. A scooter would not be appropriate due the patient's difficulty controlling the scooter tiller due to hand weakness & fatigue and to maneuverability restrictions at home. A power wheelchair will allow the patient to go safely to the kitchen, dining room or living room for feeding & socialization.   - Yolanda lift with hygiene sling was ordered   - Home Health OT orders placed for Yolanda lift training   - Alert quintana recommended

## 2022-11-07 NOTE — ASSESSMENT & PLAN NOTE
- She will benefit from a shower WC to leave in the bathroom. ALSA to assist    - Energy conservation strategies were emphasized.

## 2022-11-07 NOTE — ASSESSMENT & PLAN NOTE
Dr. Salamanca is recommending NIV and cough assist device.   - She will need NIV and cough assist training

## 2022-11-07 NOTE — ASSESSMENT & PLAN NOTE
Significant progression of disease since her last visit. ALS-FRS decline form 36 to 26.    - Continue Riluzole 50 Q12   - Orders have been placed for Radicava ORS   - Labs ordered

## 2022-11-08 ENCOUNTER — CLINICAL SUPPORT (OUTPATIENT)
Dept: INTERVENTIONAL RADIOLOGY/VASCULAR | Facility: CLINIC | Age: 67
End: 2022-11-08
Payer: MEDICARE

## 2022-11-08 DIAGNOSIS — R13.12 OROPHARYNGEAL DYSPHAGIA: Primary | ICD-10-CM

## 2022-11-08 DIAGNOSIS — E46 PROTEIN-CALORIE MALNUTRITION, UNSPECIFIED SEVERITY: ICD-10-CM

## 2022-11-08 DIAGNOSIS — G12.21 ALS (AMYOTROPHIC LATERAL SCLEROSIS): ICD-10-CM

## 2022-11-08 PROCEDURE — 99203 PR OFFICE/OUTPT VISIT, NEW, LEVL III, 30-44 MIN: ICD-10-PCS | Mod: 95,,, | Performed by: FAMILY MEDICINE

## 2022-11-08 PROCEDURE — 99203 OFFICE O/P NEW LOW 30 MIN: CPT | Mod: 95,,, | Performed by: FAMILY MEDICINE

## 2022-11-08 NOTE — PROGRESS NOTES
Subjective:       Patient ID: Sophia Hope is a 67 y.o. female.    Chief Complaint: Dysphagia (ALS)    Virtual visit with patient referred to Interventional Radiology by Jose Drake MD to discuss feeding tube placement. Patient has a history of ALS. She has dysarthria, dysphagia, and worsening respiratory function. Her son is present for our visit.    Review of Systems   Constitutional:  Negative for activity change, appetite change, chills, fatigue and fever.   Respiratory:  Negative for cough, shortness of breath, wheezing and stridor.    Cardiovascular:  Negative for chest pain, palpitations and leg swelling.   Gastrointestinal:  Negative for abdominal distention, abdominal pain, constipation, diarrhea, nausea and vomiting.   Neurological:  Positive for weakness (especially in the legs).       Objective:      Physical Exam  Constitutional:       General: She is not in acute distress.     Appearance: She is well-developed. She is not diaphoretic.   HENT:      Head: Normocephalic and atraumatic.   Pulmonary:      Effort: Pulmonary effort is normal. No respiratory distress.   Neurological:      Mental Status: She is alert and oriented to person, place, and time.      Cranial Nerves: Dysarthria present.   Psychiatric:         Behavior: Behavior normal.         Thought Content: Thought content normal.         Judgment: Judgment normal.       Assessment:       Problem List Items Addressed This Visit          Neuro    ALS (amyotrophic lateral sclerosis)    Relevant Orders    CT Abdomen Without Contrast    CBC Auto Differential    Protime-INR       GI    Oropharyngeal dysphagia - Primary    Relevant Orders    CT Abdomen Without Contrast    CBC Auto Differential    Protime-INR     Other Visit Diagnoses       Protein-calorie malnutrition, unspecified severity        Relevant Orders    Protime-INR            Plan:        The patient location is: [place]  The chief complaint leading to consultation is:  dysphagia    Visit type: audiovisual    Face to Face time with patient: 20 minutes  25 minutes of total time spent on the encounter, which includes face to face time and non-face to face time preparing to see the patient (eg, review of tests), Obtaining and/or reviewing separately obtained history, Documenting clinical information in the electronic or other health record, Independently interpreting results (not separately reported) and communicating results to the patient/family/caregiver, or Care coordination (not separately reported).         Each patient to whom he or she provides medical services by telemedicine is:  (1) informed of the relationship between the physician and patient and the respective role of any other health care provider with respect to management of the patient; and (2) notified that he or she may decline to receive medical services by telemedicine and may withdraw from such care at any time.    Notes:   Discussed case with Dr. Forde. Discussed how the procedure will be performed, risks (including, but not limited to, pain, bleeding, infection, damage to nearby structures, and the need for additional procedures), benefits, possible complications, pre-post procedure expectations, and alternatives. The patient voices understanding and all questions have been answered.  The patient agrees to proceed as planned. Patient scheduled for 12/1/2022. Clinic phone number provided.

## 2022-11-08 NOTE — Clinical Note
"Thank you for referring Ms. Hope to Interventional Radiology at the [facility]. Please don't hesitate to contact us if there are any questions regarding this evaluation at [phone]. If you have any other patients for whom you would like a consultation, please place an order for "[id number]", and we will be happy to review their case.  Sincerely, ANNABELLE Zurita, FNP Interventional Radiology   "

## 2022-11-09 ENCOUNTER — TELEPHONE (OUTPATIENT)
Dept: FAMILY MEDICINE | Facility: CLINIC | Age: 67
End: 2022-11-09
Payer: MEDICARE

## 2022-11-09 DIAGNOSIS — I10 ESSENTIAL HYPERTENSION: Primary | ICD-10-CM

## 2022-11-09 DIAGNOSIS — M79.89 LEG SWELLING: ICD-10-CM

## 2022-11-09 RX ORDER — FUROSEMIDE 20 MG/1
20 TABLET ORAL DAILY
Qty: 30 TABLET | Refills: 5 | Status: SHIPPED | OUTPATIENT
Start: 2022-11-09 | End: 2023-11-09

## 2022-11-09 NOTE — TELEPHONE ENCOUNTER
----- Message from Bisi Kyle sent at 11/9/2022  2:22 PM CST -----  Contact: Siri/[facility] with [facility] is calling to speak with someone regarding mutual patient. Reports speaking with provider and call was disconnected. Please give Ms. Horner a call back at [phone] when possible.  Thank you,  GH

## 2022-11-09 NOTE — TELEPHONE ENCOUNTER
Called an spoke with pallative care nurse. States pt.' Legs are very swollen. No pitting edema yet but she is concerned. States she will try to bring pt. Heel protectors as back of heels have slighly cracked. She wanted to consult with you and see if you wanted to change any thing or had any suggestions. The family elevates her feet at night in the recliner.

## 2022-11-09 NOTE — TELEPHONE ENCOUNTER
Advise can change from hydrochlorothiazide to Lasix 20 mg daily.    I have put the following orders and/or medications to this note.  Please advise pt and assist.    No orders of the defined types were placed in this encounter.      Medications Ordered This Encounter   Medications    furosemide (LASIX) 20 MG tablet     Sig: Take 1 tablet (20 mg total) by mouth once daily.     Dispense:  30 tablet     Refill:  5

## 2022-11-10 ENCOUNTER — OFFICE VISIT (OUTPATIENT)
Dept: OPHTHALMOLOGY | Facility: CLINIC | Age: 67
End: 2022-11-10
Payer: MEDICARE

## 2022-11-10 DIAGNOSIS — H40.1231 LOW-TENSION GLAUCOMA OF BOTH EYES, MILD STAGE: Primary | ICD-10-CM

## 2022-11-10 DIAGNOSIS — Z96.1 PSEUDOPHAKIA OF BOTH EYES: ICD-10-CM

## 2022-11-10 PROCEDURE — 99999 PR PBB SHADOW E&M-EST. PATIENT-LVL III: CPT | Mod: PBBFAC,,, | Performed by: OPHTHALMOLOGY

## 2022-11-10 PROCEDURE — 92133 CPTRZD OPH DX IMG PST SGM ON: CPT | Mod: PBBFAC | Performed by: OPHTHALMOLOGY

## 2022-11-10 PROCEDURE — 92083 EXTENDED VISUAL FIELD XM: CPT | Mod: PBBFAC | Performed by: OPHTHALMOLOGY

## 2022-11-10 PROCEDURE — 92083 HUMPHREY VISUAL FIELD - OU - BOTH EYES: ICD-10-PCS | Mod: 26,S$PBB,, | Performed by: OPHTHALMOLOGY

## 2022-11-10 PROCEDURE — 99213 OFFICE O/P EST LOW 20 MIN: CPT | Mod: PBBFAC | Performed by: OPHTHALMOLOGY

## 2022-11-10 PROCEDURE — 99999 PR PBB SHADOW E&M-EST. PATIENT-LVL III: ICD-10-PCS | Mod: PBBFAC,,, | Performed by: OPHTHALMOLOGY

## 2022-11-10 PROCEDURE — 92014 PR EYE EXAM, EST PATIENT,COMPREHESV: ICD-10-PCS | Mod: S$PBB,,, | Performed by: OPHTHALMOLOGY

## 2022-11-10 PROCEDURE — 92014 COMPRE OPH EXAM EST PT 1/>: CPT | Mod: S$PBB,,, | Performed by: OPHTHALMOLOGY

## 2022-11-10 PROCEDURE — 92133 POSTERIOR SEGMENT OCT OPTIC NERVE(OCULAR COHERENCE TOMOGRAPHY) - OU - BOTH EYES: ICD-10-PCS | Mod: 26,S$PBB,, | Performed by: OPHTHALMOLOGY

## 2022-11-10 RX ORDER — BRIMONIDINE TARTRATE AND TIMOLOL MALEATE 2; 5 MG/ML; MG/ML
1 SOLUTION OPHTHALMIC EVERY MORNING
Qty: 5 ML | Refills: 12 | Status: SHIPPED | OUTPATIENT
Start: 2022-11-10 | End: 2022-12-10

## 2022-11-10 RX ORDER — BRIMONIDINE TARTRATE AND TIMOLOL MALEATE 2; 5 MG/ML; MG/ML
1 SOLUTION OPHTHALMIC 2 TIMES DAILY
Qty: 10 ML | Refills: 12 | Status: CANCELLED | OUTPATIENT
Start: 2022-11-10

## 2022-11-10 RX ORDER — LATANOPROST 50 UG/ML
1 SOLUTION/ DROPS OPHTHALMIC NIGHTLY
Qty: 2.5 ML | Refills: 12 | Status: SHIPPED | OUTPATIENT
Start: 2022-11-10

## 2022-11-10 NOTE — PROGRESS NOTES
SUBJECTIVE  Sophia JACK Hope is 67 y.o. female  Uncorrected distance visual acuity was 20/30 in the right eye and 20/40 in the left eye.   Chief Complaint   Patient presents with    Glaucoma     Pt here for 4m HVF GOCT chk. No pain or discomfort. VA stable. 100% compliant with gtts. Pt says she needs a refill of her Combigan.           HPI     Glaucoma     Additional comments: Pt here for 4m HVF GOCT chk. No pain or discomfort.   VA stable. 100% compliant with gtts. Pt says she needs a refill of her   Combigan.            Comments    1. Mild LTG No FHx (init 17/17=21/21 adjusted for thin K's)  Goal <14  CCT /  2. PCIOL I-Stent (inject) OS +20.5 SN60WF (distance)12/19/2018  PCIOL / I-Stent OD +20.5 SN60WF (distance) 7-25-18  3. Ptosis    AT's prn OU    Combigan QAM OD  Xalatan QHS OU            Last edited by Delano Preciado MA on 11/10/2022  1:30 PM.         Assessment /Plan :  1. Low-tension glaucoma of both eyes, mild stage Doing well, intraocular pressure (IOP) within acceptable range relative to target IOP and no evidence of progression. Continue current treatment. Reviewed importance of continued compliance with treatment and follow up.      Patient instructed to continue using the following glaucoma medication as follows:  Latanoprost one drop in each eye nightly and Combigan one drop in the right eye every morning    Return to clinic in 6 months  or as needed.  With IOP Check and GOCT     2. Pseudophakia of both eyes  -- Condition stable, no therapeutic change required. Monitoring routinely.

## 2022-11-17 ENCOUNTER — HOSPITAL ENCOUNTER (OUTPATIENT)
Dept: RADIOLOGY | Facility: HOSPITAL | Age: 67
Discharge: HOME | End: 2022-11-17
Attending: FAMILY MEDICINE
Payer: MEDICARE

## 2022-11-17 DIAGNOSIS — R13.12 OROPHARYNGEAL DYSPHAGIA: ICD-10-CM

## 2022-11-17 DIAGNOSIS — G12.21 ALS (AMYOTROPHIC LATERAL SCLEROSIS): ICD-10-CM

## 2022-11-17 PROCEDURE — 74150 CT ABDOMEN WITHOUT CONTRAST: ICD-10-PCS | Mod: 26,,, | Performed by: RADIOLOGY

## 2022-11-17 PROCEDURE — 74150 CT ABDOMEN W/O CONTRAST: CPT | Mod: TC

## 2022-11-17 PROCEDURE — 74150 CT ABDOMEN W/O CONTRAST: CPT | Mod: 26,,, | Performed by: RADIOLOGY

## 2022-11-25 ENCOUNTER — TELEPHONE (OUTPATIENT)
Dept: FAMILY MEDICINE | Facility: CLINIC | Age: 67
End: 2022-11-25

## 2022-11-25 DIAGNOSIS — G12.21 ALS (AMYOTROPHIC LATERAL SCLEROSIS): Primary | ICD-10-CM

## 2022-11-25 NOTE — TELEPHONE ENCOUNTER
Spoke with pallative care nurse. She states alex went In to hospital for increased fluid and has been up and down as far as retention. She wanted to know if it was possible to get there a hospital bed. She goes for peg tube placement in a couple of weeks.

## 2022-11-25 NOTE — TELEPHONE ENCOUNTER
----- Message from Dena Shepard sent at 11/25/2022 12:04 PM CST -----  Contact: Siri (NP)- [facility] would like a call back at [phone], in regards to pt recent hospital stay.

## 2022-11-28 ENCOUNTER — TELEPHONE (OUTPATIENT)
Dept: FAMILY MEDICINE | Facility: CLINIC | Age: 67
End: 2022-11-28
Payer: MEDICARE

## 2022-11-28 ENCOUNTER — PATIENT MESSAGE (OUTPATIENT)
Dept: FAMILY MEDICINE | Facility: CLINIC | Age: 67
End: 2022-11-28
Payer: MEDICARE

## 2022-11-28 NOTE — TELEPHONE ENCOUNTER
Good morning Dr Lance,  I have canceled my mammogram appointment   based on last couple of test results and current body conditions. I feel irregardless there will be no follow up due to condition of my body.  Thanks for Understanding   Sophia Hope     Per portal.

## 2022-11-28 NOTE — TELEPHONE ENCOUNTER
"I have put the following orders and/or medications to this note.  Please advise pt and assist.    Orders Placed This Encounter   Procedures    HOSPITAL BED FOR HOME USE     Order Specific Question:   Type:     Answer:   Semi-electric     Order Specific Question:   Length of need (1-99 months):     Answer:   99     Order Specific Question:   Height:     Answer:   5'6"     Order Specific Question:   Weight:     Answer:   181 lb     Order Specific Question:   Please check all that apply:     Answer:   Patient requires positioning of the body in ways not feasible in an ordinary bed due to a medical condition which is expected to last at least one month.            "

## 2022-11-29 ENCOUNTER — DOCUMENTATION ONLY (OUTPATIENT)
Dept: PREADMISSION TESTING | Facility: HOSPITAL | Age: 67
End: 2022-11-29
Payer: MEDICARE

## 2022-11-30 ENCOUNTER — PATIENT MESSAGE (OUTPATIENT)
Dept: NEUROLOGY | Facility: CLINIC | Age: 67
End: 2022-11-30
Payer: MEDICARE

## 2022-11-30 ENCOUNTER — PATIENT MESSAGE (OUTPATIENT)
Dept: INTERVENTIONAL RADIOLOGY/VASCULAR | Facility: HOSPITAL | Age: 67
End: 2022-11-30
Payer: MEDICARE

## 2022-12-01 ENCOUNTER — TELEPHONE (OUTPATIENT)
Dept: INTERVENTIONAL RADIOLOGY/VASCULAR | Facility: CLINIC | Age: 67
End: 2022-12-01
Payer: MEDICARE

## 2022-12-01 NOTE — TELEPHONE ENCOUNTER
Spoke to pts son,  Pt is rescheduled on 1/06/2023 for IR procedure per request.  Preop instructions given, pt verbally understood. Pt aware and confirmed, Thanks

## 2022-12-27 DIAGNOSIS — Z74.09 IMPAIRED MOBILITY AND ADLS: ICD-10-CM

## 2022-12-27 DIAGNOSIS — Z78.9 IMPAIRED MOBILITY AND ADLS: ICD-10-CM

## 2022-12-27 DIAGNOSIS — G12.21 ALS (AMYOTROPHIC LATERAL SCLEROSIS): Primary | ICD-10-CM

## 2022-12-30 ENCOUNTER — TELEPHONE (OUTPATIENT)
Dept: NEUROLOGY | Facility: CLINIC | Age: 67
End: 2022-12-30
Payer: MEDICARE

## 2022-12-30 NOTE — TELEPHONE ENCOUNTER
MICHELLE placed call to Jose M Hope,( Son)   [phone] on behalf of ALS team.  MICHELLE offered condolences and he appreciated it.  He notes the  was earlier today.  He is thankful for the ALS clinic team and SW encouraged him to reach out for any questions or support.

## 2023-09-25 DIAGNOSIS — E03.9 HYPOTHYROIDISM, UNSPECIFIED TYPE: ICD-10-CM

## 2023-09-25 RX ORDER — LEVOTHYROXINE SODIUM 88 UG/1
TABLET ORAL
Qty: 90 TABLET | Refills: 3 | OUTPATIENT
Start: 2023-09-25
